# Patient Record
Sex: MALE | Race: ASIAN | NOT HISPANIC OR LATINO | URBAN - METROPOLITAN AREA
[De-identification: names, ages, dates, MRNs, and addresses within clinical notes are randomized per-mention and may not be internally consistent; named-entity substitution may affect disease eponyms.]

---

## 2018-01-21 ENCOUNTER — EMERGENCY (EMERGENCY)
Facility: HOSPITAL | Age: 83
LOS: 1 days | Discharge: HOME | End: 2018-01-21
Attending: EMERGENCY MEDICINE | Admitting: EMERGENCY MEDICINE
Payer: MEDICARE

## 2018-01-21 VITALS
RESPIRATION RATE: 16 BRPM | HEART RATE: 60 BPM | OXYGEN SATURATION: 100 % | SYSTOLIC BLOOD PRESSURE: 101 MMHG | TEMPERATURE: 98 F | DIASTOLIC BLOOD PRESSURE: 88 MMHG

## 2018-01-21 VITALS
HEART RATE: 52 BPM | OXYGEN SATURATION: 100 % | SYSTOLIC BLOOD PRESSURE: 107 MMHG | RESPIRATION RATE: 18 BRPM | DIASTOLIC BLOOD PRESSURE: 51 MMHG

## 2018-01-21 LAB
ALBUMIN SERPL ELPH-MCNC: 3.6 G/DL — SIGNIFICANT CHANGE UP (ref 3.3–5)
ALP SERPL-CCNC: 88 U/L — SIGNIFICANT CHANGE UP (ref 40–120)
ALT FLD-CCNC: 25 U/L — SIGNIFICANT CHANGE UP (ref 4–41)
APPEARANCE UR: SIGNIFICANT CHANGE UP
AST SERPL-CCNC: 39 U/L — SIGNIFICANT CHANGE UP (ref 4–40)
BASE EXCESS BLDV CALC-SCNC: -3.6 MMOL/L — SIGNIFICANT CHANGE UP
BASE EXCESS BLDV CALC-SCNC: 0.4 MMOL/L — SIGNIFICANT CHANGE UP
BASOPHILS # BLD AUTO: 0.05 K/UL — SIGNIFICANT CHANGE UP (ref 0–0.2)
BASOPHILS NFR BLD AUTO: 0.7 % — SIGNIFICANT CHANGE UP (ref 0–2)
BILIRUB SERPL-MCNC: 0.7 MG/DL — SIGNIFICANT CHANGE UP (ref 0.2–1.2)
BILIRUB UR-MCNC: NEGATIVE — SIGNIFICANT CHANGE UP
BLOOD GAS VENOUS - CREATININE: 1.57 MG/DL — HIGH (ref 0.5–1.3)
BLOOD GAS VENOUS - CREATININE: 1.88 MG/DL — HIGH (ref 0.5–1.3)
BLOOD UR QL VISUAL: NEGATIVE — SIGNIFICANT CHANGE UP
BUN SERPL-MCNC: 23 MG/DL — SIGNIFICANT CHANGE UP (ref 7–23)
CALCIUM SERPL-MCNC: 9.1 MG/DL — SIGNIFICANT CHANGE UP (ref 8.4–10.5)
CHLORIDE BLDV-SCNC: 108 MMOL/L — SIGNIFICANT CHANGE UP (ref 96–108)
CHLORIDE BLDV-SCNC: 110 MMOL/L — HIGH (ref 96–108)
CHLORIDE SERPL-SCNC: 105 MMOL/L — SIGNIFICANT CHANGE UP (ref 98–107)
CO2 SERPL-SCNC: 22 MMOL/L — SIGNIFICANT CHANGE UP (ref 22–31)
COLOR SPEC: YELLOW — SIGNIFICANT CHANGE UP
CREAT SERPL-MCNC: 1.92 MG/DL — HIGH (ref 0.5–1.3)
EOSINOPHIL # BLD AUTO: 0.18 K/UL — SIGNIFICANT CHANGE UP (ref 0–0.5)
EOSINOPHIL NFR BLD AUTO: 2.3 % — SIGNIFICANT CHANGE UP (ref 0–6)
GAS PNL BLDV: 134 MMOL/L — LOW (ref 136–146)
GAS PNL BLDV: 141 MMOL/L — SIGNIFICANT CHANGE UP (ref 136–146)
GLUCOSE BLDV-MCNC: 184 — HIGH (ref 70–99)
GLUCOSE BLDV-MCNC: 95 — SIGNIFICANT CHANGE UP (ref 70–99)
GLUCOSE SERPL-MCNC: 187 MG/DL — HIGH (ref 70–99)
GLUCOSE UR-MCNC: NEGATIVE — SIGNIFICANT CHANGE UP
HCO3 BLDV-SCNC: 20 MMOL/L — SIGNIFICANT CHANGE UP (ref 20–27)
HCO3 BLDV-SCNC: 23 MMOL/L — SIGNIFICANT CHANGE UP (ref 20–27)
HCT VFR BLD CALC: 43.8 % — SIGNIFICANT CHANGE UP (ref 39–50)
HCT VFR BLDV CALC: 42.3 % — SIGNIFICANT CHANGE UP (ref 39–51)
HCT VFR BLDV CALC: 45 % — SIGNIFICANT CHANGE UP (ref 39–51)
HGB BLD-MCNC: 14.4 G/DL — SIGNIFICANT CHANGE UP (ref 13–17)
HGB BLDV-MCNC: 13.8 G/DL — SIGNIFICANT CHANGE UP (ref 13–17)
HGB BLDV-MCNC: 14.7 G/DL — SIGNIFICANT CHANGE UP (ref 13–17)
HYALINE CASTS # UR AUTO: SIGNIFICANT CHANGE UP (ref 0–?)
IMM GRANULOCYTES # BLD AUTO: 0.02 # — SIGNIFICANT CHANGE UP
IMM GRANULOCYTES NFR BLD AUTO: 0.3 % — SIGNIFICANT CHANGE UP (ref 0–1.5)
KETONES UR-MCNC: NEGATIVE — SIGNIFICANT CHANGE UP
LACTATE BLDV-MCNC: 1.9 MMOL/L — SIGNIFICANT CHANGE UP (ref 0.5–2)
LACTATE BLDV-MCNC: 2.3 MMOL/L — HIGH (ref 0.5–2)
LEUKOCYTE ESTERASE UR-ACNC: NEGATIVE — SIGNIFICANT CHANGE UP
LYMPHOCYTES # BLD AUTO: 3.97 K/UL — HIGH (ref 1–3.3)
LYMPHOCYTES # BLD AUTO: 51.6 % — HIGH (ref 13–44)
MAGNESIUM SERPL-MCNC: 2.1 MG/DL — SIGNIFICANT CHANGE UP (ref 1.6–2.6)
MCHC RBC-ENTMCNC: 28.6 PG — SIGNIFICANT CHANGE UP (ref 27–34)
MCHC RBC-ENTMCNC: 32.9 % — SIGNIFICANT CHANGE UP (ref 32–36)
MCV RBC AUTO: 87.1 FL — SIGNIFICANT CHANGE UP (ref 80–100)
MONOCYTES # BLD AUTO: 0.73 K/UL — SIGNIFICANT CHANGE UP (ref 0–0.9)
MONOCYTES NFR BLD AUTO: 9.5 % — SIGNIFICANT CHANGE UP (ref 2–14)
MUCOUS THREADS # UR AUTO: SIGNIFICANT CHANGE UP
NEUTROPHILS # BLD AUTO: 2.74 K/UL — SIGNIFICANT CHANGE UP (ref 1.8–7.4)
NEUTROPHILS NFR BLD AUTO: 35.6 % — LOW (ref 43–77)
NITRITE UR-MCNC: NEGATIVE — SIGNIFICANT CHANGE UP
NRBC # FLD: 0 — SIGNIFICANT CHANGE UP
PCO2 BLDV: 43 MMHG — SIGNIFICANT CHANGE UP (ref 41–51)
PCO2 BLDV: 47 MMHG — SIGNIFICANT CHANGE UP (ref 41–51)
PH BLDV: 7.32 PH — SIGNIFICANT CHANGE UP (ref 7.32–7.43)
PH BLDV: 7.35 PH — SIGNIFICANT CHANGE UP (ref 7.32–7.43)
PH UR: 6 — SIGNIFICANT CHANGE UP (ref 4.6–8)
PHOSPHATE SERPL-MCNC: 3.6 MG/DL — SIGNIFICANT CHANGE UP (ref 2.5–4.5)
PLATELET # BLD AUTO: 176 K/UL — SIGNIFICANT CHANGE UP (ref 150–400)
PMV BLD: 8.7 FL — SIGNIFICANT CHANGE UP (ref 7–13)
PO2 BLDV: 25 MMHG — LOW (ref 35–40)
PO2 BLDV: 33 MMHG — LOW (ref 35–40)
POTASSIUM BLDV-SCNC: 4.1 MMOL/L — SIGNIFICANT CHANGE UP (ref 3.4–4.5)
POTASSIUM BLDV-SCNC: 4.4 MMOL/L — SIGNIFICANT CHANGE UP (ref 3.4–4.5)
POTASSIUM SERPL-MCNC: 4.6 MMOL/L — SIGNIFICANT CHANGE UP (ref 3.5–5.3)
POTASSIUM SERPL-SCNC: 4.6 MMOL/L — SIGNIFICANT CHANGE UP (ref 3.5–5.3)
PROT SERPL-MCNC: 8 G/DL — SIGNIFICANT CHANGE UP (ref 6–8.3)
PROT UR-MCNC: 20 MG/DL — SIGNIFICANT CHANGE UP
RBC # BLD: 5.03 M/UL — SIGNIFICANT CHANGE UP (ref 4.2–5.8)
RBC # FLD: 14.1 % — SIGNIFICANT CHANGE UP (ref 10.3–14.5)
RBC CASTS # UR COMP ASSIST: SIGNIFICANT CHANGE UP (ref 0–?)
SAO2 % BLDV: 41.4 % — LOW (ref 60–85)
SAO2 % BLDV: 56.4 % — LOW (ref 60–85)
SODIUM SERPL-SCNC: 139 MMOL/L — SIGNIFICANT CHANGE UP (ref 135–145)
SP GR SPEC: 1.02 — SIGNIFICANT CHANGE UP (ref 1–1.04)
SQUAMOUS # UR AUTO: SIGNIFICANT CHANGE UP
UROBILINOGEN FLD QL: 1 MG/DL — SIGNIFICANT CHANGE UP
WBC # BLD: 7.69 K/UL — SIGNIFICANT CHANGE UP (ref 3.8–10.5)
WBC # FLD AUTO: 7.69 K/UL — SIGNIFICANT CHANGE UP (ref 3.8–10.5)
WBC UR QL: SIGNIFICANT CHANGE UP (ref 0–?)

## 2018-01-21 PROCEDURE — 99284 EMERGENCY DEPT VISIT MOD MDM: CPT | Mod: GC

## 2018-01-21 PROCEDURE — 71046 X-RAY EXAM CHEST 2 VIEWS: CPT | Mod: 26

## 2018-01-21 PROCEDURE — 74176 CT ABD & PELVIS W/O CONTRAST: CPT | Mod: 26

## 2018-01-21 RX ORDER — POLYETHYLENE GLYCOL 3350 17 G/17G
17 POWDER, FOR SOLUTION ORAL
Qty: 300 | Refills: 0 | OUTPATIENT
Start: 2018-01-21 | End: 2018-01-27

## 2018-01-21 RX ORDER — SODIUM CHLORIDE 9 MG/ML
1000 INJECTION INTRAMUSCULAR; INTRAVENOUS; SUBCUTANEOUS ONCE
Qty: 0 | Refills: 0 | Status: COMPLETED | OUTPATIENT
Start: 2018-01-21 | End: 2018-01-21

## 2018-01-21 RX ADMIN — SODIUM CHLORIDE 1000 MILLILITER(S): 9 INJECTION INTRAMUSCULAR; INTRAVENOUS; SUBCUTANEOUS at 14:32

## 2018-01-21 NOTE — ED ADULT NURSE REASSESSMENT NOTE - NS ED NURSE REASSESS COMMENT FT1
received pt from BLANCA Potter, c/o urinary retention. Rpt VBG sent as ordered. VSS. EKG done at bedside. Will continue to monitor.

## 2018-01-21 NOTE — ED ADULT NURSE NOTE - OBJECTIVE STATEMENT
Received patient to room 12 with c/o no urinary output for the past couple of days. Pt evaluated by MD. IVL placed to right Ac 20 gauge and labs drawn and sent. Urine and UC&S obtained from straight cath.  Approximately 125cc output. Pt has family at bedside. Awaiting results, further evaluation, ordered and disposition.. BLANCA Day

## 2018-01-21 NOTE — ED PROVIDER NOTE - PROGRESS NOTE DETAILS
Nithya Ku MD PGY4  patient reassessed and reporting improvement of symptoms. copy of results given and instructed to follow up with pmd/urology

## 2018-01-21 NOTE — ED PROVIDER NOTE - OBJECTIVE STATEMENT
87 yo M with alzheimers dementia presenting with urinary retention x 2-3 days. Went to [facility] and sent to ED> Pt also has history of DM, htn, CABG, CKD not on dialysis. Denies fevers, chills, cough, rhinorrhea, otorrhea, otalgia, nausea, vomiting, constipation, diarrhea, chest pain, shortness of breath. Nephrologist Dr Rodas

## 2018-01-21 NOTE — ED PROVIDER NOTE - PMH
DM (diabetes mellitus)    Gastritis  + h pylori  GERD (gastroesophageal reflux disease)    Hep C w/o coma, chronic    History of subdural hematoma (post traumatic)    HTN (hypertension)    Tubular adenoma of colon

## 2018-01-21 NOTE — ED PROVIDER NOTE - MEDICAL DECISION MAKING DETAILS
87 yo M with urinary retention - bedside US shows <150cc urine - will straight cath to check urine, labs, assess renal function

## 2018-01-21 NOTE — ED PROVIDER NOTE - ATTENDING CONTRIBUTION TO CARE
I performed a face-to-face evaluation of the patient and performed a history and physical examination. I agree with the history and physical examination.    Older man, dementia, CKD (not on dialysis), p/w no urine x 2 days. Eating and drinking well. Feels need to urinate, but nothing comes. Appears well. NAD. Afebrile. Vital signs unremarkable. EDBUS no urine retention. Concern for renal failure. Plan: labs.

## 2018-01-22 LAB
BACTERIA UR CULT: SIGNIFICANT CHANGE UP
SPECIMEN SOURCE: SIGNIFICANT CHANGE UP

## 2018-04-09 PROCEDURE — 85060 BLOOD SMEAR INTERPRETATION: CPT

## 2018-05-30 ENCOUNTER — INPATIENT (INPATIENT)
Facility: HOSPITAL | Age: 83
LOS: 13 days | Discharge: HOME HEALTH | End: 2018-06-13
Attending: INTERNAL MEDICINE | Admitting: INTERNAL MEDICINE
Payer: MEDICARE

## 2018-05-30 VITALS
RESPIRATION RATE: 16 BRPM | HEART RATE: 62 BPM | DIASTOLIC BLOOD PRESSURE: 68 MMHG | TEMPERATURE: 98 F | SYSTOLIC BLOOD PRESSURE: 111 MMHG | OXYGEN SATURATION: 99 %

## 2018-05-30 DIAGNOSIS — G30.9 ALZHEIMER'S DISEASE, UNSPECIFIED: ICD-10-CM

## 2018-05-30 DIAGNOSIS — Z95.1 PRESENCE OF AORTOCORONARY BYPASS GRAFT: Chronic | ICD-10-CM

## 2018-05-30 DIAGNOSIS — N17.9 ACUTE KIDNEY FAILURE, UNSPECIFIED: ICD-10-CM

## 2018-05-30 DIAGNOSIS — I10 ESSENTIAL (PRIMARY) HYPERTENSION: ICD-10-CM

## 2018-05-30 DIAGNOSIS — E11.22 TYPE 2 DIABETES MELLITUS WITH DIABETIC CHRONIC KIDNEY DISEASE: ICD-10-CM

## 2018-05-30 DIAGNOSIS — R26.0 ATAXIC GAIT: ICD-10-CM

## 2018-05-30 DIAGNOSIS — R26.81 UNSTEADINESS ON FEET: ICD-10-CM

## 2018-05-30 DIAGNOSIS — I25.10 ATHEROSCLEROTIC HEART DISEASE OF NATIVE CORONARY ARTERY WITHOUT ANGINA PECTORIS: ICD-10-CM

## 2018-05-30 LAB
ALBUMIN SERPL ELPH-MCNC: 3.5 G/DL — SIGNIFICANT CHANGE UP (ref 3.3–5)
ALP SERPL-CCNC: 110 U/L — SIGNIFICANT CHANGE UP (ref 40–120)
ALT FLD-CCNC: 20 U/L — SIGNIFICANT CHANGE UP (ref 4–41)
AST SERPL-CCNC: 48 U/L — HIGH (ref 4–40)
BASOPHILS # BLD AUTO: 0.05 K/UL — SIGNIFICANT CHANGE UP (ref 0–0.2)
BASOPHILS NFR BLD AUTO: 0.5 % — SIGNIFICANT CHANGE UP (ref 0–2)
BILIRUB SERPL-MCNC: 0.8 MG/DL — SIGNIFICANT CHANGE UP (ref 0.2–1.2)
BUN SERPL-MCNC: 28 MG/DL — HIGH (ref 7–23)
CALCIUM SERPL-MCNC: 10.3 MG/DL — SIGNIFICANT CHANGE UP (ref 8.4–10.5)
CHLORIDE SERPL-SCNC: 99 MMOL/L — SIGNIFICANT CHANGE UP (ref 98–107)
CO2 SERPL-SCNC: 24 MMOL/L — SIGNIFICANT CHANGE UP (ref 22–31)
CREAT SERPL-MCNC: 2.29 MG/DL — HIGH (ref 0.5–1.3)
EOSINOPHIL # BLD AUTO: 0.3 K/UL — SIGNIFICANT CHANGE UP (ref 0–0.5)
EOSINOPHIL NFR BLD AUTO: 3.2 % — SIGNIFICANT CHANGE UP (ref 0–6)
GLUCOSE SERPL-MCNC: 127 MG/DL — HIGH (ref 70–99)
HCT VFR BLD CALC: 47.5 % — SIGNIFICANT CHANGE UP (ref 39–50)
HGB BLD-MCNC: 15.1 G/DL — SIGNIFICANT CHANGE UP (ref 13–17)
IMM GRANULOCYTES # BLD AUTO: 0.03 # — SIGNIFICANT CHANGE UP
IMM GRANULOCYTES NFR BLD AUTO: 0.3 % — SIGNIFICANT CHANGE UP (ref 0–1.5)
LYMPHOCYTES # BLD AUTO: 5.59 K/UL — HIGH (ref 1–3.3)
LYMPHOCYTES # BLD AUTO: 59.9 % — HIGH (ref 13–44)
MCHC RBC-ENTMCNC: 27.9 PG — SIGNIFICANT CHANGE UP (ref 27–34)
MCHC RBC-ENTMCNC: 31.8 % — LOW (ref 32–36)
MCV RBC AUTO: 87.6 FL — SIGNIFICANT CHANGE UP (ref 80–100)
MONOCYTES # BLD AUTO: 1 K/UL — HIGH (ref 0–0.9)
MONOCYTES NFR BLD AUTO: 10.7 % — SIGNIFICANT CHANGE UP (ref 2–14)
NEUTROPHILS # BLD AUTO: 2.36 K/UL — SIGNIFICANT CHANGE UP (ref 1.8–7.4)
NEUTROPHILS NFR BLD AUTO: 25.4 % — LOW (ref 43–77)
NRBC # FLD: 0.04 — SIGNIFICANT CHANGE UP
PLATELET # BLD AUTO: 127 K/UL — LOW (ref 150–400)
PMV BLD: 8.6 FL — SIGNIFICANT CHANGE UP (ref 7–13)
POTASSIUM SERPL-MCNC: 5.2 MMOL/L — SIGNIFICANT CHANGE UP (ref 3.5–5.3)
POTASSIUM SERPL-SCNC: 5.2 MMOL/L — SIGNIFICANT CHANGE UP (ref 3.5–5.3)
PROT SERPL-MCNC: 8.8 G/DL — HIGH (ref 6–8.3)
RBC # BLD: 5.42 M/UL — SIGNIFICANT CHANGE UP (ref 4.2–5.8)
RBC # FLD: 15.5 % — HIGH (ref 10.3–14.5)
SODIUM SERPL-SCNC: 136 MMOL/L — SIGNIFICANT CHANGE UP (ref 135–145)
WBC # BLD: 9.33 K/UL — SIGNIFICANT CHANGE UP (ref 3.8–10.5)
WBC # FLD AUTO: 9.33 K/UL — SIGNIFICANT CHANGE UP (ref 3.8–10.5)

## 2018-05-30 PROCEDURE — 70450 CT HEAD/BRAIN W/O DYE: CPT | Mod: 26

## 2018-05-30 PROCEDURE — 72110 X-RAY EXAM L-2 SPINE 4/>VWS: CPT | Mod: 26

## 2018-05-30 PROCEDURE — 72070 X-RAY EXAM THORAC SPINE 2VWS: CPT | Mod: 26

## 2018-05-30 RX ORDER — ASPIRIN/CALCIUM CARB/MAGNESIUM 324 MG
81 TABLET ORAL DAILY
Qty: 0 | Refills: 0 | Status: DISCONTINUED | OUTPATIENT
Start: 2018-05-30 | End: 2018-06-04

## 2018-05-30 RX ORDER — SODIUM CHLORIDE 9 MG/ML
500 INJECTION INTRAMUSCULAR; INTRAVENOUS; SUBCUTANEOUS ONCE
Qty: 0 | Refills: 0 | Status: COMPLETED | OUTPATIENT
Start: 2018-05-30 | End: 2018-05-30

## 2018-05-30 RX ORDER — METOPROLOL TARTRATE 50 MG
100 TABLET ORAL DAILY
Qty: 0 | Refills: 0 | Status: DISCONTINUED | OUTPATIENT
Start: 2018-05-30 | End: 2018-06-12

## 2018-05-30 RX ORDER — ACETAMINOPHEN 500 MG
650 TABLET ORAL EVERY 6 HOURS
Qty: 0 | Refills: 0 | Status: DISCONTINUED | OUTPATIENT
Start: 2018-05-30 | End: 2018-06-13

## 2018-05-30 RX ORDER — FOLIC ACID 0.8 MG
1 TABLET ORAL DAILY
Qty: 0 | Refills: 0 | Status: DISCONTINUED | OUTPATIENT
Start: 2018-05-30 | End: 2018-06-13

## 2018-05-30 RX ORDER — SODIUM CHLORIDE 9 MG/ML
1000 INJECTION INTRAMUSCULAR; INTRAVENOUS; SUBCUTANEOUS
Qty: 0 | Refills: 0 | Status: DISCONTINUED | OUTPATIENT
Start: 2018-05-30 | End: 2018-06-03

## 2018-05-30 RX ORDER — DONEPEZIL HYDROCHLORIDE 10 MG/1
5 TABLET, FILM COATED ORAL AT BEDTIME
Qty: 0 | Refills: 0 | Status: DISCONTINUED | OUTPATIENT
Start: 2018-05-30 | End: 2018-06-13

## 2018-05-30 RX ORDER — PREGABALIN 225 MG/1
1000 CAPSULE ORAL DAILY
Qty: 0 | Refills: 0 | Status: DISCONTINUED | OUTPATIENT
Start: 2018-05-30 | End: 2018-06-13

## 2018-05-30 RX ORDER — ATORVASTATIN CALCIUM 80 MG/1
80 TABLET, FILM COATED ORAL AT BEDTIME
Qty: 0 | Refills: 0 | Status: DISCONTINUED | OUTPATIENT
Start: 2018-05-30 | End: 2018-06-13

## 2018-05-30 RX ADMIN — SODIUM CHLORIDE 75 MILLILITER(S): 9 INJECTION INTRAMUSCULAR; INTRAVENOUS; SUBCUTANEOUS at 22:41

## 2018-05-30 RX ADMIN — ATORVASTATIN CALCIUM 80 MILLIGRAM(S): 80 TABLET, FILM COATED ORAL at 22:02

## 2018-05-30 RX ADMIN — DONEPEZIL HYDROCHLORIDE 5 MILLIGRAM(S): 10 TABLET, FILM COATED ORAL at 22:02

## 2018-05-30 RX ADMIN — SODIUM CHLORIDE 500 MILLILITER(S): 9 INJECTION INTRAMUSCULAR; INTRAVENOUS; SUBCUTANEOUS at 20:33

## 2018-05-30 NOTE — H&P ADULT - PROBLEM SELECTOR PLAN 5
- Continue donepezil  - Fall precautions - continue ASA and atorvastatin  - No ischemic changes on EKG

## 2018-05-30 NOTE — H&P ADULT - HISTORY OF PRESENT ILLNESS
87 yo M h/o HTN, DM, CKDIII, CAD s/p CABG, SDH, dementia brought by family for back pain and unsteady gait. Unable to obtain history from pt due to dementia. Per son at bedside, pt fell in the bathroom three weeks ago. Fall unwitnessed but heard by family who responded immediately and found pt on the floor. There was no LOC, unclear if any head trauma.  Since that fall pt has been complaining of back pain and has had rapid decline in ability to ambulate. Prior to fall pt ambulated independently using cane but now needs assistance from family. Also unable to tolerate sitting and prefers to be laying in bed. Family has also noted that he appears weak, appetite has been poor and he has to be forced to eat. He has had about 30lb weight loss in the past 3 months. Family has not noted significant cognitive decline. At baseline pt usually AAOx1 sometimes recognizes place and recognizes family members, able to hold a conversation and make his needs known. However, he needs assistance with all ADLs, including eating and bathing.     In ED pt given 500cc bolus  VS:  111/68  62  97.7  16  99% on RA

## 2018-05-30 NOTE — H&P ADULT - PROBLEM SELECTOR PLAN 2
- Monitor BP  - Continue metoprolol with hold parameters - Likely prerenal in setting of poor PO intake  - Start IVFs. Monitor Cr

## 2018-05-30 NOTE — H&P ADULT - PROBLEM SELECTOR PLAN 1
- s/p fall 3 weeks ago with persistent back pain. X ray showing likely T7 compression fracture. Per family, rapid decline in ambulation and independence   - Will obtain MRI T and L spine. No contrast given renal failure  - PT consult   - Fall precautions  - Tylenol for back pain prn

## 2018-05-30 NOTE — ED ADULT NURSE NOTE - ED STAT RN HANDOFF DETAILS
Patient is A&Ox2 (baseline mental status), Family at bedside, and in NAD, and has room available.  Report given to nurse on floor via phone.  Patient awaiting transportation.  Will continue to monitor patient closely. MARILYN Cordero R.N.

## 2018-05-30 NOTE — ED ADULT NURSE NOTE - OBJECTIVE STATEMENT
Patient as per family had unwitnessed fall approx 1 month ago while in the bath tub. Patient fell while in bathtub, did not fall from dizziness, but slipped. Patient hit head and did not lose LOC, not on blood thinners. As per family, patient is at baseline mental status. Patient has equal strength on both extremities, has difficulty walking with cane. Patient attempted to ambulate with assistance, assessed unsteady gait. Patient denies any dizziness or near syncope. Patient denies SOB on exertion or chest pain. Patient has no urinary or bowel issues.

## 2018-05-30 NOTE — H&P ADULT - PROBLEM SELECTOR PROBLEM 4
Coronary artery disease involving native heart without angina pectoris, unspecified vessel or lesion type Type 2 diabetes mellitus with stage 3 chronic kidney disease, without long-term current use of insulin

## 2018-05-30 NOTE — ED ADULT NURSE NOTE - CHPI ED SYMPTOMS NEG
no abrasion/no confusion/no deformity/no tingling/no weakness/no fever/no loss of consciousness/no numbness/no vomiting/no bleeding

## 2018-05-30 NOTE — H&P ADULT - PROBLEM SELECTOR PROBLEM 5
Alzheimer's dementia without behavioral disturbance, unspecified timing of dementia onset Coronary artery disease involving native heart without angina pectoris, unspecified vessel or lesion type

## 2018-05-30 NOTE — H&P ADULT - PROBLEM SELECTOR PLAN 3
- Hold oral meds   - Start sliding scale insulin   - Check HbA1C - Monitor BP  - Continue metoprolol with hold parameters

## 2018-05-30 NOTE — ED PROVIDER NOTE - ATTENDING CONTRIBUTION TO CARE
Case of an  87 y/o male patient with past medical history of CABG, dementia, HTN presenting to the ED due to a  fall last month, back pain and difficulty walking x 1 month. In the fall patient hit his head, he had x-rays that showed questionable fractures, but no head CT. Upon exam patient AAOX1, does denies pain, able to walk but with marked difficulty not due to pain, ataxic. R/O head bleed, R/O FXs, will check labs, head CT, x-rays and monitor closely. Agree with PA's physical exam, assessment and documentation

## 2018-05-30 NOTE — H&P ADULT - PROBLEM SELECTOR PROBLEM 3
Type 2 diabetes mellitus with stage 3 chronic kidney disease, without long-term current use of insulin Essential hypertension

## 2018-05-30 NOTE — H&P ADULT - ASSESSMENT
87 yo M h/o HTN, DM, CKDIII, CAD s/p CABG, SDH, dementia brought by family for back pain and unsteady gait after fall at home.

## 2018-05-30 NOTE — H&P ADULT - NSHPPHYSICALEXAM_GEN_ALL_CORE
T(C): 36.7 (05-30-18 @ 20:33), Max: 36.7 (05-30-18 @ 20:33)  HR: 57 (05-30-18 @ 20:33) (57 - 62)  BP: 122/51 (05-30-18 @ 20:33) (111/68 - 122/51)  RR: 18 (05-30-18 @ 20:33) (16 - 18)  SpO2: 100% (05-30-18 @ 20:33) (99% - 100%)    GENERAL: No acute distress, thin  HEAD:  Atraumatic, temporal wasting  ENT: EOMI, PERRLA, conjunctiva and sclera clear, Neck supple, No JVD, moist mucosa  CHEST/LUNG: Clear to auscultation bilaterally; No wheeze, equal breath sounds bilaterally   HEART: Regular rate and rhythm; No murmurs, rubs, or gallops  ABDOMEN: Soft, Nontender, Nondistended; Bowel sounds present, no organomegaly  EXTREMITIES:  2+ Peripheral Pulses, No clubbing, cyanosis, or edema  PSYCH: AAOx1 to person, flat affect  NEUROLOGY: non-focal, moving all extremities, sensation intact   SKIN: Normal color, No rashes or lesions  MUSCULOSKELETAL: No spinal tenderness

## 2018-05-30 NOTE — ED PROVIDER NOTE - PROGRESS NOTE DETAILS
ETHAN Pena called. He requests admission to Dr. Leyva's service. Gordon called. LIANNA left on answering machine.

## 2018-05-30 NOTE — H&P ADULT - PROBLEM SELECTOR PLAN 4
- continue ASA and atorvastatin  - No ischemic changes on EKG - Hold oral meds   - Start sliding scale insulin   - Check HbA1C

## 2018-05-30 NOTE — ED PROVIDER NOTE - MEDICAL DECISION MAKING DETAILS
Pt is an 87 y/o M nonsmoker PMHx CABG, dementia, HTN p/w fall last month, back pain and difficulty walking x 1 month -- r/o ICH, unsteady gait; no e/o spinal cord compression/cauda equina -- labs, CT head, Xray thoracolumbarsacral

## 2018-05-30 NOTE — ED ADULT TRIAGE NOTE - CHIEF COMPLAINT QUOTE
Pt had mechanical fall x3 weeks ago.  Daughter states pt is now c/o lower back pain, pointing and rubbing lower back.  PMHx Alzheimer's, chronic kidney disease.  Pt appears comfortable.

## 2018-05-30 NOTE — H&P ADULT - NSHPLABSRESULTS_GEN_ALL_CORE
.  LABS:                         15.1   9.33  )-----------( 127      ( 30 May 2018 19:04 )             47.5     05-30    136  |  99  |  28<H>  ----------------------------<  127<H>  5.2   |  24  |  2.29<H>    Ca    10.3      30 May 2018 19:04    TPro  8.8<H>  /  Alb  3.5  /  TBili  0.8  /  DBili  x   /  AST  48<H>  /  ALT  20  /  AlkPhos  110  05-30          RADIOLOGY, EKG & ADDITIONAL TESTS: Reviewed.

## 2018-05-30 NOTE — ED PROVIDER NOTE - OBJECTIVE STATEMENT
Pt is an 89 y/o M nonsmoker PMHx CABG, dementia, HTN p/w fall last month, back pain and difficulty walking x 1 month.  History largely obtained from family due to pt's known h/o dementia.  Pt had an unwitnessed fall 4/23/18 in shower.  Pt's family immediately rushed to bathroom and pt found awake on floor of bathtub.  At that time, pt was denying LOC.  Pt has since complained of lower back pain intermittently, which worsens with movement, standing, walking.  Pt saw PMD Dr. Alfaro a few days after fall who ordered Xrays, showing possible fracture of T10 and L4 for which MRI was ordered.  MRI not yet performed due to "insurance issues."  Pt denies any fevers, chills, numbness, weakness, headache, neck pain, fecal/urinary incontinence.  Pt's family states since fall he has not been able to walk due to pain and "weak" gait.  Pt was previously ambulatory with cane.  Pt now needs assistance with a person helping.  At this time, pt denies any pain.

## 2018-05-31 DIAGNOSIS — N18.3 CHRONIC KIDNEY DISEASE, STAGE 3 (MODERATE): ICD-10-CM

## 2018-05-31 DIAGNOSIS — N17.9 ACUTE KIDNEY FAILURE, UNSPECIFIED: ICD-10-CM

## 2018-05-31 LAB
ALBUMIN SERPL ELPH-MCNC: 2.8 G/DL — LOW (ref 3.3–5)
ALP SERPL-CCNC: 100 U/L — SIGNIFICANT CHANGE UP (ref 40–120)
ALT FLD-CCNC: 17 U/L — SIGNIFICANT CHANGE UP (ref 4–41)
ANISOCYTOSIS BLD QL: SLIGHT — SIGNIFICANT CHANGE UP
APPEARANCE UR: CLEAR — SIGNIFICANT CHANGE UP
AST SERPL-CCNC: 29 U/L — SIGNIFICANT CHANGE UP (ref 4–40)
BASOPHILS # BLD AUTO: 0.04 K/UL — SIGNIFICANT CHANGE UP (ref 0–0.2)
BASOPHILS NFR BLD AUTO: 0.5 % — SIGNIFICANT CHANGE UP (ref 0–2)
BASOPHILS NFR SPEC: 1 % — SIGNIFICANT CHANGE UP (ref 0–2)
BILIRUB SERPL-MCNC: 0.8 MG/DL — SIGNIFICANT CHANGE UP (ref 0.2–1.2)
BILIRUB UR-MCNC: NEGATIVE — SIGNIFICANT CHANGE UP
BLOOD UR QL VISUAL: NEGATIVE — SIGNIFICANT CHANGE UP
BUN SERPL-MCNC: 24 MG/DL — HIGH (ref 7–23)
CALCIUM SERPL-MCNC: 9.3 MG/DL — SIGNIFICANT CHANGE UP (ref 8.4–10.5)
CHLORIDE SERPL-SCNC: 104 MMOL/L — SIGNIFICANT CHANGE UP (ref 98–107)
CO2 SERPL-SCNC: 22 MMOL/L — SIGNIFICANT CHANGE UP (ref 22–31)
COLOR SPEC: SIGNIFICANT CHANGE UP
CREAT ?TM UR-MCNC: 58.09 MG/DL — SIGNIFICANT CHANGE UP
CREAT SERPL-MCNC: 1.8 MG/DL — HIGH (ref 0.5–1.3)
EOSINOPHIL # BLD AUTO: 0.29 K/UL — SIGNIFICANT CHANGE UP (ref 0–0.5)
EOSINOPHIL NFR BLD AUTO: 3.8 % — SIGNIFICANT CHANGE UP (ref 0–6)
EOSINOPHIL NFR FLD: 2.8 % — SIGNIFICANT CHANGE UP (ref 0–6)
GIANT PLATELETS BLD QL SMEAR: PRESENT — SIGNIFICANT CHANGE UP
GLUCOSE BLDC GLUCOMTR-MCNC: 119 MG/DL — HIGH (ref 70–99)
GLUCOSE BLDC GLUCOMTR-MCNC: 153 MG/DL — HIGH (ref 70–99)
GLUCOSE BLDC GLUCOMTR-MCNC: 199 MG/DL — HIGH (ref 70–99)
GLUCOSE SERPL-MCNC: 142 MG/DL — HIGH (ref 70–99)
GLUCOSE UR-MCNC: 50 — SIGNIFICANT CHANGE UP
HCT VFR BLD CALC: 42.5 % — SIGNIFICANT CHANGE UP (ref 39–50)
HGB BLD-MCNC: 13.8 G/DL — SIGNIFICANT CHANGE UP (ref 13–17)
IMM GRANULOCYTES # BLD AUTO: 0.02 # — SIGNIFICANT CHANGE UP
IMM GRANULOCYTES NFR BLD AUTO: 0.3 % — SIGNIFICANT CHANGE UP (ref 0–1.5)
KETONES UR-MCNC: NEGATIVE — SIGNIFICANT CHANGE UP
LEUKOCYTE ESTERASE UR-ACNC: NEGATIVE — SIGNIFICANT CHANGE UP
LYMPHOCYTES # BLD AUTO: 3.54 K/UL — HIGH (ref 1–3.3)
LYMPHOCYTES # BLD AUTO: 46.9 % — HIGH (ref 13–44)
LYMPHOCYTES NFR SPEC AUTO: 49.1 % — HIGH (ref 13–44)
MACROCYTES BLD QL: SLIGHT — SIGNIFICANT CHANGE UP
MAGNESIUM SERPL-MCNC: 1.9 MG/DL — SIGNIFICANT CHANGE UP (ref 1.6–2.6)
MCHC RBC-ENTMCNC: 28.3 PG — SIGNIFICANT CHANGE UP (ref 27–34)
MCHC RBC-ENTMCNC: 32.5 % — SIGNIFICANT CHANGE UP (ref 32–36)
MCV RBC AUTO: 87.1 FL — SIGNIFICANT CHANGE UP (ref 80–100)
MICROCYTES BLD QL: SLIGHT — SIGNIFICANT CHANGE UP
MONOCYTES # BLD AUTO: 0.75 K/UL — SIGNIFICANT CHANGE UP (ref 0–0.9)
MONOCYTES NFR BLD AUTO: 9.9 % — SIGNIFICANT CHANGE UP (ref 2–14)
MONOCYTES NFR BLD: 11.3 % — HIGH (ref 2–9)
MUCOUS THREADS # UR AUTO: SIGNIFICANT CHANGE UP
NEUTROPHIL AB SER-ACNC: 22.6 % — LOW (ref 43–77)
NEUTROPHILS # BLD AUTO: 2.91 K/UL — SIGNIFICANT CHANGE UP (ref 1.8–7.4)
NEUTROPHILS NFR BLD AUTO: 38.6 % — LOW (ref 43–77)
NITRITE UR-MCNC: NEGATIVE — SIGNIFICANT CHANGE UP
NON-SQ EPI CELLS # UR AUTO: <1 — SIGNIFICANT CHANGE UP
NRBC # FLD: 0.03 — SIGNIFICANT CHANGE UP
PH UR: 5.5 — SIGNIFICANT CHANGE UP (ref 4.6–8)
PHOSPHATE SERPL-MCNC: 3.6 MG/DL — SIGNIFICANT CHANGE UP (ref 2.5–4.5)
PLATELET # BLD AUTO: 104 K/UL — LOW (ref 150–400)
PLATELET COUNT - ESTIMATE: SIGNIFICANT CHANGE UP
PMV BLD: 9.3 FL — SIGNIFICANT CHANGE UP (ref 7–13)
POTASSIUM SERPL-MCNC: 3.7 MMOL/L — SIGNIFICANT CHANGE UP (ref 3.5–5.3)
POTASSIUM SERPL-SCNC: 3.7 MMOL/L — SIGNIFICANT CHANGE UP (ref 3.5–5.3)
PROT SERPL-MCNC: 7.5 G/DL — SIGNIFICANT CHANGE UP (ref 6–8.3)
PROT UR-MCNC: 8.6 MG/DL — SIGNIFICANT CHANGE UP
PROT UR-MCNC: NEGATIVE MG/DL — SIGNIFICANT CHANGE UP
RBC # BLD: 4.88 M/UL — SIGNIFICANT CHANGE UP (ref 4.2–5.8)
RBC # FLD: 15.1 % — HIGH (ref 10.3–14.5)
RBC CASTS # UR COMP ASSIST: SIGNIFICANT CHANGE UP (ref 0–?)
SMUDGE CELLS # BLD: PRESENT — SIGNIFICANT CHANGE UP
SODIUM SERPL-SCNC: 136 MMOL/L — SIGNIFICANT CHANGE UP (ref 135–145)
SP GR SPEC: 1.01 — SIGNIFICANT CHANGE UP (ref 1–1.04)
SQUAMOUS # UR AUTO: SIGNIFICANT CHANGE UP
UROBILINOGEN FLD QL: NORMAL MG/DL — SIGNIFICANT CHANGE UP
VARIANT LYMPHS # BLD: 13.2 % — SIGNIFICANT CHANGE UP
WBC # BLD: 7.55 K/UL — SIGNIFICANT CHANGE UP (ref 3.8–10.5)
WBC # FLD AUTO: 7.55 K/UL — SIGNIFICANT CHANGE UP (ref 3.8–10.5)
WBC UR QL: SIGNIFICANT CHANGE UP (ref 0–?)

## 2018-05-31 PROCEDURE — 99223 1ST HOSP IP/OBS HIGH 75: CPT

## 2018-05-31 PROCEDURE — 72148 MRI LUMBAR SPINE W/O DYE: CPT | Mod: 26

## 2018-05-31 PROCEDURE — 72146 MRI CHEST SPINE W/O DYE: CPT | Mod: 26

## 2018-05-31 RX ORDER — GLUCAGON INJECTION, SOLUTION 0.5 MG/.1ML
1 INJECTION, SOLUTION SUBCUTANEOUS ONCE
Qty: 0 | Refills: 0 | Status: DISCONTINUED | OUTPATIENT
Start: 2018-05-31 | End: 2018-06-13

## 2018-05-31 RX ORDER — DEXTROSE 50 % IN WATER 50 %
25 SYRINGE (ML) INTRAVENOUS ONCE
Qty: 0 | Refills: 0 | Status: DISCONTINUED | OUTPATIENT
Start: 2018-05-31 | End: 2018-06-13

## 2018-05-31 RX ORDER — INSULIN LISPRO 100/ML
VIAL (ML) SUBCUTANEOUS
Qty: 0 | Refills: 0 | Status: DISCONTINUED | OUTPATIENT
Start: 2018-05-31 | End: 2018-06-13

## 2018-05-31 RX ORDER — DEXTROSE 50 % IN WATER 50 %
15 SYRINGE (ML) INTRAVENOUS ONCE
Qty: 0 | Refills: 0 | Status: DISCONTINUED | OUTPATIENT
Start: 2018-05-31 | End: 2018-06-13

## 2018-05-31 RX ORDER — INSULIN LISPRO 100/ML
VIAL (ML) SUBCUTANEOUS AT BEDTIME
Qty: 0 | Refills: 0 | Status: DISCONTINUED | OUTPATIENT
Start: 2018-05-31 | End: 2018-06-13

## 2018-05-31 RX ORDER — DEXTROSE 50 % IN WATER 50 %
12.5 SYRINGE (ML) INTRAVENOUS ONCE
Qty: 0 | Refills: 0 | Status: DISCONTINUED | OUTPATIENT
Start: 2018-05-31 | End: 2018-06-13

## 2018-05-31 RX ORDER — HALOPERIDOL DECANOATE 100 MG/ML
2 INJECTION INTRAMUSCULAR ONCE
Qty: 0 | Refills: 0 | Status: COMPLETED | OUTPATIENT
Start: 2018-05-31 | End: 2018-05-31

## 2018-05-31 RX ORDER — SODIUM CHLORIDE 9 MG/ML
1000 INJECTION, SOLUTION INTRAVENOUS
Qty: 0 | Refills: 0 | Status: DISCONTINUED | OUTPATIENT
Start: 2018-05-31 | End: 2018-06-13

## 2018-05-31 RX ADMIN — Medication 1 MILLIGRAM(S): at 11:48

## 2018-05-31 RX ADMIN — Medication 1: at 18:00

## 2018-05-31 RX ADMIN — Medication 1: at 11:54

## 2018-05-31 RX ADMIN — PREGABALIN 1000 MICROGRAM(S): 225 CAPSULE ORAL at 11:48

## 2018-05-31 RX ADMIN — Medication 81 MILLIGRAM(S): at 11:48

## 2018-05-31 RX ADMIN — ATORVASTATIN CALCIUM 80 MILLIGRAM(S): 80 TABLET, FILM COATED ORAL at 22:22

## 2018-05-31 RX ADMIN — DONEPEZIL HYDROCHLORIDE 5 MILLIGRAM(S): 10 TABLET, FILM COATED ORAL at 22:22

## 2018-05-31 RX ADMIN — HALOPERIDOL DECANOATE 2 MILLIGRAM(S): 100 INJECTION INTRAMUSCULAR at 00:45

## 2018-05-31 NOTE — PHYSICAL THERAPY INITIAL EVALUATION ADULT - PERTINENT HX OF CURRENT PROBLEM, REHAB EVAL
This is an 87 yo M h/o SDH, dementia brought by family for back pain and unsteady gait after fall at home.

## 2018-05-31 NOTE — CONSULT NOTE ADULT - ASSESSMENT
89 yo M h/o HTN, DM, CKDIII, CAD s/p CABG, SDH, dementia brought by family for back pain and unsteady gait.

## 2018-05-31 NOTE — CONSULT NOTE ADULT - SUBJECTIVE AND OBJECTIVE BOX
Dr. Espinoza (Nephrology)  Office [phone]  Cell [phone]  Lyndsey CALL  Cell [phone]    HPI:  89 yo M h/o HTN, DM, CKDIII, CAD s/p CABG, SDH, dementia brought by family for back pain and unsteady gait. currently patient denied any pain or discomfort. does not know the reason he was in hospital. Per chart, pt fell in the bathroom three weeks ago. Fall unwitnessed but heard by family who responded immediately and found pt on the floor. There was no LOC, unclear if any head trauma.  Since that fall pt has been complaining of back pain and has had rapid decline in ability to ambulate. Prior to fall pt ambulated independently using cane but now needs assistance from family. Also unable to tolerate sitting and prefers to be laying in bed. Family has also noted that he appears weak, appetite has been poor and he has to be forced to eat. He has had about 30lb weight loss in the past 3 months. Family has not noted significant cognitive decline. At baseline pt usually AAOx1 sometimes recognizes place and recognizes family members, able to hold a conversation and make his needs known. However, he needs assistance with all ADLs, including eating and bathing.         Allergies:  No Known Allergies      PAST MEDICAL & SURGICAL HISTORY:  Hep C w/o coma, chronic  Tubular adenoma of colon  Gastritis: + h pylori  DM (diabetes mellitus)  GERD (gastroesophageal reflux disease)  HTN (hypertension)  History of subdural hematoma (post traumatic)  S/P CABG x 3  No Past Surgical History      Home Medications Reviewed    Hospital Medications:   MEDICATIONS  (STANDING):  aspirin enteric coated 81 milliGRAM(s) Oral daily  atorvastatin 80 milliGRAM(s) Oral at bedtime  cyanocobalamin 1000 MICROGram(s) Oral daily  dextrose 5%. 1000 milliLiter(s) (50 mL/Hr) IV Continuous <Continuous>  dextrose 50% Injectable 12.5 Gram(s) IV Push once  dextrose 50% Injectable 25 Gram(s) IV Push once  dextrose 50% Injectable 25 Gram(s) IV Push once  donepezil 5 milliGRAM(s) Oral at bedtime  folic acid 1 milliGRAM(s) Oral daily  insulin lispro (HumaLOG) corrective regimen sliding scale   SubCutaneous three times a day before meals  insulin lispro (HumaLOG) corrective regimen sliding scale   SubCutaneous at bedtime  metoprolol succinate  milliGRAM(s) Oral daily  sodium chloride 0.9%. 1000 milliLiter(s) (75 mL/Hr) IV Continuous <Continuous>      SOCIAL HISTORY:  Denies ETOh, Smoking,     FAMILY HISTORY:  No pertinent family history in first degree relatives      REVIEW OF SYSTEMS:  CONSTITUTIONAL: No weakness, fevers or chills  EYES/ENT: No visual changes;  No vertigo or throat pain   NECK: No pain or stiffness  RESPIRATORY: No cough, wheezing, hemoptysis; No shortness of breath  CARDIOVASCULAR: No chest pain or palpitations.  GASTROINTESTINAL: No abdominal or epigastric pain. No nausea, vomiting, or hematemesis; No diarrhea or constipation. No melena or hematochezia.  GENITOURINARY: No dysuria, frequency, foamy urine, urinary urgency, incontinence or hematuria  NEUROLOGICAL: No numbness or weakness  SKIN: No itching, burning, rashes, or lesions   VASCULAR: No bilateral lower extremity edema.   All other review of systems is negative unless indicated above.    VITALS:  T(F): 97.6 (05-31-18 @ 04:50), Max: 98.1 (05-30-18 @ 20:33)  HR: 53 (05-31-18 @ 05:45)  BP: 111/55 (05-31-18 @ 05:45)  RR: 18 (05-31-18 @ 04:50)  SpO2: 99% (05-31-18 @ 04:50)  Wt(kg): --    Height (cm): 160 (05-30 @ 22:33)  Weight (kg): 53 (05-30 @ 22:33)  BMI (kg/m2): 20.7 (05-30 @ 22:33)  BSA (m2): 1.54 (05-30 @ 22:33)    PHYSICAL EXAM:  Constitutional: NAD  HEENT: anicteric sclera, oropharynx clear, MMM  Neck: No JVD  Respiratory: CTAB, no wheezes, rales or rhonchi  Cardiovascular: S1, S2, RRR  Gastrointestinal: BS+, soft, NT/ND  Extremities: No cyanosis or clubbing. No peripheral edema  Neurological: A/O x 2  Psychiatric: Normal mood, normal affect  : No CVA tenderness. No kauffman.   Skin: No rashes      LABS:  05-31    136  |  104  |  24<H>  ----------------------------<  142<H>  3.7   |  22  |  1.80<H>    Ca    9.3      31 May 2018 05:30  Phos  3.6     05-31  Mg     1.9     05-31    TPro  7.5  /  Alb  2.8<L>  /  TBili  0.8  /  DBili      /  AST  29  /  ALT  17  /  AlkPhos  100  05-31    Creatinine Trend: 1.80 <--, 2.29 <--                        13.8   7.55  )-----------( 104      ( 31 May 2018 05:30 )             42.5     Urine Studies:        RADIOLOGY & ADDITIONAL STUDIES:

## 2018-05-31 NOTE — CONSULT NOTE ADULT - PROBLEM SELECTOR RECOMMENDATION 2
unclear baseline, follows dr. bobby outpatient but has not been following up since 2015. baseline ~1.5 2015, likely now with new baseline.   check pth, phos  has proteinuria vasculitis w/u was done with positive SIF. off ACE/ARB sec to hyperkalemia.   check urine p/c, spep, sif, k/l unclear baseline, follows Dr. Espinoza outpatient but has not been following up since 2015. baseline ~1.5 2015, likely now with new baseline.   check pth, phos  has proteinuria vasculitis w/u was done with positive SIF. off ACE/ARB sec to hyperkalemia.   check urine p/c, spep, sif, k/l

## 2018-05-31 NOTE — PHYSICAL THERAPY INITIAL EVALUATION ADULT - GENERAL OBSERVATIONS, REHAB EVAL
Patient received seated OOB in a chair ,(+) IV fluids , son and daughter -in law at bedside , patient in NAD.

## 2018-06-01 DIAGNOSIS — R62.7 ADULT FAILURE TO THRIVE: ICD-10-CM

## 2018-06-01 DIAGNOSIS — M54.9 DORSALGIA, UNSPECIFIED: ICD-10-CM

## 2018-06-01 LAB
24R-OH-CALCIDIOL SERPL-MCNC: 42.8 NG/ML — SIGNIFICANT CHANGE UP (ref 30–80)
BASOPHILS # BLD AUTO: 0.06 K/UL — SIGNIFICANT CHANGE UP (ref 0–0.2)
BASOPHILS NFR BLD AUTO: 0.6 % — SIGNIFICANT CHANGE UP (ref 0–2)
BUN SERPL-MCNC: 18 MG/DL — SIGNIFICANT CHANGE UP (ref 7–23)
CALCIUM SERPL-MCNC: 10.1 MG/DL — SIGNIFICANT CHANGE UP (ref 8.4–10.5)
CHLORIDE SERPL-SCNC: 105 MMOL/L — SIGNIFICANT CHANGE UP (ref 98–107)
CO2 SERPL-SCNC: 22 MMOL/L — SIGNIFICANT CHANGE UP (ref 22–31)
CREAT SERPL-MCNC: 1.46 MG/DL — HIGH (ref 0.5–1.3)
EOSINOPHIL # BLD AUTO: 0.23 K/UL — SIGNIFICANT CHANGE UP (ref 0–0.5)
EOSINOPHIL NFR BLD AUTO: 2.3 % — SIGNIFICANT CHANGE UP (ref 0–6)
GLUCOSE BLDC GLUCOMTR-MCNC: 104 MG/DL — HIGH (ref 70–99)
GLUCOSE BLDC GLUCOMTR-MCNC: 129 MG/DL — HIGH (ref 70–99)
GLUCOSE BLDC GLUCOMTR-MCNC: 133 MG/DL — HIGH (ref 70–99)
GLUCOSE BLDC GLUCOMTR-MCNC: 217 MG/DL — HIGH (ref 70–99)
GLUCOSE SERPL-MCNC: 131 MG/DL — HIGH (ref 70–99)
HBA1C BLD-MCNC: 7.8 % — HIGH (ref 4–5.6)
HCT VFR BLD CALC: 44.9 % — SIGNIFICANT CHANGE UP (ref 39–50)
HGB BLD-MCNC: 14.6 G/DL — SIGNIFICANT CHANGE UP (ref 13–17)
IMM GRANULOCYTES # BLD AUTO: 0.04 # — SIGNIFICANT CHANGE UP
IMM GRANULOCYTES NFR BLD AUTO: 0.4 % — SIGNIFICANT CHANGE UP (ref 0–1.5)
LYMPHOCYTES # BLD AUTO: 4.73 K/UL — HIGH (ref 1–3.3)
LYMPHOCYTES # BLD AUTO: 47.5 % — HIGH (ref 13–44)
MAGNESIUM SERPL-MCNC: 1.8 MG/DL — SIGNIFICANT CHANGE UP (ref 1.6–2.6)
MCHC RBC-ENTMCNC: 28.2 PG — SIGNIFICANT CHANGE UP (ref 27–34)
MCHC RBC-ENTMCNC: 32.5 % — SIGNIFICANT CHANGE UP (ref 32–36)
MCV RBC AUTO: 86.8 FL — SIGNIFICANT CHANGE UP (ref 80–100)
MONOCYTES # BLD AUTO: 0.78 K/UL — SIGNIFICANT CHANGE UP (ref 0–0.9)
MONOCYTES NFR BLD AUTO: 7.8 % — SIGNIFICANT CHANGE UP (ref 2–14)
NEUTROPHILS # BLD AUTO: 4.11 K/UL — SIGNIFICANT CHANGE UP (ref 1.8–7.4)
NEUTROPHILS NFR BLD AUTO: 41.4 % — LOW (ref 43–77)
NRBC # FLD: 0.03 — SIGNIFICANT CHANGE UP
PHOSPHATE SERPL-MCNC: 3.2 MG/DL — SIGNIFICANT CHANGE UP (ref 2.5–4.5)
PLATELET # BLD AUTO: 123 K/UL — LOW (ref 150–400)
PMV BLD: 9.2 FL — SIGNIFICANT CHANGE UP (ref 7–13)
POTASSIUM SERPL-MCNC: 4.2 MMOL/L — SIGNIFICANT CHANGE UP (ref 3.5–5.3)
POTASSIUM SERPL-SCNC: 4.2 MMOL/L — SIGNIFICANT CHANGE UP (ref 3.5–5.3)
PROT SERPL-MCNC: 8.5 G/DL — HIGH (ref 6–8.3)
PTH-INTACT SERPL-MCNC: 5.92 PG/ML — LOW (ref 15–65)
RBC # BLD: 5.17 M/UL — SIGNIFICANT CHANGE UP (ref 4.2–5.8)
RBC # FLD: 15.3 % — HIGH (ref 10.3–14.5)
SODIUM SERPL-SCNC: 139 MMOL/L — SIGNIFICANT CHANGE UP (ref 135–145)
WBC # BLD: 9.95 K/UL — SIGNIFICANT CHANGE UP (ref 3.8–10.5)
WBC # FLD AUTO: 9.95 K/UL — SIGNIFICANT CHANGE UP (ref 3.8–10.5)

## 2018-06-01 PROCEDURE — 86334 IMMUNOFIX E-PHORESIS SERUM: CPT | Mod: 26

## 2018-06-01 PROCEDURE — 74176 CT ABD & PELVIS W/O CONTRAST: CPT | Mod: 26

## 2018-06-01 PROCEDURE — 99233 SBSQ HOSP IP/OBS HIGH 50: CPT | Mod: GC

## 2018-06-01 PROCEDURE — 71250 CT THORAX DX C-: CPT | Mod: 26

## 2018-06-01 PROCEDURE — 84165 PROTEIN E-PHORESIS SERUM: CPT | Mod: 26

## 2018-06-01 RX ORDER — HALOPERIDOL DECANOATE 100 MG/ML
2 INJECTION INTRAMUSCULAR ONCE
Qty: 0 | Refills: 0 | Status: COMPLETED | OUTPATIENT
Start: 2018-06-01 | End: 2018-06-01

## 2018-06-01 RX ADMIN — ATORVASTATIN CALCIUM 80 MILLIGRAM(S): 80 TABLET, FILM COATED ORAL at 21:18

## 2018-06-01 RX ADMIN — DONEPEZIL HYDROCHLORIDE 5 MILLIGRAM(S): 10 TABLET, FILM COATED ORAL at 21:18

## 2018-06-01 RX ADMIN — PREGABALIN 1000 MICROGRAM(S): 225 CAPSULE ORAL at 12:16

## 2018-06-01 RX ADMIN — Medication 2: at 12:16

## 2018-06-01 RX ADMIN — Medication 81 MILLIGRAM(S): at 12:16

## 2018-06-01 RX ADMIN — Medication 1 MILLIGRAM(S): at 12:16

## 2018-06-01 RX ADMIN — HALOPERIDOL DECANOATE 2 MILLIGRAM(S): 100 INJECTION INTRAMUSCULAR at 00:59

## 2018-06-01 NOTE — CHART NOTE - NSCHARTNOTEFT_GEN_A_CORE
NUTRITION SERVICES     Upon Nutritional Assessment by the Registered Dietitian your patient was determined to meet criteria/ has evidence of the following diagnosis/diagnoses:  [ ] Mild Protein Calorie Malnutrition   [ x] Moderate Protein Calorie Malnutrition   [ ] Severe Protein Calorie Malnutrition   [ ] Unspecified Protein Calorie Malnutrition   [ ] Underweight / BMI <19  [ ] Morbid Obesity / BMI >40    Findings as based on:  •  Comprehensive nutrition assessment and consultation   •  Food acceptance and intake status from observations by staff .    •  Treatment:  The following diet has been recommended: Refer to initial assessment completed under document section.

## 2018-06-01 NOTE — CHART NOTE - NSCHARTNOTEFT_GEN_A_CORE
Updated plan:    TLSO brace for comfort ordered for the patient  WBAT  PT  Oncology consult  rad/onc consult recommended   no acute intervention needed

## 2018-06-01 NOTE — CONSULT NOTE ADULT - PROBLEM SELECTOR RECOMMENDATION 9
likely prerenal already improving  monitor  check UA
-with acute/subactue fracture of T7/T12 lesion and lytic lesion of L5 vertebra. MRI is read as diffuse marrow infiltration/enhancement and hence oncology has been consulted.  -Unclear etiology.  -Recc CT scan chest/abd/pelvis to look for any abnormality given MRI findings.  -Pleas get SPEP/UPEP/ JONAS/ Immunoglobulins/Serum FLC ratio and PSA.  -Pain control.  -Ortho evaluation for fracture.  -Will ultimately need biospy to determine the etiology.

## 2018-06-01 NOTE — CHART NOTE - NSCHARTNOTEFT_GEN_A_CORE
MRI findings reviewed with Pts daughter Eulalia   Ortho spine c/s called fro eval of Acute/ sub acute fracture   Oncology c/s called- Will proceed with CT Abd/ pelvis/ chest no contrast   Will Call IR to schedule IR guided Biopsy   Will await Ortho/ IR/ONc in put   D/w DR Leyva

## 2018-06-01 NOTE — DIETITIAN INITIAL EVALUATION ADULT. - OTHER INFO
Pt seen for nutrition consult for calorie count and RD visit/ 87 y/o male admitted with the DX of Ataxic gate, weight loss, ERIKA, reported to have fair po  by RN since admission, approximately 75% , however as per medical charts pt with poor po and weight loss of 30 pounds also reported in 3 weeks. Recommend to initiate calorie  x 3 days for better estimation  caloric intake. LABS reviewed, elevated AIC noted, Current diet remain appropriate, recommend to add Glucerna shake 1 can po 3 x daily to increase caloric and po intake.  Nutrition information not possible relate with mental illness. Discussed with RN.  Considering weight loss of 20% in 3 week , < 75% po in 3 weeks and based on nutrtion focused physical exam, pt meets the criteria for moderate Mal nutrition.

## 2018-06-01 NOTE — CONSULT NOTE ADULT - SUBJECTIVE AND OBJECTIVE BOX
HPI:    Mr. De La Fuente, is a 87 yo M h/o HTN, DM,  CAD s/p CABG, SDH, dementia brought by family for back pain and unsteady gait. Unable to obtain history from pt due to dementia. Per son at bedside, pt fell in the bathroom three weeks ago. Fall unwitnessed but heard by family who responded immediately and found pt on the floor. There was no LOC, unclear if any head trauma.  Since that fall pt has been complaining of back pain and has had rapid decline in ability to ambulate. Prior to fall pt ambulated independently using cane but now needs assistance from family. Also unable to tolerate sitting and prefers to be laying in bed. Family has also noted that he appears weak, appetite has been poor and he has to be forced to eat. He has had about 30lb weight loss in the past 3 months. Family has not noted significant cognitive decline. At baseline pt usually AAOx1 sometimes recognizes place and recognizes family members, able to hold a conversation and make his needs known. However, he needs assistance with all ADLs, including eating and bathing.     per son, patient also having problem urinating. No bowel/bladder incontinence     In ED pt given 500cc bolus  VS:  111/68  62  97.7  16  99% on RA (30 May 2018 21:24)    PAST MEDICAL & SURGICAL HISTORY:  Hep C w/o coma, chronic  Tubular adenoma of colon  Gastritis: + h pylori  DM (diabetes mellitus)  GERD (gastroesophageal reflux disease)  HTN (hypertension)  History of subdural hematoma (post traumatic)  S/P CABG x 3  No Past Surgical History    FAMILY HISTORY:  No pertinent family history in first degree relatives    Social History  MEDICATIONS  (STANDING):  aspirin enteric coated 81 milliGRAM(s) Oral daily  atorvastatin 80 milliGRAM(s) Oral at bedtime  cyanocobalamin 1000 MICROGram(s) Oral daily  dextrose 5%. 1000 milliLiter(s) (50 mL/Hr) IV Continuous <Continuous>  dextrose 50% Injectable 12.5 Gram(s) IV Push once  dextrose 50% Injectable 25 Gram(s) IV Push once  dextrose 50% Injectable 25 Gram(s) IV Push once  donepezil 5 milliGRAM(s) Oral at bedtime  folic acid 1 milliGRAM(s) Oral daily  insulin lispro (HumaLOG) corrective regimen sliding scale   SubCutaneous three times a day before meals  insulin lispro (HumaLOG) corrective regimen sliding scale   SubCutaneous at bedtime  metoprolol succinate  milliGRAM(s) Oral daily  sodium chloride 0.9%. 1000 milliLiter(s) (75 mL/Hr) IV Continuous <Continuous>    MEDICATIONS  (PRN):  acetaminophen   Tablet. 650 milliGRAM(s) Oral every 6 hours PRN back pain  dextrose 40% Gel 15 Gram(s) Oral once PRN Blood Glucose LESS THAN 70 milliGRAM(s)/deciliter  glucagon  Injectable 1 milliGRAM(s) IntraMuscular once PRN Glucose LESS THAN 70 milligrams/deciliter    No Known Allergies    REVIEW OF SYSTEMS    10 points ROS is negative except for the ones in HPI.    Vital Signs Last 24 Hrs  T(C): 36.2 (01 Jun 2018 12:26), Max: 36.6 (01 Jun 2018 06:11)  T(F): 97.1 (01 Jun 2018 12:26), Max: 97.8 (01 Jun 2018 06:11)  HR: 94 (01 Jun 2018 12:26) (74 - 94)  BP: 106/71 (01 Jun 2018 12:26) (105/61 - 116/64)  BP(mean): --  RR: 18 (01 Jun 2018 12:26) (18 - 20)  SpO2: 100% (01 Jun 2018 12:26) (98% - 100%)    Physical Examination  Constitutional: Alert, oriented X 1  Eyes: Normal  ENMT: normal  Neck: No lymphadenopathy  Respiratory: b/l clear to auscultation  Cardiovascular: S1,S2,M0  Abdomen: Soft, non tender, BS present  Extremities: soft, no edema  Neurological: intact  Skin: no petechiae  Musculoskeletal: normal  Psychiatric: normal  Back tenderness+                            14.6   9.95  )-----------( 123      ( 01 Jun 2018 06:10 )             44.9     06-01    139  |  105  |  18  ----------------------------<  131<H>  4.2   |  22  |  1.46<H>    Ca    10.1      01 Jun 2018 06:10  Phos  3.2     06-01  Mg     1.8     06-01    TPro  8.5<H>  /  Alb  x   /  TBili  x   /  DBili  x   /  AST  x   /  ALT  x   /  AlkPhos  x   06-01      Radiology  < from: MR Lumbar Spine No Cont (05.31.18 @ 22:27) >    EXAM:  MR SPINE LUMBAR      EXAM:  MR SPINE THORACIC        PROCEDURE DATE:  May 31 2018         INTERPRETATION:  CLINICAL INFORMATION: Ataxic gait. Dementia. Fall.   Questionable thoracic vertebral fractures. Can't walk with back pain and   weakness..    TECHNIQUE: Noncontrast MR of the thoracic and lumbar spine was performed.   Sagittal STIR and sagittal and axial T1 and T2-weighted images through   the lumbar spine were obtained.    COMPARISON: Thoracic and lumbar radiographs 5/30/2018. CT abdomen and   pelvis 1/21/2018..    FINDINGS:  There are compression deformities of the T7 and T12 vertebral bodies is   increased marrow edema compatible with acute/subacute compression   fractures. There is bony retropulsion at the T7 level causing moderate   spinal canal stenosis without evidence of cord edema or signal   abnormality.    There is no cord compression. The spinal cord demonstrates normal course   and caliber without intrinsic cord signal abnormality.    Of note is abnormal marrow signal related to compression fractures which   traverses into the posterior elements suggesting pathologic as opposed to   insufficiency fractures.     In addition, focal radiolucencies within the bony vertebrae on recent CT   imaging of 1/21/2018 corresponds to hypointense T1 and heterogeneous T2   marrow signal suspicious for a diffusely infiltrative marrow process   which may be related to metastatic malignancy versus multiple myeloma.   Please correlate clinically.    Of note is an expansile lytic lesion demonstrating abnormal signal within   the left L5 transverse process.    Scattered vertebral hemangiomas are noted.    The conus terminates at the T12-L1 vertebral level.    Multilevel disc height loss and disc desiccation is noted.    DISC LEVEL EVALUATION:    No significant thoracic disc bulge. No evidence of neural foraminal   narrowing. Moderate spinal stenosis at T7 vertebral body level. No other   evidence of spinal canal stenosis of the thoracic level.    T12/L1: Mild bilateral foraminal disc protrusions with asymmetric left   facet arthrosis resulting in mild left foraminal narrowing. No spinal   canal stenosis.  L1/L2: Minimal bilateral foraminal disc protrusions. No foraminal   narrowing or spinal canal stenosis.  L2/L3: Minimal bilateral foraminal disc protrusions. No foraminal   narrowing or spinal canal stenosis.  L3/L4: No disc bulge. No foraminal narrowing or spinal canal stenosis.  L4/L5: Small central and right foraminal disc protrusions with asymmetric   right facet arthrosis and ligamentum flavum hypertrophy resulting in   moderate right foraminal narrowing. No left foraminal narrowing. No   spinal canal stenosis.  L5/S1: Sacralized L5 vertebral body. No disc bulge, foraminal narrowing,   or spinal canal stenosis.    SI JOINTS: The visualized portions are unremarkable.  PARASPINAL MUSCLE AND SOFT TISSUES: Paraspinal muscular atrophy.  INTRAABDOMINAL/INTRAPELVIC SOFT TISSUES: Bilateral renal atrophy.    IMPRESSION:   Acute/subacute pathologic compression deformities of the T7 and T12   vertebral bodies. There is bony retropulsion at the T7 level causing   moderate spinal canal stenosis without cord edema or signal abnormality.    No cord compression.    Heterogeneity of the marrow in addition, raises the possibility for an   infiltrative marrow process including metastatic disease from an unknown   primary versus changes related to multiple myeloma. Please correlate   clinically.    In particular, there is an expansile lytic lesion within the left L5   transverse process.    Moderate right L4-L5 foraminal narrowing secondary to small foraminal   disc protrusion and facet arthrosis/ligamentum flavum hypertrophy.    Multilevel degenerative changes as described above.              ANGIE CARDOSO M.D., RADIOLOGY RESIDENT  This document has been electronically signed.  EDDI DOWELL M.D., ATTENDING RADIOLOGIST  This document has been electronically signed. Jun 1 2018 10:55AM                  < end of copied text >

## 2018-06-01 NOTE — CONSULT NOTE ADULT - ASSESSMENT
88yoM w/CAD s/p CABG, dementia, CKD admitted for back pain with recent MRI showing T7 and T12 acute/subacute compression fractures, as well as L5 left transverse process expansile lytic lesion. IR consulted for bone lesion biopsy.    Discussed with primary team. At this time, IR does not recommend bone lesion biopsy for the following reason: Patient is an 89yo patient with dementia, who has a L5 left transverse process lesion, which is also seen on prior CT A/P 1/2018. Regardless of biopsy results, IR team believes the management for the patient would not change. If need be, the IR team would be happy to be a part of the discussion with the patient, oncology and primary team.

## 2018-06-01 NOTE — CONSULT NOTE ADULT - SUBJECTIVE AND OBJECTIVE BOX
88yoM w/CAD s/p CABG, dementia, CKD admitted for back pain with recent MRI showing T7 and T12 acute/subacute compression fractures, as well as L5 left transverse process expansile lytic lesion.  Onc consulted , susp of mets unknown primary. Pt walked 50 ft w PT       Gen: Pt awake, does not follow commands, nonverbal   Spine : no deformity or point tenderness   Ext: pt moves toes freely sensory grossly intact, DP pulse 2+       < from: MR Thoracic Spine No Cont (05.31.18 @ 22:27) >  EXAM:  MR SPINE THORACIC        PROCEDURE DATE:  May 31 2018         INTERPRETATION:  CLINICAL INFORMATION: Ataxic gait. Dementia. Fall.   Questionable thoracic vertebral fractures. Can't walk with back pain and   weakness..    TECHNIQUE: Noncontrast MR of the thoracic and lumbar spine was performed.   Sagittal STIR and sagittal and axial T1 and T2-weighted images through   the lumbar spine were obtained.    COMPARISON: Thoracic and lumbar radiographs 5/30/2018. CT abdomen and   pelvis 1/21/2018..    FINDINGS:  There are compression deformities of the T7 and T12 vertebral bodies is   increased marrow edema compatible with acute/subacute compression   fractures. There is bony retropulsion at the T7 level causing moderate   spinal canal stenosis without evidence of cord edema or signal   abnormality.    There is no cord compression. The spinal cord demonstrates normal course   and caliber without intrinsic cord signal abnormality.    Of note is abnormal marrow signal related to compression fractures which   traverses into the posterior elements suggesting pathologic as opposed to   insufficiency fractures.     In addition, focal radiolucencies within the bony vertebrae on recent CT   imaging of 1/21/2018 corresponds to hypointense T1 and heterogeneous T2   marrow signal suspicious for a diffusely infiltrative marrow process   which may be related to metastatic malignancy versus multiple myeloma.   Please correlate clinically.      < end of copied text >      < from: MR Lumbar Spine No Cont (05.31.18 @ 22:27) >    TECHNIQUE: Noncontrast MR of the thoracic and lumbar spine was performed.   Sagittal STIR and sagittal and axial T1 and T2-weighted images through   the lumbar spine were obtained.    COMPARISON: Thoracic and lumbar radiographs 5/30/2018. CT abdomen and   pelvis 1/21/2018..    FINDINGS:  There are compression deformities of the T7 and T12 vertebral bodies is   increased marrow edema compatible with acute/subacute compression   fractures. There is bony retropulsion at the T7 level causing moderate   spinal canal stenosis without evidence of cord edema or signal   abnormality.    There is no cord compression. The spinal cord demonstrates normal course   and caliber without intrinsic cord signal abnormality.    Of note is abnormal marrow signal related to compression fractures which   traverses into the posterior elements suggesting pathologic as opposed to   insufficiency fractures.     In addition, focal radiolucencies within the bony vertebrae on recent CT   imaging of 1/21/2018 corresponds to hypointense T1 and heterogeneous T2   marrow signal suspicious for a diffusely infiltrative marrow process   which may be related to metastatic malignancy versus multiple myeloma.   Please correlate clinically.    Of note is an expansile lytic lesion demonstrating abnormal signal within   the left L5 transverse process.    Scattered vertebral hemangiomas are noted.    The conus terminates at the T12-L1 vertebral level.    Multilevel disc height loss and disc desiccation is noted.    DISC LEVEL EVALUATION:    < end of copied text >  < from: Xray Thoracic/Lumbar Spine 1 View (05.30.18 @ 19:40) >    EXAM:  SPINE SNGLE VIEW THORACOLUMBAR      EXAM:  RAD LUMBAR W OBLIQUES      EXAM:  RAD THORACIC SPINE        PROCEDURE DATE:  May 30 2018         INTERPRETATION:  CLINICAL INFORMATION: Back pain status post fall.   Questionable L4 and T10 fractures.    TECHNIQUE: 2 views of the thoracic spine and 4 views of the lumbar spine   were obtained on 5/30/2018.    COMPARISON: CT abdomen pelvis 1/21/2018.    FINDINGS:     Generalized osteopenia. Several compression deformities of vertebrae   likely secondary to osteopenia. No obvious acute vertebral fracture.   If   pain persists, MRI would be more sensitive.    IMPRESSION:   Age indeterminate compression deformities of thoracic and vertebral   bodies. Further evaluation of acuity can be obtained with a dedicated MRI   if clinically indicated, assuming there are no MRI contraindications.        < end of copied text >  < from: Xray Lumbosacral Spine + Obliques (05.30.18 @ 19:41) >    EXAM:  SPINE SNGLE VIEW THORACOLUMBAR      EXAM:  RAD LUMBAR W OBLIQUES      EXAM:  RAD THORACIC SPINE        PROCEDURE DATE:  May 30 2018         INTERPRETATION:  CLINICAL INFORMATION: Back pain status post fall.   Questionable L4 and T10 fractures.    TECHNIQUE: 2 views of the thoracic spine and 4 views of the lumbar spine   were obtained on 5/30/2018.    COMPARISON: CT abdomen pelvis 1/21/2018.    FINDINGS:     Generalized osteopenia. Several compression deformities of vertebrae   likely secondary to osteopenia. No obvious acute vertebral fracture.   If   pain persists, MRI would be more sensitive.    IMPRESSION:   Age indeterminate compression deformities of thoracic and vertebral   bodies. Further evaluation of acuity can be obtained with a dedicated MRI   if clinically indicated, assuming there are no MRI contraindications.        < end of copied text >

## 2018-06-01 NOTE — CONSULT NOTE ADULT - ASSESSMENT
A/P Vertebral comp fracture as above        Pain control        PT WBAT        F/U ONC rec        Above D/W Dr Antonio

## 2018-06-01 NOTE — CONSULT NOTE ADULT - SUBJECTIVE AND OBJECTIVE BOX
Patient is a 88y old  Male who presents with a chief complaint of Back pain, unsteady gait (30 May 2018 21:24)    88yoM w/CAD s/p CABG, dementia, CKD admitted for back pain with recent MRI showing T7 and T12 acute/subacute compression fractures, as well as L5 left transverse process expansile lytic lesion. IR consulted for bone lesion biopsy.    Discussed with primary team. At this time, IR does not recommend bone lesion biopsy for the following reason: Patient is an 87yo patient with dementia, who has a L5 left transverse process lesion, which is also seen on prior CT A/P 1/2018. Regardless of biopsy results, IR team believes the management for the patient would not change. If need be, the IR team would be happy to be a part of the discussion with the patient, oncology and primary team.    PAST MEDICAL & SURGICAL HISTORY:  Hep C w/o coma, chronic  Tubular adenoma of colon  Gastritis: + h pylori  DM (diabetes mellitus)  GERD (gastroesophageal reflux disease)  HTN (hypertension)  History of subdural hematoma (post traumatic)  S/P CABG x 3    Vital Signs Last 24 Hrs  T(C): 36.2 (01 Jun 2018 12:26), Max: 36.6 (01 Jun 2018 06:11)  T(F): 97.1 (01 Jun 2018 12:26), Max: 97.8 (01 Jun 2018 06:11)  HR: 94 (01 Jun 2018 12:26) (74 - 94)  BP: 106/71 (01 Jun 2018 12:26) (105/61 - 116/64)  BP(mean): --  RR: 18 (01 Jun 2018 12:26) (18 - 20)  SpO2: 100% (01 Jun 2018 12:26) (98% - 100%)      CBC                        14.6   9.95  )-----------( 123      ( 01 Jun 2018 06:10 )             44.9       Chemistry  06-01    139  |  105  |  18  ----------------------------<  131<H>  4.2   |  22  |  1.46<H>    Ca    10.1      01 Jun 2018 06:10  Phos  3.2     06-01  Mg     1.8     06-01    TPro  8.5<H>  /  Alb  x   /  TBili  x   /  DBili  x   /  AST  x   /  ALT  x   /  AlkPhos  x   06-01

## 2018-06-01 NOTE — CONSULT NOTE ADULT - ATTENDING COMMENTS
This 88 year old male with significant dementia presents to the hospital with a fracture of T 12 and a lytic lesion at L5. He is able to raise his legs from the bed when he is roused from sleep. Otherwise he is not capable of giving a history . He is not oriented to his current location or situation. I agree with steroid therapy. Biopsy of the bone lesion may be performed to see if palliative therapy may be offered. Karnofsky performance status is 30% ECOG PS 4.

## 2018-06-01 NOTE — CONSULT NOTE ADULT - ASSESSMENT
Mr. De La Fuente is a 89 y/o M, with h/o dementia, CAD, HTN Mr. De La Fuente is a 87 y/o M, with h/o dementia, CAD, HTN, admitted after a fall and back pain. Off note, patient has been having fatigue and more than 30 lbs weight loss for 3 months. He also has urinary rentention. Mr. De La Fuente is a 89 y/o M, with h/o dementia, CAD, HTN, admitted after a fall and back pain. Off note, patient has been having fatigue and more than 30 lbs weight loss for 3 months. He also has urinary retention.

## 2018-06-01 NOTE — CONSULT NOTE ADULT - PROBLEM SELECTOR RECOMMENDATION 2
-Nephrology on board.  -[facility] bladder scan/ US kidney as patient has not been urinating for 2-3 days to r/o obstruction/retention.

## 2018-06-01 NOTE — CONSULT NOTE ADULT - PROBLEM SELECTOR RECOMMENDATION 3
controlled  monitor
-Secondary to dementia.  -Recc TSH, Vitamin D.  -Nutritional consult.     D/W team    Please page at [phone] with questions or concerns.

## 2018-06-02 LAB
BUN SERPL-MCNC: 14 MG/DL — SIGNIFICANT CHANGE UP (ref 7–23)
CALCIUM SERPL-MCNC: 10.3 MG/DL — SIGNIFICANT CHANGE UP (ref 8.4–10.5)
CHLORIDE SERPL-SCNC: 103 MMOL/L — SIGNIFICANT CHANGE UP (ref 98–107)
CO2 SERPL-SCNC: 23 MMOL/L — SIGNIFICANT CHANGE UP (ref 22–31)
CREAT SERPL-MCNC: 1.37 MG/DL — HIGH (ref 0.5–1.3)
GLUCOSE BLDC GLUCOMTR-MCNC: 136 MG/DL — HIGH (ref 70–99)
GLUCOSE BLDC GLUCOMTR-MCNC: 144 MG/DL — HIGH (ref 70–99)
GLUCOSE BLDC GLUCOMTR-MCNC: 151 MG/DL — HIGH (ref 70–99)
GLUCOSE BLDC GLUCOMTR-MCNC: 199 MG/DL — HIGH (ref 70–99)
GLUCOSE SERPL-MCNC: 135 MG/DL — HIGH (ref 70–99)
IGA FLD-MCNC: 173 MG/DL — SIGNIFICANT CHANGE UP (ref 70–400)
IGG FLD-MCNC: 3143 MG/DL — HIGH (ref 700–1600)
IGM SERPL-MCNC: 33 MG/DL — LOW (ref 40–230)
KAPPA FREE LIGHT CHAINS, SERUM: 10.43 MG/DL — HIGH (ref 0.33–1.94)
KAPPA FREE LIGHT CHAINS, SERUM: 9.28 MG/DL — HIGH (ref 0.33–1.94)
LAMBDA FREE LIGHT CHAINS, SERUM: 1.67 MG/DL — SIGNIFICANT CHANGE UP (ref 0.57–2.63)
LAMBDA FREE LIGHT CHAINS, SERUM: 1.82 MG/DL — SIGNIFICANT CHANGE UP (ref 0.57–2.63)
POTASSIUM SERPL-MCNC: 4.3 MMOL/L — SIGNIFICANT CHANGE UP (ref 3.5–5.3)
POTASSIUM SERPL-SCNC: 4.3 MMOL/L — SIGNIFICANT CHANGE UP (ref 3.5–5.3)
PROT SERPL-MCNC: 8.6 G/DL — HIGH (ref 6–8.3)
PROT UR-MCNC: 16.5 MG/DL — SIGNIFICANT CHANGE UP
PSA FLD-MCNC: 6.24 NG/ML — HIGH (ref 0–4)
SODIUM SERPL-SCNC: 137 MMOL/L — SIGNIFICANT CHANGE UP (ref 135–145)

## 2018-06-02 PROCEDURE — 84166 PROTEIN E-PHORESIS/URINE/CSF: CPT | Mod: 26

## 2018-06-02 RX ADMIN — ATORVASTATIN CALCIUM 80 MILLIGRAM(S): 80 TABLET, FILM COATED ORAL at 22:35

## 2018-06-02 RX ADMIN — PREGABALIN 1000 MICROGRAM(S): 225 CAPSULE ORAL at 11:47

## 2018-06-02 RX ADMIN — Medication 81 MILLIGRAM(S): at 11:47

## 2018-06-02 RX ADMIN — Medication 1: at 11:47

## 2018-06-02 RX ADMIN — Medication 100 MILLIGRAM(S): at 05:55

## 2018-06-02 RX ADMIN — Medication 650 MILLIGRAM(S): at 18:38

## 2018-06-02 RX ADMIN — Medication 1 MILLIGRAM(S): at 11:47

## 2018-06-02 RX ADMIN — DONEPEZIL HYDROCHLORIDE 5 MILLIGRAM(S): 10 TABLET, FILM COATED ORAL at 22:35

## 2018-06-02 RX ADMIN — Medication 650 MILLIGRAM(S): at 19:08

## 2018-06-03 LAB
BUN SERPL-MCNC: 19 MG/DL — SIGNIFICANT CHANGE UP (ref 7–23)
CALCIUM SERPL-MCNC: 9.5 MG/DL — SIGNIFICANT CHANGE UP (ref 8.4–10.5)
CHLORIDE SERPL-SCNC: 105 MMOL/L — SIGNIFICANT CHANGE UP (ref 98–107)
CO2 SERPL-SCNC: 22 MMOL/L — SIGNIFICANT CHANGE UP (ref 22–31)
CREAT SERPL-MCNC: 1.37 MG/DL — HIGH (ref 0.5–1.3)
GLUCOSE BLDC GLUCOMTR-MCNC: 110 MG/DL — HIGH (ref 70–99)
GLUCOSE BLDC GLUCOMTR-MCNC: 115 MG/DL — HIGH (ref 70–99)
GLUCOSE BLDC GLUCOMTR-MCNC: 124 MG/DL — HIGH (ref 70–99)
GLUCOSE BLDC GLUCOMTR-MCNC: 282 MG/DL — HIGH (ref 70–99)
GLUCOSE SERPL-MCNC: 132 MG/DL — HIGH (ref 70–99)
POTASSIUM SERPL-MCNC: 4.2 MMOL/L — SIGNIFICANT CHANGE UP (ref 3.5–5.3)
POTASSIUM SERPL-SCNC: 4.2 MMOL/L — SIGNIFICANT CHANGE UP (ref 3.5–5.3)
SODIUM SERPL-SCNC: 135 MMOL/L — SIGNIFICANT CHANGE UP (ref 135–145)

## 2018-06-03 RX ORDER — SODIUM CHLORIDE 9 MG/ML
1000 INJECTION INTRAMUSCULAR; INTRAVENOUS; SUBCUTANEOUS
Qty: 0 | Refills: 0 | Status: COMPLETED | OUTPATIENT
Start: 2018-06-03 | End: 2018-06-04

## 2018-06-03 RX ADMIN — Medication 81 MILLIGRAM(S): at 11:51

## 2018-06-03 RX ADMIN — PREGABALIN 1000 MICROGRAM(S): 225 CAPSULE ORAL at 11:51

## 2018-06-03 RX ADMIN — Medication 3: at 11:56

## 2018-06-03 RX ADMIN — ATORVASTATIN CALCIUM 80 MILLIGRAM(S): 80 TABLET, FILM COATED ORAL at 21:48

## 2018-06-03 RX ADMIN — Medication 1 MILLIGRAM(S): at 11:51

## 2018-06-03 RX ADMIN — Medication 100 MILLIGRAM(S): at 06:02

## 2018-06-03 RX ADMIN — DONEPEZIL HYDROCHLORIDE 5 MILLIGRAM(S): 10 TABLET, FILM COATED ORAL at 21:48

## 2018-06-03 RX ADMIN — SODIUM CHLORIDE 75 MILLILITER(S): 9 INJECTION INTRAMUSCULAR; INTRAVENOUS; SUBCUTANEOUS at 11:50

## 2018-06-03 RX ADMIN — SODIUM CHLORIDE 75 MILLILITER(S): 9 INJECTION INTRAMUSCULAR; INTRAVENOUS; SUBCUTANEOUS at 21:50

## 2018-06-04 LAB
BUN SERPL-MCNC: 17 MG/DL — SIGNIFICANT CHANGE UP (ref 7–23)
CALCIUM SERPL-MCNC: 9.1 MG/DL — SIGNIFICANT CHANGE UP (ref 8.4–10.5)
CHLORIDE SERPL-SCNC: 104 MMOL/L — SIGNIFICANT CHANGE UP (ref 98–107)
CO2 SERPL-SCNC: 21 MMOL/L — LOW (ref 22–31)
CREAT SERPL-MCNC: 1.34 MG/DL — HIGH (ref 0.5–1.3)
GLUCOSE BLDC GLUCOMTR-MCNC: 126 MG/DL — HIGH (ref 70–99)
GLUCOSE BLDC GLUCOMTR-MCNC: 128 MG/DL — HIGH (ref 70–99)
GLUCOSE BLDC GLUCOMTR-MCNC: 131 MG/DL — HIGH (ref 70–99)
GLUCOSE BLDC GLUCOMTR-MCNC: 158 MG/DL — HIGH (ref 70–99)
GLUCOSE BLDC GLUCOMTR-MCNC: 161 MG/DL — HIGH (ref 70–99)
GLUCOSE BLDC GLUCOMTR-MCNC: 173 MG/DL — HIGH (ref 70–99)
GLUCOSE SERPL-MCNC: 137 MG/DL — HIGH (ref 70–99)
HCT VFR BLD CALC: 45.7 % — SIGNIFICANT CHANGE UP (ref 39–50)
HGB BLD-MCNC: 15.1 G/DL — SIGNIFICANT CHANGE UP (ref 13–17)
INR BLD: 1.2 — HIGH (ref 0.88–1.17)
MCHC RBC-ENTMCNC: 27.8 PG — SIGNIFICANT CHANGE UP (ref 27–34)
MCHC RBC-ENTMCNC: 33 % — SIGNIFICANT CHANGE UP (ref 32–36)
MCV RBC AUTO: 84 FL — SIGNIFICANT CHANGE UP (ref 80–100)
NRBC # FLD: 0.03 — SIGNIFICANT CHANGE UP
PLATELET # BLD AUTO: 132 K/UL — LOW (ref 150–400)
PMV BLD: 9.3 FL — SIGNIFICANT CHANGE UP (ref 7–13)
POTASSIUM SERPL-MCNC: 4.2 MMOL/L — SIGNIFICANT CHANGE UP (ref 3.5–5.3)
POTASSIUM SERPL-SCNC: 4.2 MMOL/L — SIGNIFICANT CHANGE UP (ref 3.5–5.3)
PROT SERPL-MCNC: 8.2 G/DL — SIGNIFICANT CHANGE UP (ref 6–8.3)
PROTHROM AB SERPL-ACNC: 13.4 SEC — HIGH (ref 9.8–13.1)
RBC # BLD: 5.44 M/UL — SIGNIFICANT CHANGE UP (ref 4.2–5.8)
RBC # FLD: 15.9 % — HIGH (ref 10.3–14.5)
SODIUM SERPL-SCNC: 136 MMOL/L — SIGNIFICANT CHANGE UP (ref 135–145)
TSH SERPL-MCNC: 1.18 UIU/ML — SIGNIFICANT CHANGE UP (ref 0.27–4.2)
WBC # BLD: 8.87 K/UL — SIGNIFICANT CHANGE UP (ref 3.8–10.5)
WBC # FLD AUTO: 8.87 K/UL — SIGNIFICANT CHANGE UP (ref 3.8–10.5)

## 2018-06-04 PROCEDURE — 76775 US EXAM ABDO BACK WALL LIM: CPT | Mod: 26

## 2018-06-04 RX ORDER — HEPARIN SODIUM 5000 [USP'U]/ML
5000 INJECTION INTRAVENOUS; SUBCUTANEOUS EVERY 8 HOURS
Qty: 0 | Refills: 0 | Status: DISCONTINUED | OUTPATIENT
Start: 2018-06-04 | End: 2018-06-08

## 2018-06-04 RX ADMIN — Medication 1: at 17:44

## 2018-06-04 RX ADMIN — Medication 1: at 12:53

## 2018-06-04 RX ADMIN — DONEPEZIL HYDROCHLORIDE 5 MILLIGRAM(S): 10 TABLET, FILM COATED ORAL at 23:03

## 2018-06-04 RX ADMIN — ATORVASTATIN CALCIUM 80 MILLIGRAM(S): 80 TABLET, FILM COATED ORAL at 23:03

## 2018-06-04 RX ADMIN — HEPARIN SODIUM 5000 UNIT(S): 5000 INJECTION INTRAVENOUS; SUBCUTANEOUS at 23:03

## 2018-06-04 RX ADMIN — Medication 100 MILLIGRAM(S): at 05:01

## 2018-06-04 RX ADMIN — PREGABALIN 1000 MICROGRAM(S): 225 CAPSULE ORAL at 12:53

## 2018-06-04 RX ADMIN — Medication 1 MILLIGRAM(S): at 12:53

## 2018-06-04 RX ADMIN — SODIUM CHLORIDE 75 MILLILITER(S): 9 INJECTION INTRAMUSCULAR; INTRAVENOUS; SUBCUTANEOUS at 11:00

## 2018-06-04 RX ADMIN — HEPARIN SODIUM 5000 UNIT(S): 5000 INJECTION INTRAVENOUS; SUBCUTANEOUS at 15:00

## 2018-06-04 NOTE — CHART NOTE - NSCHARTNOTEFT_GEN_A_CORE
Primary team called to discuss the possibility of a bone marrow biopsy. Discussed with the team that although patient with elevated IgG and slightly elevated kappa light chain, we do not have a result from the immunofixation yet and the SPEP/UPEP was never sent, which would be the necessary studies to confirm a monoclonal gammopathy. Furthermore, discussed with medical attending that with elevated PSA value would favor first obtaining lytic lesion biopsy as may represent a metastatic prostate cancer versus MM, and if biopsy does show prostate cancer would possibly be able to avoid bone marrow biopsy. Medical attending Dr. Leyva in agreement.

## 2018-06-05 LAB
GAS PNL BLDMV: SIGNIFICANT CHANGE UP
GLUCOSE BLDC GLUCOMTR-MCNC: 131 MG/DL — HIGH (ref 70–99)
GLUCOSE BLDC GLUCOMTR-MCNC: 138 MG/DL — HIGH (ref 70–99)
GLUCOSE BLDC GLUCOMTR-MCNC: 164 MG/DL — HIGH (ref 70–99)
GLUCOSE BLDC GLUCOMTR-MCNC: 191 MG/DL — HIGH (ref 70–99)
KAPPA/LAMBDA FREE LIGHT CHAIN RATIO, SERUM: 5.1 — SIGNIFICANT CHANGE UP
KAPPA/LAMBDA FREE LIGHT CHAIN RATIO, SERUM: SIGNIFICANT CHANGE UP

## 2018-06-05 RX ORDER — SODIUM CHLORIDE 9 MG/ML
1000 INJECTION INTRAMUSCULAR; INTRAVENOUS; SUBCUTANEOUS
Qty: 0 | Refills: 0 | Status: DISCONTINUED | OUTPATIENT
Start: 2018-06-05 | End: 2018-06-06

## 2018-06-05 RX ORDER — SODIUM CHLORIDE 9 MG/ML
500 INJECTION INTRAMUSCULAR; INTRAVENOUS; SUBCUTANEOUS ONCE
Qty: 0 | Refills: 0 | Status: COMPLETED | OUTPATIENT
Start: 2018-06-05 | End: 2018-06-05

## 2018-06-05 RX ADMIN — HEPARIN SODIUM 5000 UNIT(S): 5000 INJECTION INTRAVENOUS; SUBCUTANEOUS at 13:08

## 2018-06-05 RX ADMIN — HEPARIN SODIUM 5000 UNIT(S): 5000 INJECTION INTRAVENOUS; SUBCUTANEOUS at 05:58

## 2018-06-05 RX ADMIN — DONEPEZIL HYDROCHLORIDE 5 MILLIGRAM(S): 10 TABLET, FILM COATED ORAL at 21:04

## 2018-06-05 RX ADMIN — SODIUM CHLORIDE 75 MILLILITER(S): 9 INJECTION INTRAMUSCULAR; INTRAVENOUS; SUBCUTANEOUS at 18:48

## 2018-06-05 RX ADMIN — Medication 1 MILLIGRAM(S): at 11:42

## 2018-06-05 RX ADMIN — PREGABALIN 1000 MICROGRAM(S): 225 CAPSULE ORAL at 11:42

## 2018-06-05 RX ADMIN — SODIUM CHLORIDE 500 MILLILITER(S): 9 INJECTION INTRAMUSCULAR; INTRAVENOUS; SUBCUTANEOUS at 13:49

## 2018-06-05 RX ADMIN — HEPARIN SODIUM 5000 UNIT(S): 5000 INJECTION INTRAVENOUS; SUBCUTANEOUS at 21:04

## 2018-06-05 RX ADMIN — Medication 1: at 16:55

## 2018-06-05 RX ADMIN — Medication 1: at 11:42

## 2018-06-05 RX ADMIN — ATORVASTATIN CALCIUM 80 MILLIGRAM(S): 80 TABLET, FILM COATED ORAL at 21:03

## 2018-06-05 RX ADMIN — Medication 100 MILLIGRAM(S): at 05:58

## 2018-06-05 NOTE — CHART NOTE - NSCHARTNOTEFT_GEN_A_CORE
Late Note Entry   Pt was OOB to chair and on routine Vitals- BP was 95/57- HR- 67  Ordered 500 ml fluid bolus- s/p bolus /67  Pt seen at bedside- No c/o dizziness/ cp/ sob-    Pt seen sitting on chair with TLSO brace and not eating dinner states"I am full"  Rechecked BP_ 92/57- Started pt on IVF NS at 75 cc/hr - Will recheck Late Note Entry   Pt was OOB to chair and on routine Vitals- BP was 95/57- HR- 67  Ordered 500 ml fluid bolus- s/p bolus /67  Pt seen at bedside- No c/o dizziness/ cp/ sob-    Pt seen sitting on chair with TLSO brace and not eating dinner states"I am full"  Rechecked BP_ 92/57- Started pt on IVF NS at 75 cc/hr - Will recheck  Denies light headenss/ cp Late Note Entry   Pt was OOB to chair and on routine Vitals- BP was 95/57- HR- 67  Ordered 500 ml fluid bolus- s/p bolus /67  Pt seen at bedside- No c/o dizziness/ cp/ sob-    Pt seen sitting on chair with TLSO brace and not eating dinner states"I am full"  Rechecked BP_ 92/57- Started pt on IVF NS at 75 cc/hr - Will recheck  Denies light headenss/ cp  D/w DR Leyva

## 2018-06-06 DIAGNOSIS — M89.9 DISORDER OF BONE, UNSPECIFIED: ICD-10-CM

## 2018-06-06 LAB
BUN SERPL-MCNC: 24 MG/DL — HIGH (ref 7–23)
CALCIUM SERPL-MCNC: 9.1 MG/DL — SIGNIFICANT CHANGE UP (ref 8.4–10.5)
CHLORIDE SERPL-SCNC: 103 MMOL/L — SIGNIFICANT CHANGE UP (ref 98–107)
CO2 SERPL-SCNC: 20 MMOL/L — LOW (ref 22–31)
CREAT SERPL-MCNC: 1.31 MG/DL — HIGH (ref 0.5–1.3)
GLUCOSE BLDC GLUCOMTR-MCNC: 121 MG/DL — HIGH (ref 70–99)
GLUCOSE BLDC GLUCOMTR-MCNC: 135 MG/DL — HIGH (ref 70–99)
GLUCOSE BLDC GLUCOMTR-MCNC: 155 MG/DL — HIGH (ref 70–99)
GLUCOSE BLDC GLUCOMTR-MCNC: 189 MG/DL — HIGH (ref 70–99)
GLUCOSE SERPL-MCNC: 126 MG/DL — HIGH (ref 70–99)
HCT VFR BLD CALC: 40.7 % — SIGNIFICANT CHANGE UP (ref 39–50)
HGB BLD-MCNC: 13.2 G/DL — SIGNIFICANT CHANGE UP (ref 13–17)
MCHC RBC-ENTMCNC: 28 PG — SIGNIFICANT CHANGE UP (ref 27–34)
MCHC RBC-ENTMCNC: 32.4 % — SIGNIFICANT CHANGE UP (ref 32–36)
MCV RBC AUTO: 86.2 FL — SIGNIFICANT CHANGE UP (ref 80–100)
NRBC # FLD: 0.07 — SIGNIFICANT CHANGE UP
PLATELET # BLD AUTO: 128 K/UL — LOW (ref 150–400)
PMV BLD: 9.4 FL — SIGNIFICANT CHANGE UP (ref 7–13)
POTASSIUM SERPL-MCNC: 4 MMOL/L — SIGNIFICANT CHANGE UP (ref 3.5–5.3)
POTASSIUM SERPL-SCNC: 4 MMOL/L — SIGNIFICANT CHANGE UP (ref 3.5–5.3)
RBC # BLD: 4.72 M/UL — SIGNIFICANT CHANGE UP (ref 4.2–5.8)
RBC # FLD: 15.5 % — HIGH (ref 10.3–14.5)
SODIUM SERPL-SCNC: 136 MMOL/L — SIGNIFICANT CHANGE UP (ref 135–145)
WBC # BLD: 10.41 K/UL — SIGNIFICANT CHANGE UP (ref 3.8–10.5)
WBC # FLD AUTO: 10.41 K/UL — SIGNIFICANT CHANGE UP (ref 3.8–10.5)

## 2018-06-06 RX ADMIN — Medication 1 MILLIGRAM(S): at 12:01

## 2018-06-06 RX ADMIN — HEPARIN SODIUM 5000 UNIT(S): 5000 INJECTION INTRAVENOUS; SUBCUTANEOUS at 05:08

## 2018-06-06 RX ADMIN — HEPARIN SODIUM 5000 UNIT(S): 5000 INJECTION INTRAVENOUS; SUBCUTANEOUS at 13:14

## 2018-06-06 RX ADMIN — Medication 1: at 17:02

## 2018-06-06 RX ADMIN — HEPARIN SODIUM 5000 UNIT(S): 5000 INJECTION INTRAVENOUS; SUBCUTANEOUS at 23:09

## 2018-06-06 RX ADMIN — PREGABALIN 1000 MICROGRAM(S): 225 CAPSULE ORAL at 12:01

## 2018-06-06 RX ADMIN — DONEPEZIL HYDROCHLORIDE 5 MILLIGRAM(S): 10 TABLET, FILM COATED ORAL at 23:09

## 2018-06-06 RX ADMIN — ATORVASTATIN CALCIUM 80 MILLIGRAM(S): 80 TABLET, FILM COATED ORAL at 23:09

## 2018-06-06 RX ADMIN — Medication 1: at 12:01

## 2018-06-06 NOTE — PROGRESS NOTE ADULT - ATTENDING COMMENTS
This patient has a thoracic spine lesion. We are awaiting biopsy to be performed on June 8. There is the finding of a monoclonal protein elevation. If the results show a plasmacytoma or if findings are compatible with myeloma, we may consider MPV therapy; provided that his performance status improves

## 2018-06-07 LAB
BASOPHILS # BLD AUTO: 0.04 K/UL — SIGNIFICANT CHANGE UP (ref 0–0.2)
BASOPHILS NFR BLD AUTO: 0.4 % — SIGNIFICANT CHANGE UP (ref 0–2)
BASOPHILS NFR SPEC: 0 % — SIGNIFICANT CHANGE UP (ref 0–2)
BUN SERPL-MCNC: 20 MG/DL — SIGNIFICANT CHANGE UP (ref 7–23)
CALCIUM SERPL-MCNC: 9.2 MG/DL — SIGNIFICANT CHANGE UP (ref 8.4–10.5)
CHLORIDE SERPL-SCNC: 104 MMOL/L — SIGNIFICANT CHANGE UP (ref 98–107)
CO2 SERPL-SCNC: 21 MMOL/L — LOW (ref 22–31)
CREAT SERPL-MCNC: 1.22 MG/DL — SIGNIFICANT CHANGE UP (ref 0.5–1.3)
EOSINOPHIL # BLD AUTO: 0.35 K/UL — SIGNIFICANT CHANGE UP (ref 0–0.5)
EOSINOPHIL NFR BLD AUTO: 3.5 % — SIGNIFICANT CHANGE UP (ref 0–6)
EOSINOPHIL NFR FLD: 0 % — SIGNIFICANT CHANGE UP (ref 0–6)
GIANT PLATELETS BLD QL SMEAR: PRESENT — SIGNIFICANT CHANGE UP
GLUCOSE BLDC GLUCOMTR-MCNC: 139 MG/DL — HIGH (ref 70–99)
GLUCOSE BLDC GLUCOMTR-MCNC: 150 MG/DL — HIGH (ref 70–99)
GLUCOSE BLDC GLUCOMTR-MCNC: 153 MG/DL — HIGH (ref 70–99)
GLUCOSE BLDC GLUCOMTR-MCNC: 159 MG/DL — HIGH (ref 70–99)
GLUCOSE SERPL-MCNC: 132 MG/DL — HIGH (ref 70–99)
HCT VFR BLD CALC: 40.8 % — SIGNIFICANT CHANGE UP (ref 39–50)
HGB BLD-MCNC: 13.4 G/DL — SIGNIFICANT CHANGE UP (ref 13–17)
IMM GRANULOCYTES # BLD AUTO: 0.03 # — SIGNIFICANT CHANGE UP
IMM GRANULOCYTES NFR BLD AUTO: 0.3 % — SIGNIFICANT CHANGE UP (ref 0–1.5)
LYMPHOCYTES # BLD AUTO: 6.19 K/UL — HIGH (ref 1–3.3)
LYMPHOCYTES # BLD AUTO: 62.6 % — HIGH (ref 13–44)
LYMPHOCYTES NFR SPEC AUTO: 19.1 % — SIGNIFICANT CHANGE UP (ref 13–44)
MAGNESIUM SERPL-MCNC: 1.6 MG/DL — SIGNIFICANT CHANGE UP (ref 1.6–2.6)
MCHC RBC-ENTMCNC: 28.1 PG — SIGNIFICANT CHANGE UP (ref 27–34)
MCHC RBC-ENTMCNC: 32.8 % — SIGNIFICANT CHANGE UP (ref 32–36)
MCV RBC AUTO: 85.5 FL — SIGNIFICANT CHANGE UP (ref 80–100)
MICROCYTES BLD QL: SIGNIFICANT CHANGE UP
MONOCYTES # BLD AUTO: 0.81 K/UL — SIGNIFICANT CHANGE UP (ref 0–0.9)
MONOCYTES NFR BLD AUTO: 8.2 % — SIGNIFICANT CHANGE UP (ref 2–14)
MONOCYTES NFR BLD: 6.8 % — SIGNIFICANT CHANGE UP (ref 2–9)
MYELOCYTES NFR BLD: 1.1 % — HIGH (ref 0–0)
NEUTROPHIL AB SER-ACNC: 61.8 % — SIGNIFICANT CHANGE UP (ref 43–77)
NEUTROPHILS # BLD AUTO: 2.47 K/UL — SIGNIFICANT CHANGE UP (ref 1.8–7.4)
NEUTROPHILS NFR BLD AUTO: 25 % — LOW (ref 43–77)
NEUTS BAND # BLD: 1.1 % — SIGNIFICANT CHANGE UP (ref 0–6)
NRBC # FLD: 0.03 — SIGNIFICANT CHANGE UP
PHOSPHATE SERPL-MCNC: 3.3 MG/DL — SIGNIFICANT CHANGE UP (ref 2.5–4.5)
PLATELET # BLD AUTO: 148 K/UL — LOW (ref 150–400)
PLATELET COUNT - ESTIMATE: SIGNIFICANT CHANGE UP
PMV BLD: 9.8 FL — SIGNIFICANT CHANGE UP (ref 7–13)
POTASSIUM SERPL-MCNC: 4.1 MMOL/L — SIGNIFICANT CHANGE UP (ref 3.5–5.3)
POTASSIUM SERPL-SCNC: 4.1 MMOL/L — SIGNIFICANT CHANGE UP (ref 3.5–5.3)
RBC # BLD: 4.77 M/UL — SIGNIFICANT CHANGE UP (ref 4.2–5.8)
RBC # FLD: 15.3 % — HIGH (ref 10.3–14.5)
REVIEW TO FOLLOW: YES — SIGNIFICANT CHANGE UP
SMUDGE CELLS # BLD: PRESENT — SIGNIFICANT CHANGE UP
SODIUM SERPL-SCNC: 135 MMOL/L — SIGNIFICANT CHANGE UP (ref 135–145)
VARIANT LYMPHS # BLD: 10.1 % — SIGNIFICANT CHANGE UP
WBC # BLD: 9.89 K/UL — SIGNIFICANT CHANGE UP (ref 3.8–10.5)
WBC # FLD AUTO: 9.89 K/UL — SIGNIFICANT CHANGE UP (ref 3.8–10.5)

## 2018-06-07 RX ORDER — MEMANTINE HYDROCHLORIDE 10 MG/1
10 TABLET ORAL
Qty: 0 | Refills: 0 | Status: DISCONTINUED | OUTPATIENT
Start: 2018-06-07 | End: 2018-06-13

## 2018-06-07 RX ADMIN — DONEPEZIL HYDROCHLORIDE 5 MILLIGRAM(S): 10 TABLET, FILM COATED ORAL at 21:58

## 2018-06-07 RX ADMIN — Medication 1 MILLIGRAM(S): at 12:11

## 2018-06-07 RX ADMIN — HEPARIN SODIUM 5000 UNIT(S): 5000 INJECTION INTRAVENOUS; SUBCUTANEOUS at 05:16

## 2018-06-07 RX ADMIN — HEPARIN SODIUM 5000 UNIT(S): 5000 INJECTION INTRAVENOUS; SUBCUTANEOUS at 21:59

## 2018-06-07 RX ADMIN — ATORVASTATIN CALCIUM 80 MILLIGRAM(S): 80 TABLET, FILM COATED ORAL at 21:58

## 2018-06-07 RX ADMIN — PREGABALIN 1000 MICROGRAM(S): 225 CAPSULE ORAL at 12:11

## 2018-06-07 RX ADMIN — HEPARIN SODIUM 5000 UNIT(S): 5000 INJECTION INTRAVENOUS; SUBCUTANEOUS at 14:52

## 2018-06-07 RX ADMIN — Medication 1: at 12:11

## 2018-06-07 RX ADMIN — Medication 1: at 16:46

## 2018-06-07 RX ADMIN — MEMANTINE HYDROCHLORIDE 10 MILLIGRAM(S): 10 TABLET ORAL at 17:02

## 2018-06-07 NOTE — CHART NOTE - NSCHARTNOTEFT_GEN_A_CORE
PRE-INTERVENTIONAL RADIOLOGY PROCEDURE NOTE    Patient Age: 89 y/o  Patient Gender: Male  Procedure (including site / side if known):  Biopsy of lytic lesion on L5  Diagnosis / Indication: Diagnosis/Treatment  Interventional Radiology Attending Physician: Blsa  Ordering Attending Physician: Gordon  Pertinent medical history: HTN, DM, CKDIII, CAD s/p CABG, SDH, dementia  Pertinent labs:                         13.4   9.89  )-----------( 148      ( 07 Jun 2018 06:15 )             40.8   06-07    135  |  104  |  20  ----------------------------<  132<H>  4.1   |  21<L>  |  1.22    Ca    9.2      07 Jun 2018 06:15  Phos  3.3     06-07  Mg     1.6     06-07    PT/INR; PTT - pending    Patient and Family aware? Yes      Attending / Resident / NP / PA   Jannet Coughlin  Contact #: [phone]

## 2018-06-08 LAB
APTT BLD: 155 SEC — CRITICAL HIGH (ref 27.5–37.4)
APTT BLD: 166.5 SEC — CRITICAL HIGH (ref 27.5–37.4)
APTT BLD: > 200 SEC — CRITICAL HIGH (ref 27.5–37.4)
BASOPHILS # BLD AUTO: 0.07 K/UL — SIGNIFICANT CHANGE UP (ref 0–0.2)
BASOPHILS NFR BLD AUTO: 0.8 % — SIGNIFICANT CHANGE UP (ref 0–2)
BUN SERPL-MCNC: 18 MG/DL — SIGNIFICANT CHANGE UP (ref 7–23)
CALCIUM SERPL-MCNC: 9.3 MG/DL — SIGNIFICANT CHANGE UP (ref 8.4–10.5)
CHLORIDE SERPL-SCNC: 102 MMOL/L — SIGNIFICANT CHANGE UP (ref 98–107)
CO2 SERPL-SCNC: 24 MMOL/L — SIGNIFICANT CHANGE UP (ref 22–31)
CREAT SERPL-MCNC: 1.16 MG/DL — SIGNIFICANT CHANGE UP (ref 0.5–1.3)
EOSINOPHIL # BLD AUTO: 0.39 K/UL — SIGNIFICANT CHANGE UP (ref 0–0.5)
EOSINOPHIL NFR BLD AUTO: 4.3 % — SIGNIFICANT CHANGE UP (ref 0–6)
GLUCOSE BLDC GLUCOMTR-MCNC: 149 MG/DL — HIGH (ref 70–99)
GLUCOSE BLDC GLUCOMTR-MCNC: 164 MG/DL — HIGH (ref 70–99)
GLUCOSE BLDC GLUCOMTR-MCNC: 169 MG/DL — HIGH (ref 70–99)
GLUCOSE BLDC GLUCOMTR-MCNC: 180 MG/DL — HIGH (ref 70–99)
GLUCOSE SERPL-MCNC: 154 MG/DL — HIGH (ref 70–99)
HCT VFR BLD CALC: 41 % — SIGNIFICANT CHANGE UP (ref 39–50)
HGB BLD-MCNC: 13.6 G/DL — SIGNIFICANT CHANGE UP (ref 13–17)
IMM GRANULOCYTES # BLD AUTO: 0.03 # — SIGNIFICANT CHANGE UP
IMM GRANULOCYTES NFR BLD AUTO: 0.3 % — SIGNIFICANT CHANGE UP (ref 0–1.5)
INR BLD: 1.14 — SIGNIFICANT CHANGE UP (ref 0.88–1.17)
INR BLD: 1.16 — SIGNIFICANT CHANGE UP (ref 0.88–1.17)
INR BLD: 1.2 — HIGH (ref 0.88–1.17)
LYMPHOCYTES # BLD AUTO: 5.38 K/UL — HIGH (ref 1–3.3)
LYMPHOCYTES # BLD AUTO: 59.8 % — HIGH (ref 13–44)
MAGNESIUM SERPL-MCNC: 1.8 MG/DL — SIGNIFICANT CHANGE UP (ref 1.6–2.6)
MCHC RBC-ENTMCNC: 27.4 PG — SIGNIFICANT CHANGE UP (ref 27–34)
MCHC RBC-ENTMCNC: 33.2 % — SIGNIFICANT CHANGE UP (ref 32–36)
MCV RBC AUTO: 82.5 FL — SIGNIFICANT CHANGE UP (ref 80–100)
MONOCYTES # BLD AUTO: 0.74 K/UL — SIGNIFICANT CHANGE UP (ref 0–0.9)
MONOCYTES NFR BLD AUTO: 8.2 % — SIGNIFICANT CHANGE UP (ref 2–14)
NEUTROPHILS # BLD AUTO: 2.39 K/UL — SIGNIFICANT CHANGE UP (ref 1.8–7.4)
NEUTROPHILS NFR BLD AUTO: 26.6 % — LOW (ref 43–77)
NRBC # FLD: 0.02 — SIGNIFICANT CHANGE UP
PHOSPHATE SERPL-MCNC: 3.6 MG/DL — SIGNIFICANT CHANGE UP (ref 2.5–4.5)
PLATELET # BLD AUTO: 155 K/UL — SIGNIFICANT CHANGE UP (ref 150–400)
PMV BLD: 9.5 FL — SIGNIFICANT CHANGE UP (ref 7–13)
POTASSIUM SERPL-MCNC: 4 MMOL/L — SIGNIFICANT CHANGE UP (ref 3.5–5.3)
POTASSIUM SERPL-SCNC: 4 MMOL/L — SIGNIFICANT CHANGE UP (ref 3.5–5.3)
PROTHROM AB SERPL-ACNC: 13.2 SEC — HIGH (ref 9.8–13.1)
PROTHROM AB SERPL-ACNC: 13.4 SEC — HIGH (ref 9.8–13.1)
PROTHROM AB SERPL-ACNC: 13.4 SEC — HIGH (ref 9.8–13.1)
RBC # BLD: 4.97 M/UL — SIGNIFICANT CHANGE UP (ref 4.2–5.8)
RBC # FLD: 15.4 % — HIGH (ref 10.3–14.5)
SODIUM SERPL-SCNC: 136 MMOL/L — SIGNIFICANT CHANGE UP (ref 135–145)
WBC # BLD: 9 K/UL — SIGNIFICANT CHANGE UP (ref 3.8–10.5)
WBC # FLD AUTO: 9 K/UL — SIGNIFICANT CHANGE UP (ref 3.8–10.5)

## 2018-06-08 RX ORDER — SODIUM CHLORIDE 9 MG/ML
1000 INJECTION, SOLUTION INTRAVENOUS
Qty: 0 | Refills: 0 | Status: DISCONTINUED | OUTPATIENT
Start: 2018-06-08 | End: 2018-06-12

## 2018-06-08 RX ORDER — SODIUM CHLORIDE 9 MG/ML
1000 INJECTION INTRAMUSCULAR; INTRAVENOUS; SUBCUTANEOUS
Qty: 0 | Refills: 0 | Status: DISCONTINUED | OUTPATIENT
Start: 2018-06-08 | End: 2018-06-08

## 2018-06-08 RX ADMIN — ATORVASTATIN CALCIUM 80 MILLIGRAM(S): 80 TABLET, FILM COATED ORAL at 21:39

## 2018-06-08 RX ADMIN — SODIUM CHLORIDE 50 MILLILITER(S): 9 INJECTION, SOLUTION INTRAVENOUS at 13:12

## 2018-06-08 RX ADMIN — HEPARIN SODIUM 5000 UNIT(S): 5000 INJECTION INTRAVENOUS; SUBCUTANEOUS at 05:21

## 2018-06-08 RX ADMIN — Medication 1: at 17:08

## 2018-06-08 RX ADMIN — MEMANTINE HYDROCHLORIDE 10 MILLIGRAM(S): 10 TABLET ORAL at 17:08

## 2018-06-08 RX ADMIN — Medication 100 MILLIGRAM(S): at 05:21

## 2018-06-08 RX ADMIN — MEMANTINE HYDROCHLORIDE 10 MILLIGRAM(S): 10 TABLET ORAL at 05:21

## 2018-06-08 RX ADMIN — DONEPEZIL HYDROCHLORIDE 5 MILLIGRAM(S): 10 TABLET, FILM COATED ORAL at 21:39

## 2018-06-08 NOTE — PROVIDER CONTACT NOTE (CRITICAL VALUE NOTIFICATION) - SITUATION
89 yo male dx ataxic gait, unsteady gait had a fall from home. MRI showed T7 and T12 fracture. Pt supposed to be going for L5 biopsy in IR. aPTT elevated, pt was on heparin subcutaneous.

## 2018-06-08 NOTE — PROVIDER CONTACT NOTE (CRITICAL VALUE NOTIFICATION) - SITUATION
Pt has T7 and T12 fracture. Pt having L5 biopsy done by IR today @10:00, aPTT elevated, pt not on heparin gtt, pt getting heparin injectable subcutaneous q 8hrs.

## 2018-06-08 NOTE — PROVIDER CONTACT NOTE (CRITICAL VALUE NOTIFICATION) - SITUATION
89 yo male dx ataxic gait, unsteady gait had a fall from home. MRI showed T7 and T12 fracture. Pt going for L5 biopsy in IR. aPTT elevated, pt on subcutaneous heparin 5000units q 8hrs.

## 2018-06-08 NOTE — PROVIDER CONTACT NOTE (CRITICAL VALUE NOTIFICATION) - BACKGROUND
87yo male w/ Alzheimer's dementia, diabetes, unsteady gait, s/p fall @home complaining of back pain, MRI showed spine fracture

## 2018-06-09 LAB
APTT BLD: 50.2 SEC — HIGH (ref 27.5–37.4)
BASOPHILS # BLD AUTO: 0.05 K/UL — SIGNIFICANT CHANGE UP (ref 0–0.2)
BASOPHILS NFR BLD AUTO: 0.5 % — SIGNIFICANT CHANGE UP (ref 0–2)
BUN SERPL-MCNC: 16 MG/DL — SIGNIFICANT CHANGE UP (ref 7–23)
CALCIUM SERPL-MCNC: 9.2 MG/DL — SIGNIFICANT CHANGE UP (ref 8.4–10.5)
CHLORIDE SERPL-SCNC: 103 MMOL/L — SIGNIFICANT CHANGE UP (ref 98–107)
CO2 SERPL-SCNC: 25 MMOL/L — SIGNIFICANT CHANGE UP (ref 22–31)
CREAT SERPL-MCNC: 1.16 MG/DL — SIGNIFICANT CHANGE UP (ref 0.5–1.3)
EOSINOPHIL # BLD AUTO: 0.35 K/UL — SIGNIFICANT CHANGE UP (ref 0–0.5)
EOSINOPHIL NFR BLD AUTO: 3.4 % — SIGNIFICANT CHANGE UP (ref 0–6)
FACT II CIRC INHIB PPP QL: 11.6 SEC — SIGNIFICANT CHANGE UP (ref 9.8–13.1)
FACT II CIRC INHIB PPP QL: 37 SEC — SIGNIFICANT CHANGE UP (ref 27.5–37.4)
GLUCOSE BLDC GLUCOMTR-MCNC: 158 MG/DL — HIGH (ref 70–99)
GLUCOSE BLDC GLUCOMTR-MCNC: 159 MG/DL — HIGH (ref 70–99)
GLUCOSE BLDC GLUCOMTR-MCNC: 169 MG/DL — HIGH (ref 70–99)
GLUCOSE BLDC GLUCOMTR-MCNC: 184 MG/DL — HIGH (ref 70–99)
GLUCOSE BLDC GLUCOMTR-MCNC: 210 MG/DL — HIGH (ref 70–99)
GLUCOSE SERPL-MCNC: 163 MG/DL — HIGH (ref 70–99)
HCT VFR BLD CALC: 40.7 % — SIGNIFICANT CHANGE UP (ref 39–50)
HGB BLD-MCNC: 13 G/DL — SIGNIFICANT CHANGE UP (ref 13–17)
IMM GRANULOCYTES # BLD AUTO: 0.04 # — SIGNIFICANT CHANGE UP
IMM GRANULOCYTES NFR BLD AUTO: 0.4 % — SIGNIFICANT CHANGE UP (ref 0–1.5)
INR BLD: 1.22 — HIGH (ref 0.88–1.17)
INR BLD: 1.22 — HIGH (ref 0.88–1.17)
LYMPHOCYTES # BLD AUTO: 58.3 % — HIGH (ref 13–44)
LYMPHOCYTES # BLD AUTO: 6.02 K/UL — HIGH (ref 1–3.3)
MCHC RBC-ENTMCNC: 28.1 PG — SIGNIFICANT CHANGE UP (ref 27–34)
MCHC RBC-ENTMCNC: 31.9 % — LOW (ref 32–36)
MCV RBC AUTO: 87.9 FL — SIGNIFICANT CHANGE UP (ref 80–100)
MONOCYTES # BLD AUTO: 0.71 K/UL — SIGNIFICANT CHANGE UP (ref 0–0.9)
MONOCYTES NFR BLD AUTO: 6.9 % — SIGNIFICANT CHANGE UP (ref 2–14)
NEUTROPHILS # BLD AUTO: 3.15 K/UL — SIGNIFICANT CHANGE UP (ref 1.8–7.4)
NEUTROPHILS NFR BLD AUTO: 30.5 % — LOW (ref 43–77)
NRBC # FLD: 0 — SIGNIFICANT CHANGE UP
PLATELET # BLD AUTO: 166 K/UL — SIGNIFICANT CHANGE UP (ref 150–400)
PMV BLD: 9.8 FL — SIGNIFICANT CHANGE UP (ref 7–13)
POTASSIUM SERPL-MCNC: 3.6 MMOL/L — SIGNIFICANT CHANGE UP (ref 3.5–5.3)
POTASSIUM SERPL-SCNC: 3.6 MMOL/L — SIGNIFICANT CHANGE UP (ref 3.5–5.3)
PROTHROM AB SERPL-ACNC: 13.6 SEC — HIGH (ref 9.8–13.1)
PROTHROM AB SERPL-ACNC: 13.6 SEC — HIGH (ref 9.8–13.1)
PROTHROMBIN TIME/NOMAL: 11 SEC — SIGNIFICANT CHANGE UP (ref 9.8–13.1)
PROTHROMBIN TIME/NOMAL: 34 SEC — SIGNIFICANT CHANGE UP (ref 27.5–37.4)
PT INHIB SC 2 HR: 12.7 SEC — SIGNIFICANT CHANGE UP (ref 9.8–13.1)
PTT INHIB SC 2 HR: 38.6 SEC — HIGH (ref 27.5–37.4)
RBC # BLD: 4.63 M/UL — SIGNIFICANT CHANGE UP (ref 4.2–5.8)
RBC # FLD: 15.4 % — HIGH (ref 10.3–14.5)
RETICS #: 55 K/UL — SIGNIFICANT CHANGE UP (ref 25–125)
RETICS/RBC NFR: 1.2 % — SIGNIFICANT CHANGE UP (ref 0.5–2.5)
SODIUM SERPL-SCNC: 137 MMOL/L — SIGNIFICANT CHANGE UP (ref 135–145)
WBC # BLD: 10.32 K/UL — SIGNIFICANT CHANGE UP (ref 3.8–10.5)
WBC # FLD AUTO: 10.32 K/UL — SIGNIFICANT CHANGE UP (ref 3.8–10.5)

## 2018-06-09 RX ADMIN — PREGABALIN 1000 MICROGRAM(S): 225 CAPSULE ORAL at 11:46

## 2018-06-09 RX ADMIN — ATORVASTATIN CALCIUM 80 MILLIGRAM(S): 80 TABLET, FILM COATED ORAL at 21:20

## 2018-06-09 RX ADMIN — Medication 2: at 11:53

## 2018-06-09 RX ADMIN — MEMANTINE HYDROCHLORIDE 10 MILLIGRAM(S): 10 TABLET ORAL at 17:01

## 2018-06-09 RX ADMIN — Medication 1: at 08:07

## 2018-06-09 RX ADMIN — Medication 1: at 16:48

## 2018-06-09 RX ADMIN — Medication 1 MILLIGRAM(S): at 11:46

## 2018-06-09 RX ADMIN — MEMANTINE HYDROCHLORIDE 10 MILLIGRAM(S): 10 TABLET ORAL at 05:59

## 2018-06-09 RX ADMIN — DONEPEZIL HYDROCHLORIDE 5 MILLIGRAM(S): 10 TABLET, FILM COATED ORAL at 21:20

## 2018-06-10 LAB
APTT BLD: 29.5 SEC — SIGNIFICANT CHANGE UP (ref 27.5–37.4)
BASOPHILS # BLD AUTO: 0.04 K/UL — SIGNIFICANT CHANGE UP (ref 0–0.2)
BASOPHILS # BLD AUTO: 0.07 K/UL — SIGNIFICANT CHANGE UP (ref 0–0.2)
BASOPHILS NFR BLD AUTO: 0.5 % — SIGNIFICANT CHANGE UP (ref 0–2)
BASOPHILS NFR BLD AUTO: 0.6 % — SIGNIFICANT CHANGE UP (ref 0–2)
BUN SERPL-MCNC: 14 MG/DL — SIGNIFICANT CHANGE UP (ref 7–23)
CALCIUM SERPL-MCNC: 9.4 MG/DL — SIGNIFICANT CHANGE UP (ref 8.4–10.5)
CHLORIDE SERPL-SCNC: 103 MMOL/L — SIGNIFICANT CHANGE UP (ref 98–107)
CO2 SERPL-SCNC: 17 MMOL/L — LOW (ref 22–31)
CREAT SERPL-MCNC: 1.07 MG/DL — SIGNIFICANT CHANGE UP (ref 0.5–1.3)
EOSINOPHIL # BLD AUTO: 0.28 K/UL — SIGNIFICANT CHANGE UP (ref 0–0.5)
EOSINOPHIL # BLD AUTO: 0.42 K/UL — SIGNIFICANT CHANGE UP (ref 0–0.5)
EOSINOPHIL NFR BLD AUTO: 3.3 % — SIGNIFICANT CHANGE UP (ref 0–6)
EOSINOPHIL NFR BLD AUTO: 4.1 % — SIGNIFICANT CHANGE UP (ref 0–6)
GLUCOSE BLDC GLUCOMTR-MCNC: 157 MG/DL — HIGH (ref 70–99)
GLUCOSE BLDC GLUCOMTR-MCNC: 170 MG/DL — HIGH (ref 70–99)
GLUCOSE BLDC GLUCOMTR-MCNC: 178 MG/DL — HIGH (ref 70–99)
GLUCOSE BLDC GLUCOMTR-MCNC: 232 MG/DL — HIGH (ref 70–99)
GLUCOSE SERPL-MCNC: 172 MG/DL — HIGH (ref 70–99)
HCT VFR BLD CALC: 29.7 % — LOW (ref 39–50)
HCT VFR BLD CALC: 43.4 % — SIGNIFICANT CHANGE UP (ref 39–50)
HGB BLD-MCNC: 13.9 G/DL — SIGNIFICANT CHANGE UP (ref 13–17)
HGB BLD-MCNC: 9.2 G/DL — LOW (ref 13–17)
IMM GRANULOCYTES # BLD AUTO: 0.03 # — SIGNIFICANT CHANGE UP
IMM GRANULOCYTES # BLD AUTO: 0.05 # — SIGNIFICANT CHANGE UP
IMM GRANULOCYTES NFR BLD AUTO: 0.4 % — SIGNIFICANT CHANGE UP (ref 0–1.5)
IMM GRANULOCYTES NFR BLD AUTO: 0.4 % — SIGNIFICANT CHANGE UP (ref 0–1.5)
INR BLD: 1.12 — SIGNIFICANT CHANGE UP (ref 0.88–1.17)
LYMPHOCYTES # BLD AUTO: 3.36 K/UL — HIGH (ref 1–3.3)
LYMPHOCYTES # BLD AUTO: 49.8 % — HIGH (ref 13–44)
LYMPHOCYTES # BLD AUTO: 61.6 % — HIGH (ref 13–44)
LYMPHOCYTES # BLD AUTO: 7.89 K/UL — HIGH (ref 1–3.3)
MCHC RBC-ENTMCNC: 27.9 PG — SIGNIFICANT CHANGE UP (ref 27–34)
MCHC RBC-ENTMCNC: 28.1 PG — SIGNIFICANT CHANGE UP (ref 27–34)
MCHC RBC-ENTMCNC: 31 % — LOW (ref 32–36)
MCHC RBC-ENTMCNC: 32 % — SIGNIFICANT CHANGE UP (ref 32–36)
MCV RBC AUTO: 87.1 FL — SIGNIFICANT CHANGE UP (ref 80–100)
MCV RBC AUTO: 90.8 FL — SIGNIFICANT CHANGE UP (ref 80–100)
MONOCYTES # BLD AUTO: 0.51 K/UL — SIGNIFICANT CHANGE UP (ref 0–0.9)
MONOCYTES # BLD AUTO: 1.01 K/UL — HIGH (ref 0–0.9)
MONOCYTES NFR BLD AUTO: 7.6 % — SIGNIFICANT CHANGE UP (ref 2–14)
MONOCYTES NFR BLD AUTO: 7.9 % — SIGNIFICANT CHANGE UP (ref 2–14)
NEUTROPHILS # BLD AUTO: 2.53 K/UL — SIGNIFICANT CHANGE UP (ref 1.8–7.4)
NEUTROPHILS # BLD AUTO: 3.37 K/UL — SIGNIFICANT CHANGE UP (ref 1.8–7.4)
NEUTROPHILS NFR BLD AUTO: 26.3 % — LOW (ref 43–77)
NEUTROPHILS NFR BLD AUTO: 37.5 % — LOW (ref 43–77)
NRBC # FLD: 0 — SIGNIFICANT CHANGE UP
NRBC # FLD: 0.03 — SIGNIFICANT CHANGE UP
PLATELET # BLD AUTO: 189 K/UL — SIGNIFICANT CHANGE UP (ref 150–400)
PLATELET # BLD AUTO: 233 K/UL — SIGNIFICANT CHANGE UP (ref 150–400)
PMV BLD: 8.9 FL — SIGNIFICANT CHANGE UP (ref 7–13)
PMV BLD: 9.1 FL — SIGNIFICANT CHANGE UP (ref 7–13)
POTASSIUM SERPL-MCNC: 4 MMOL/L — SIGNIFICANT CHANGE UP (ref 3.5–5.3)
POTASSIUM SERPL-SCNC: 4 MMOL/L — SIGNIFICANT CHANGE UP (ref 3.5–5.3)
PROTHROM AB SERPL-ACNC: 12.9 SEC — SIGNIFICANT CHANGE UP (ref 9.8–13.1)
RBC # BLD: 3.27 M/UL — LOW (ref 4.2–5.8)
RBC # BLD: 4.98 M/UL — SIGNIFICANT CHANGE UP (ref 4.2–5.8)
RBC # FLD: 15.4 % — HIGH (ref 10.3–14.5)
RBC # FLD: 15.7 % — HIGH (ref 10.3–14.5)
SODIUM SERPL-SCNC: 133 MMOL/L — LOW (ref 135–145)
WBC # BLD: 12.81 K/UL — HIGH (ref 3.8–10.5)
WBC # BLD: 6.75 K/UL — SIGNIFICANT CHANGE UP (ref 3.8–10.5)
WBC # FLD AUTO: 12.81 K/UL — HIGH (ref 3.8–10.5)
WBC # FLD AUTO: 6.75 K/UL — SIGNIFICANT CHANGE UP (ref 3.8–10.5)

## 2018-06-10 RX ORDER — LANOLIN ALCOHOL/MO/W.PET/CERES
3 CREAM (GRAM) TOPICAL AT BEDTIME
Qty: 0 | Refills: 0 | Status: DISCONTINUED | OUTPATIENT
Start: 2018-06-10 | End: 2018-06-13

## 2018-06-10 RX ADMIN — Medication 2: at 11:43

## 2018-06-10 RX ADMIN — MEMANTINE HYDROCHLORIDE 10 MILLIGRAM(S): 10 TABLET ORAL at 06:03

## 2018-06-10 RX ADMIN — MEMANTINE HYDROCHLORIDE 10 MILLIGRAM(S): 10 TABLET ORAL at 16:45

## 2018-06-10 RX ADMIN — PREGABALIN 1000 MICROGRAM(S): 225 CAPSULE ORAL at 11:34

## 2018-06-10 RX ADMIN — DONEPEZIL HYDROCHLORIDE 5 MILLIGRAM(S): 10 TABLET, FILM COATED ORAL at 22:13

## 2018-06-10 RX ADMIN — ATORVASTATIN CALCIUM 80 MILLIGRAM(S): 80 TABLET, FILM COATED ORAL at 22:13

## 2018-06-10 RX ADMIN — Medication 100 MILLIGRAM(S): at 06:03

## 2018-06-10 RX ADMIN — Medication 1: at 16:45

## 2018-06-10 RX ADMIN — Medication 1 MILLIGRAM(S): at 11:35

## 2018-06-10 RX ADMIN — Medication 1: at 07:52

## 2018-06-11 ENCOUNTER — RESULT REVIEW (OUTPATIENT)
Age: 83
End: 2018-06-11

## 2018-06-11 LAB
ANISOCYTOSIS BLD QL: SLIGHT — SIGNIFICANT CHANGE UP
APTT BLD: 31.2 SEC — SIGNIFICANT CHANGE UP (ref 27.5–37.4)
BASOPHILS # BLD AUTO: 0.05 K/UL — SIGNIFICANT CHANGE UP (ref 0–0.2)
BASOPHILS NFR BLD AUTO: 0.5 % — SIGNIFICANT CHANGE UP (ref 0–2)
BASOPHILS NFR SPEC: 0.9 % — SIGNIFICANT CHANGE UP (ref 0–2)
BUN SERPL-MCNC: 15 MG/DL — SIGNIFICANT CHANGE UP (ref 7–23)
CALCIUM SERPL-MCNC: 9.8 MG/DL — SIGNIFICANT CHANGE UP (ref 8.4–10.5)
CHLORIDE SERPL-SCNC: 105 MMOL/L — SIGNIFICANT CHANGE UP (ref 98–107)
CO2 SERPL-SCNC: 22 MMOL/L — SIGNIFICANT CHANGE UP (ref 22–31)
CREAT SERPL-MCNC: 1.11 MG/DL — SIGNIFICANT CHANGE UP (ref 0.5–1.3)
EOSINOPHIL # BLD AUTO: 0.25 K/UL — SIGNIFICANT CHANGE UP (ref 0–0.5)
EOSINOPHIL NFR BLD AUTO: 2.7 % — SIGNIFICANT CHANGE UP (ref 0–6)
EOSINOPHIL NFR FLD: 5.5 % — SIGNIFICANT CHANGE UP (ref 0–6)
GIANT PLATELETS BLD QL SMEAR: PRESENT — SIGNIFICANT CHANGE UP
GLUCOSE BLDC GLUCOMTR-MCNC: 177 MG/DL — HIGH (ref 70–99)
GLUCOSE BLDC GLUCOMTR-MCNC: 181 MG/DL — HIGH (ref 70–99)
GLUCOSE BLDC GLUCOMTR-MCNC: 248 MG/DL — HIGH (ref 70–99)
GLUCOSE SERPL-MCNC: 171 MG/DL — HIGH (ref 70–99)
HCT VFR BLD CALC: 44 % — SIGNIFICANT CHANGE UP (ref 39–50)
HGB BLD-MCNC: 14.5 G/DL — SIGNIFICANT CHANGE UP (ref 13–17)
IMM GRANULOCYTES # BLD AUTO: 0.04 # — SIGNIFICANT CHANGE UP
IMM GRANULOCYTES NFR BLD AUTO: 0.4 % — SIGNIFICANT CHANGE UP (ref 0–1.5)
INR BLD: 1.17 — SIGNIFICANT CHANGE UP (ref 0.88–1.17)
LYMPHOCYTES # BLD AUTO: 5.45 K/UL — HIGH (ref 1–3.3)
LYMPHOCYTES # BLD AUTO: 58.8 % — HIGH (ref 13–44)
LYMPHOCYTES NFR SPEC AUTO: 38.5 % — SIGNIFICANT CHANGE UP (ref 13–44)
MACROCYTES BLD QL: SLIGHT — SIGNIFICANT CHANGE UP
MCHC RBC-ENTMCNC: 27.8 PG — SIGNIFICANT CHANGE UP (ref 27–34)
MCHC RBC-ENTMCNC: 33 % — SIGNIFICANT CHANGE UP (ref 32–36)
MCV RBC AUTO: 84.5 FL — SIGNIFICANT CHANGE UP (ref 80–100)
MONOCYTES # BLD AUTO: 0.68 K/UL — SIGNIFICANT CHANGE UP (ref 0–0.9)
MONOCYTES NFR BLD AUTO: 7.3 % — SIGNIFICANT CHANGE UP (ref 2–14)
MONOCYTES NFR BLD: 3.7 % — SIGNIFICANT CHANGE UP (ref 2–9)
NEUTROPHIL AB SER-ACNC: 43.1 % — SIGNIFICANT CHANGE UP (ref 43–77)
NEUTROPHILS # BLD AUTO: 2.8 K/UL — SIGNIFICANT CHANGE UP (ref 1.8–7.4)
NEUTROPHILS NFR BLD AUTO: 30.3 % — LOW (ref 43–77)
NRBC # FLD: 0 — SIGNIFICANT CHANGE UP
PLATELET # BLD AUTO: 185 K/UL — SIGNIFICANT CHANGE UP (ref 150–400)
PLATELET COUNT - ESTIMATE: NORMAL — SIGNIFICANT CHANGE UP
PMV BLD: 9.3 FL — SIGNIFICANT CHANGE UP (ref 7–13)
POTASSIUM SERPL-MCNC: 4.5 MMOL/L — SIGNIFICANT CHANGE UP (ref 3.5–5.3)
POTASSIUM SERPL-SCNC: 4.5 MMOL/L — SIGNIFICANT CHANGE UP (ref 3.5–5.3)
PROTHROM AB SERPL-ACNC: 13 SEC — SIGNIFICANT CHANGE UP (ref 9.8–13.1)
RBC # BLD: 5.21 M/UL — SIGNIFICANT CHANGE UP (ref 4.2–5.8)
RBC # FLD: 15.7 % — HIGH (ref 10.3–14.5)
SMUDGE CELLS # BLD: PRESENT — SIGNIFICANT CHANGE UP
SODIUM SERPL-SCNC: 138 MMOL/L — SIGNIFICANT CHANGE UP (ref 135–145)
VARIANT LYMPHS # BLD: 8.3 % — SIGNIFICANT CHANGE UP
WBC # BLD: 9.27 K/UL — SIGNIFICANT CHANGE UP (ref 3.8–10.5)
WBC # FLD AUTO: 9.27 K/UL — SIGNIFICANT CHANGE UP (ref 3.8–10.5)

## 2018-06-11 PROCEDURE — 88360 TUMOR IMMUNOHISTOCHEM/MANUAL: CPT | Mod: 26

## 2018-06-11 PROCEDURE — 88189 FLOWCYTOMETRY/READ 16 & >: CPT

## 2018-06-11 PROCEDURE — 88364 INSITU HYBRIDIZATION (FISH): CPT | Mod: 26

## 2018-06-11 PROCEDURE — 88342 IMHCHEM/IMCYTCHM 1ST ANTB: CPT | Mod: 26,59

## 2018-06-11 PROCEDURE — 88305 TISSUE EXAM BY PATHOLOGIST: CPT | Mod: 26

## 2018-06-11 PROCEDURE — 20225 BONE BIOPSY TROCAR/NDL DEEP: CPT | Mod: GC

## 2018-06-11 PROCEDURE — 88173 CYTOPATH EVAL FNA REPORT: CPT | Mod: 26

## 2018-06-11 PROCEDURE — 88365 INSITU HYBRIDIZATION (FISH): CPT | Mod: 26,59

## 2018-06-11 PROCEDURE — 77012 CT SCAN FOR NEEDLE BIOPSY: CPT | Mod: 26

## 2018-06-11 PROCEDURE — 88367 INSITU HYBRIDIZATION AUTO: CPT | Mod: 26

## 2018-06-11 PROCEDURE — 88313 SPECIAL STAINS GROUP 2: CPT | Mod: 26

## 2018-06-11 PROCEDURE — 88341 IMHCHEM/IMCYTCHM EA ADD ANTB: CPT | Mod: 26,59

## 2018-06-11 RX ADMIN — MEMANTINE HYDROCHLORIDE 10 MILLIGRAM(S): 10 TABLET ORAL at 05:37

## 2018-06-11 RX ADMIN — SODIUM CHLORIDE 50 MILLILITER(S): 9 INJECTION, SOLUTION INTRAVENOUS at 21:46

## 2018-06-11 RX ADMIN — SODIUM CHLORIDE 50 MILLILITER(S): 9 INJECTION, SOLUTION INTRAVENOUS at 11:55

## 2018-06-11 RX ADMIN — Medication 100 MILLIGRAM(S): at 05:37

## 2018-06-11 RX ADMIN — Medication 3 MILLIGRAM(S): at 21:46

## 2018-06-11 RX ADMIN — Medication 1: at 08:23

## 2018-06-11 RX ADMIN — Medication 1 MILLIGRAM(S): at 17:10

## 2018-06-11 RX ADMIN — DONEPEZIL HYDROCHLORIDE 5 MILLIGRAM(S): 10 TABLET, FILM COATED ORAL at 21:46

## 2018-06-11 RX ADMIN — Medication 2: at 17:10

## 2018-06-11 RX ADMIN — MEMANTINE HYDROCHLORIDE 10 MILLIGRAM(S): 10 TABLET ORAL at 17:10

## 2018-06-11 RX ADMIN — PREGABALIN 1000 MICROGRAM(S): 225 CAPSULE ORAL at 17:10

## 2018-06-11 RX ADMIN — ATORVASTATIN CALCIUM 80 MILLIGRAM(S): 80 TABLET, FILM COATED ORAL at 21:46

## 2018-06-11 NOTE — CONSULT NOTE ADULT - SUBJECTIVE AND OBJECTIVE BOX
Emir Cox MD  Interventional Cardiology / Advance Heart Failure and Cardiac Transplant Specialist  [place] Office : [address] [place] [address] [place] [address]  Tel:   [place] Office : [address] [facility] [place] [address]  Tel: [phone]  Cell : [phone]    HISTORY OF PRESENT ILLNESS:  89 yo M h/o HTN, DM, CKDIII, CAD s/p CABG, SDH, dementia brought by family for back pain and unsteady gait. Unable to obtain history from pt due to dementia. Per son at bedside, pt fell in the bathroom three weeks ago. Fall unwitnessed but heard by family who responded immediately and found pt on the floor. There was no LOC, unclear if any head trauma.  Since that fall pt has been complaining of back pain and has had rapid decline in ability to ambulate. Prior to fall pt ambulated independently using cane but now needs assistance from family. Also unable to tolerate sitting and prefers to be laying in bed. Family has also noted that he appears weak, appetite has been poor and he has to be forced to eat. He has had about 30lb weight loss in the past 3 months. Family has not noted significant cognitive decline. At baseline pt usually AAOx1 sometimes recognizes place and recognizes family members, able to hold a conversation and make his needs known. However, he needs assistance with all ADLs, including eating and bathing. found to have T7 and T12 compression fracture causing moderate spinal canal stenosis, abnormal bone marrow signal s/p biopsy to r/o metastasis or infiltrative disease, no cp no SOB    PAST MEDICAL & SURGICAL HISTORY:  Hep C w/o coma, chronic  Tubular adenoma of colon  Gastritis: + h pylori  DM (diabetes mellitus)  GERD (gastroesophageal reflux disease)  HTN (hypertension)  History of subdural hematoma (post traumatic)  S/P CABG x 3  No Past Surgical History    	    MEDICATIONS:  metoprolol succinate  milliGRAM(s) Oral daily        acetaminophen   Tablet. 650 milliGRAM(s) Oral every 6 hours PRN  donepezil 5 milliGRAM(s) Oral at bedtime  melatonin 3 milliGRAM(s) Oral at bedtime PRN  memantine 10 milliGRAM(s) Oral two times a day      atorvastatin 80 milliGRAM(s) Oral at bedtime  dextrose 40% Gel 15 Gram(s) Oral once PRN  dextrose 50% Injectable 12.5 Gram(s) IV Push once  dextrose 50% Injectable 25 Gram(s) IV Push once  dextrose 50% Injectable 25 Gram(s) IV Push once  glucagon  Injectable 1 milliGRAM(s) IntraMuscular once PRN  insulin lispro (HumaLOG) corrective regimen sliding scale   SubCutaneous three times a day before meals  insulin lispro (HumaLOG) corrective regimen sliding scale   SubCutaneous at bedtime    cyanocobalamin 1000 MICROGram(s) Oral daily  dextrose 5% + sodium chloride 0.9%. 1000 milliLiter(s) IV Continuous <Continuous>  dextrose 5%. 1000 milliLiter(s) IV Continuous <Continuous>  folic acid 1 milliGRAM(s) Oral daily      FAMILY HISTORY:  No pertinent family history in first degree relatives        Allergies    No Known Allergies    Intolerances    	      PHYSICAL EXAM:  T(C): 36.3 (06-11-18 @ 12:20), Max: 36.3 (06-11-18 @ 05:36)  HR: 60 (06-11-18 @ 12:20) (60 - 64)  BP: 124/52 (06-11-18 @ 12:20) (124/52 - 128/55)  RR: 18 (06-11-18 @ 12:20) (18 - 18)  SpO2: 100% (06-11-18 @ 12:20) (96% - 100%)  Wt(kg): --  I&O's Summary      Appearance: Normal	  HEENT:   Normal oral mucosa, PERRL, EOMI	  Cardiovascular: Normal S1 S2, No JVD, No murmurs, No edema  Respiratory: Lungs clear to auscultation	  Gastrointestinal:  Soft, Non-tender, + BS	  Extremities: Normal range of motion, No clubbing, cyanosis or edema    LABS:	 	    CARDIAC MARKERS:                                  14.5   9.27  )-----------( 185      ( 11 Jun 2018 06:12 )             44.0     06-11    138  |  105  |  15  ----------------------------<  171<H>  4.5   |  22  |  1.11    Ca    9.8      11 Jun 2018 06:12      proBNP:   Lipid Profile:   HgA1c:   TSH:     ASSESSMENT/PLAN:

## 2018-06-11 NOTE — CONSULT NOTE ADULT - ASSESSMENT
EKG - sinus rachana @ 81 bpm T wave inversion v4 - v6 1 2  Echo 2012 - Normal LV function     a/p     1) s/p fall - possible syncope, would get 2d echo to access lV function.    2) T7 compression fracture - s /p biopsy     3) CAD s/p CABG * 3 in 2012 - ASA when ok with IR s/p biopsy today, cont toprol consider statin

## 2018-06-12 ENCOUNTER — TRANSCRIPTION ENCOUNTER (OUTPATIENT)
Age: 83
End: 2018-06-12

## 2018-06-12 LAB
BUN SERPL-MCNC: 12 MG/DL — SIGNIFICANT CHANGE UP (ref 7–23)
CALCIUM SERPL-MCNC: 9.3 MG/DL — SIGNIFICANT CHANGE UP (ref 8.4–10.5)
CHLORIDE SERPL-SCNC: 106 MMOL/L — SIGNIFICANT CHANGE UP (ref 98–107)
CO2 SERPL-SCNC: 21 MMOL/L — LOW (ref 22–31)
CREAT SERPL-MCNC: 1.09 MG/DL — SIGNIFICANT CHANGE UP (ref 0.5–1.3)
GLUCOSE BLDC GLUCOMTR-MCNC: 169 MG/DL — HIGH (ref 70–99)
GLUCOSE BLDC GLUCOMTR-MCNC: 171 MG/DL — HIGH (ref 70–99)
GLUCOSE BLDC GLUCOMTR-MCNC: 189 MG/DL — HIGH (ref 70–99)
GLUCOSE BLDC GLUCOMTR-MCNC: 222 MG/DL — HIGH (ref 70–99)
GLUCOSE SERPL-MCNC: 178 MG/DL — HIGH (ref 70–99)
HCT VFR BLD CALC: 39.5 % — SIGNIFICANT CHANGE UP (ref 39–50)
HGB BLD-MCNC: 12.9 G/DL — LOW (ref 13–17)
MCHC RBC-ENTMCNC: 27.2 PG — SIGNIFICANT CHANGE UP (ref 27–34)
MCHC RBC-ENTMCNC: 32.7 % — SIGNIFICANT CHANGE UP (ref 32–36)
MCV RBC AUTO: 83.3 FL — SIGNIFICANT CHANGE UP (ref 80–100)
NRBC # FLD: 0.02 — SIGNIFICANT CHANGE UP
PLATELET # BLD AUTO: 192 K/UL — SIGNIFICANT CHANGE UP (ref 150–400)
PMV BLD: 9.4 FL — SIGNIFICANT CHANGE UP (ref 7–13)
POTASSIUM SERPL-MCNC: 4.2 MMOL/L — SIGNIFICANT CHANGE UP (ref 3.5–5.3)
POTASSIUM SERPL-SCNC: 4.2 MMOL/L — SIGNIFICANT CHANGE UP (ref 3.5–5.3)
RBC # BLD: 4.74 M/UL — SIGNIFICANT CHANGE UP (ref 4.2–5.8)
RBC # FLD: 15.4 % — HIGH (ref 10.3–14.5)
SODIUM SERPL-SCNC: 137 MMOL/L — SIGNIFICANT CHANGE UP (ref 135–145)
WBC # BLD: 8.52 K/UL — SIGNIFICANT CHANGE UP (ref 3.8–10.5)
WBC # FLD AUTO: 8.52 K/UL — SIGNIFICANT CHANGE UP (ref 3.8–10.5)

## 2018-06-12 RX ORDER — ACETAMINOPHEN 500 MG
2 TABLET ORAL
Qty: 0 | Refills: 0 | COMMUNITY
Start: 2018-06-12

## 2018-06-12 RX ORDER — ASPIRIN/CALCIUM CARB/MAGNESIUM 324 MG
81 TABLET ORAL DAILY
Qty: 0 | Refills: 0 | Status: DISCONTINUED | OUTPATIENT
Start: 2018-06-12 | End: 2018-06-13

## 2018-06-12 RX ORDER — SODIUM CHLORIDE 9 MG/ML
500 INJECTION INTRAMUSCULAR; INTRAVENOUS; SUBCUTANEOUS ONCE
Qty: 0 | Refills: 0 | Status: COMPLETED | OUTPATIENT
Start: 2018-06-12 | End: 2018-06-12

## 2018-06-12 RX ADMIN — MEMANTINE HYDROCHLORIDE 10 MILLIGRAM(S): 10 TABLET ORAL at 17:25

## 2018-06-12 RX ADMIN — Medication 1 MILLIGRAM(S): at 12:40

## 2018-06-12 RX ADMIN — ATORVASTATIN CALCIUM 80 MILLIGRAM(S): 80 TABLET, FILM COATED ORAL at 21:25

## 2018-06-12 RX ADMIN — Medication 100 MILLIGRAM(S): at 05:06

## 2018-06-12 RX ADMIN — Medication 1: at 08:38

## 2018-06-12 RX ADMIN — SODIUM CHLORIDE 1000 MILLILITER(S): 9 INJECTION INTRAMUSCULAR; INTRAVENOUS; SUBCUTANEOUS at 14:35

## 2018-06-12 RX ADMIN — Medication 3 MILLIGRAM(S): at 21:25

## 2018-06-12 RX ADMIN — PREGABALIN 1000 MICROGRAM(S): 225 CAPSULE ORAL at 12:41

## 2018-06-12 RX ADMIN — DONEPEZIL HYDROCHLORIDE 5 MILLIGRAM(S): 10 TABLET, FILM COATED ORAL at 21:25

## 2018-06-12 RX ADMIN — Medication 1: at 12:40

## 2018-06-12 RX ADMIN — Medication 1: at 16:51

## 2018-06-12 RX ADMIN — MEMANTINE HYDROCHLORIDE 10 MILLIGRAM(S): 10 TABLET ORAL at 05:10

## 2018-06-12 RX ADMIN — Medication 81 MILLIGRAM(S): at 17:25

## 2018-06-12 RX ADMIN — SODIUM CHLORIDE 50 MILLILITER(S): 9 INJECTION, SOLUTION INTRAVENOUS at 05:06

## 2018-06-12 NOTE — PROGRESS NOTE ADULT - PROBLEM SELECTOR PLAN 4
- Hold oral meds   - Start sliding scale insulin   - Check HbA1C
- iss

## 2018-06-12 NOTE — DISCHARGE NOTE ADULT - PATIENT PORTAL LINK FT
You can access the [facility] Patient Portal, offered by [facility], by registering with the following website: [email or web address]

## 2018-06-12 NOTE — DISCHARGE NOTE ADULT - CARE PROVIDERS DIRECT ADDRESSES
[email or web address],DirectAddress_Unknown,DirectAddress_Unknown

## 2018-06-12 NOTE — PROGRESS NOTE ADULT - PROBLEM SELECTOR PLAN 5
- continue ASA and atorvastatin  - No ischemic changes on EKG

## 2018-06-12 NOTE — PROGRESS NOTE ADULT - PROBLEM SELECTOR PROBLEM 5
Coronary artery disease involving native heart without angina pectoris, unspecified vessel or lesion type

## 2018-06-12 NOTE — DISCHARGE NOTE ADULT - COMMUNITY RESOURCES
ANTOINE 7 hours/7 days from [facility] [phone], GARY Will. Maria Teresa from [facility] [phone].

## 2018-06-12 NOTE — PROGRESS NOTE ADULT - PROBLEM SELECTOR PROBLEM 6
Alzheimer's dementia without behavioral disturbance, unspecified timing of dementia onset

## 2018-06-12 NOTE — PROGRESS NOTE ADULT - PROBLEM SELECTOR PROBLEM 4
Type 2 diabetes mellitus with stage 3 chronic kidney disease, without long-term current use of insulin

## 2018-06-12 NOTE — CHART NOTE - NSCHARTNOTEFT_GEN_A_CORE
D/w onc fellow regarding Bone Biopsy results- Emailed Oncology for a follow up appt - Pt was scheduled for discharge today with home care- D/c IVF in AM- Pt was hypotensive asymptomatic - BP- 96/50- Pt denies dizziness/ CP -   Pt with very poor PO intake - WIll recheck post IVF   Will hold discharge today

## 2018-06-12 NOTE — PROGRESS NOTE ADULT - PROBLEM SELECTOR PLAN 6
- Continue donepezil  - Fall precautions

## 2018-06-12 NOTE — DISCHARGE NOTE ADULT - CARE PLAN
Principal Discharge DX:	Ataxic gait  Secondary Diagnosis:	Lytic bone lesions on xray  Secondary Diagnosis:	Back pain  Secondary Diagnosis:	Alzheimer's dementia without behavioral disturbance, unspecified timing of dementia onset  Secondary Diagnosis:	Failure to thrive syndrome, adult Principal Discharge DX:	Ataxic gait  Goal:	Pt to wear TSLO brace when ambulating  Assessment and plan of treatment:	Follow up with Home PT - Wear TSLO brace  Secondary Diagnosis:	Lytic bone lesions on xray  Assessment and plan of treatment:	You will need to follow up with Oncology at [facility] in 1-2 weeks- Call  to make an appointment  Secondary Diagnosis:	Back pain  Secondary Diagnosis:	Alzheimer's dementia without behavioral disturbance, unspecified timing of dementia onset  Secondary Diagnosis:	Failure to thrive syndrome, adult Principal Discharge DX:	Ataxic gait  Goal:	Pt to wear TSLO brace when ambulating  Assessment and plan of treatment:	Follow up with Home PT - Wear TSLO brace.  Secondary Diagnosis:	Lytic bone lesions on xray  Assessment and plan of treatment:	You will need to follow up with Oncology at [facility] in 1-2 weeks- Call  to make an appointment.  Follow up with hematology for biopsy results.  Secondary Diagnosis:	Alzheimer's dementia without behavioral disturbance, unspecified timing of dementia onset  Assessment and plan of treatment:	Supportive care.  Continue all medications.  Secondary Diagnosis:	Failure to thrive syndrome, adult  Assessment and plan of treatment:	Diet as tolerated. Principal Discharge DX:	Ataxic gait  Goal:	Pt to wear TSLO brace when ambulating  Assessment and plan of treatment:	Follow up with Home PT - Wear TSLO brace.  Secondary Diagnosis:	Lytic bone lesions on xray  Assessment and plan of treatment:	You will need to follow up with Oncology at [facility] in 1-2 weeks- Call  to make an appointment.  Follow up with hematology for biopsy results.  Secondary Diagnosis:	Alzheimer's dementia without behavioral disturbance, unspecified timing of dementia onset  Assessment and plan of treatment:	Supportive care.  Continue all medications.  Secondary Diagnosis:	Failure to thrive syndrome, adult  Assessment and plan of treatment:	Diet as tolerated.  Secondary Diagnosis:	Essential hypertension  Assessment and plan of treatment:	Blood pressure was low - metoprolol discontinued.  Heart rate is stable and controlled.  Please follow up with your PCP within 2 weeks of discharge for blood pressure recheck and to see if medications can/should be restarted.

## 2018-06-12 NOTE — DISCHARGE NOTE ADULT - HOSPITAL COURSE
89 yo M h/o HTN, DM, CKDIII, CAD s/p CABG, SDH, dementia brought by family for back pain and unsteady gait after fall at home.        S/P  fall  T7 compression fx   X ray showing likely T7 compression fracture.  MRI L spine- with T7 & T 12 acute pathological fracture and lytic leision   Ortho spine c/s- WBAT  CT Scan -  Diffuse lytic osseous lesions which may represent metastatic disease versus multiple myeloma. Pathologic compression fractures of T7 and T12. Large right inguinal hernia containing fat, vessels, most of the ascending colon and hepatic flexure. Few tree-in-bud opacities in the right upper and lower lobes   -R/o MM vs. MGUS-- s/p bone marrow bx w/ IR on 6/11, f/u results       L 5 lytic leision   Oncology c/s- Biopsy of L5 leision / CT A/P/C  IR c/s for biopsy   House Onc on board- rec SPEP/ UPEP  Pt will need to follow up with Onc as out pt after the results of Bx obtd     Acute kidney injury superimposed on chronic kidney disease.   Likely prerenal in setting of poor PO intake  - Continue metoprolol with hold parameters   Renal us- neg for hydronephrosis    Essential hypertension.  - Monitor BP  - Continue metoprolol with hold parameters. - Hold oral meds     Alzheimer's dementia without behavioral disturbance, unspecified timing of dementia onset  - Continue donepezil  - Fall precautions.    Dispo Home with Home care

## 2018-06-12 NOTE — DISCHARGE NOTE ADULT - MEDICATION SUMMARY - MEDICATIONS TO STOP TAKING
I will STOP taking the medications listed below when I get home from the hospital:  None I will STOP taking the medications listed below when I get home from the hospital:    metoprolol succinate 100 mg oral tablet, extended release  -- 1 tab(s) by mouth once a day

## 2018-06-12 NOTE — DISCHARGE NOTE ADULT - CARE PROVIDER_API CALL
Foreign Jeronimo), Hematology; HospicePalliative Medicine; Internal Medicine; Medical Oncology  [address] [place] [address]  Phone: [phone]  Fax: [phone]    Russ Espinoza (GAUDENCIO), Internal Medicine; Nephrology  [address]  [address]nd [place] [address]  Phone: [phone]  Fax: [phone]    Yazan Pena), Internal Medicine  [address] [place] [place] [address]  Phone: [phone]  Fax: [phone]

## 2018-06-12 NOTE — DISCHARGE NOTE ADULT - PLAN OF CARE
Pt to wear TSLO brace when ambulating Follow up with Home PT - Wear TSLO brace You will need to follow up with Oncology at [facility] in 1-2 weeks- Call  to make an appointment Follow up with Home PT - Wear TSLO brace. You will need to follow up with Oncology at [facility] in 1-2 weeks- Call  to make an appointment.  Follow up with hematology for biopsy results. Supportive care.  Continue all medications. Diet as tolerated. Blood pressure was low - metoprolol discontinued.  Heart rate is stable and controlled.  Please follow up with your PCP within 2 weeks of discharge for blood pressure recheck and to see if medications can/should be restarted.

## 2018-06-12 NOTE — DISCHARGE NOTE ADULT - SECONDARY DIAGNOSIS.
Lytic bone lesions on xray Back pain Alzheimer's dementia without behavioral disturbance, unspecified timing of dementia onset Failure to thrive syndrome, adult Essential hypertension

## 2018-06-12 NOTE — DISCHARGE NOTE ADULT - MEDICATION SUMMARY - MEDICATIONS TO TAKE
I will START or STAY ON the medications listed below when I get home from the hospital:    aspirin 81 mg oral tablet  -- 1 tab(s) by mouth once a day  -- Indication: For CAD    acetaminophen 325 mg oral tablet  -- 2 tab(s) by mouth every 6 hours, As needed, back pain  -- Indication: For Pain    repaglinide 0.5 mg oral tablet  -- 1 tab(s) by mouth 3 times a day (before meals)  -- Indication: For Type 2 diabetes mellitus with stage 3 chronic kidney disease, without long-term current use of insulin    Januvia 50 mg oral tablet  -- 1 tab(s) by mouth once a day  -- Indication: For Type 2 diabetes mellitus with stage 3 chronic kidney disease, without long-term current use of insulin    atorvastatin 80 mg oral tablet  -- 1 tab(s) by mouth once a day  -- Indication: For HLD    metoprolol succinate 100 mg oral tablet, extended release  -- 1 tab(s) by mouth once a day  -- Indication: For HTN    donepezil 5 mg oral tablet  -- 1 tab(s) by mouth once a day (at bedtime)  -- Indication: For Alzheimer's dementia without behavioral disturbance, unspecified timing of dementia onset    Vitamin B12 1000 mcg oral tablet  -- 1 tab(s) by mouth once a day  -- Indication: For Alzheimer's dementia without behavioral disturbance, unspecified timing of dementia onset    folic acid 1 mg oral tablet  -- 1 tab(s) by mouth once a day  -- Indication: For Alzheimer's dementia without behavioral disturbance, unspecified timing of dementia onset I will START or STAY ON the medications listed below when I get home from the hospital:    aspirin 81 mg oral tablet  -- 1 tab(s) by mouth once a day  -- Indication: For CAD    acetaminophen 325 mg oral tablet  -- 2 tab(s) by mouth every 6 hours, As needed, back pain  -- Indication: For Pain    repaglinide 0.5 mg oral tablet  -- 1 tab(s) by mouth 3 times a day (before meals)  -- Indication: For Type 2 diabetes mellitus with stage 3 chronic kidney disease, without long-term current use of insulin    Januvia 50 mg oral tablet  -- 1 tab(s) by mouth once a day  -- Indication: For Type 2 diabetes mellitus with stage 3 chronic kidney disease, without long-term current use of insulin    atorvastatin 80 mg oral tablet  -- 1 tab(s) by mouth once a day  -- Indication: For HLD    donepezil 5 mg oral tablet  -- 1 tab(s) by mouth once a day (at bedtime)  -- Indication: For Alzheimer's dementia without behavioral disturbance, unspecified timing of dementia onset    Vitamin B12 1000 mcg oral tablet  -- 1 tab(s) by mouth once a day  -- Indication: For Alzheimer's dementia without behavioral disturbance, unspecified timing of dementia onset    folic acid 1 mg oral tablet  -- 1 tab(s) by mouth once a day  -- Indication: For Alzheimer's dementia without behavioral disturbance, unspecified timing of dementia onset

## 2018-06-13 VITALS
RESPIRATION RATE: 18 BRPM | SYSTOLIC BLOOD PRESSURE: 113 MMHG | HEART RATE: 63 BPM | TEMPERATURE: 98 F | OXYGEN SATURATION: 100 % | DIASTOLIC BLOOD PRESSURE: 53 MMHG

## 2018-06-13 LAB — GLUCOSE BLDC GLUCOMTR-MCNC: 143 MG/DL — HIGH (ref 70–99)

## 2018-06-13 RX ORDER — METOPROLOL TARTRATE 50 MG
1 TABLET ORAL
Qty: 0 | Refills: 0 | COMMUNITY

## 2018-06-13 RX ADMIN — MEMANTINE HYDROCHLORIDE 10 MILLIGRAM(S): 10 TABLET ORAL at 06:41

## 2018-06-13 NOTE — PROGRESS NOTE ADULT - PROBLEM SELECTOR PLAN 2
- Likely prerenal in setting of poor PO intake  - improving with ivf
- Likely prerenal in setting of poor PO intake  - improving with ivf
baseline Scr was 1.5, but its better than baseline now, likely sec to lost muscle mass  PTH very low suggested hypoparathyroidism, ca wnl. monitor  has proteinuria vasculitis w/u was done with positive SIF, K/L:6.25 which is elevated off ACE/ARB sec to hyperkalemia.   urine p/c 148mg/day  K/L high 6.25, SPEP with faint band, pending  sif.   heme on board, pending biopsy
- Likely prerenal in setting of poor PO intake  - improving with ivf
No obstructive uropathy based on renal US.   ERIKA improving, likely pre-renal due to decreased PO intake.   Underlying CKD may be related to MM.
baseline ~1.5   PTH very low suggested hypoparathyroidism, ca wnl. monitor  has proteinuria vasculitis w/u was done with positive SIF. off ACE/ARB sec to hyperkalemia.   urine p/c 148mg/day  K/L high 6.2, SPEP with faint band, pending  sif.   heme on board, pending biopsy
- Likely prerenal in setting of poor PO intake  - improving with ivf
baseline Scr was 1.5, but its better than baseline now, likely sec to lost muscle mass  PTH very low suggested hypoparathyroidism, ca wnl. monitor  has proteinuria vasculitis w/u was done with positive SIF, K/L:6.25 which is elevated off ACE/ARB sec to hyperkalemia.   urine p/c 148mg/day  K/L high 6.25, SPEP with faint band, +sif.   heme on board.   S/P biopsy. Noted patient comfortable
baseline Scr was 1.5, but its better than baseline now, likely sec to lost muscle mass  PTH very low suggested hypoparathyroidism, ca wnl. monitor  has proteinuria vasculitis w/u was done with positive SIF, K/L:6.25 which is elevated off ACE/ARB sec to hyperkalemia.   urine p/c 148mg/day  K/L high 6.25, SPEP with faint band, pending  sif.   heme on board, pending biopsy
baseline Scr was 1.5, but its better than baseline now, likely sec to lost muscle mass  PTH very low suggested hypoparathyroidism, ca wnl. monitor  has proteinuria vasculitis w/u was done with positive SIF, K/L:6.25 which is elevated off ACE/ARB sec to hyperkalemia.   urine p/c 148mg/day  K/L high 6.25, SPEP with faint band, pending  sif.   heme on board.   S/P biopsy. Noted patient comfortable
baseline Scr was 1.5, but its better than baseline now, likely sec to lost muscle mass  PTH very low suggested hypoparathyroidism, ca wnl. monitor  has proteinuria vasculitis w/u was done with positive SIF. off ACE/ARB sec to hyperkalemia.   urine p/c 148mg/day  K/L high 6.2, SPEP with faint band, pending  sif.   heme on board, pending biopsy
baseline Scr was 1.5, but its better than baseline now, likely sec to lost muscle mass  PTH very low suggested hypoparathyroidism, ca wnl. monitor  has proteinuria vasculitis w/u was done with positive SIF. off ACE/ARB sec to hyperkalemia.   urine p/c 148mg/day  K/L high 6.2, SPEP with faint band, pending  sif.   heme on board, pending biopsy
baseline ~1.5   PTH very low suggested hypoparathyroidism, ca wnl. monitor  has proteinuria vasculitis w/u was done with positive SIF. off ACE/ARB sec to hyperkalemia.   urine p/c 148mg/day  K/L high 6.2, pending spep, sif.   heme on board
baseline ~1.5   PTH very low suggested hypoparathyroidism, ca wnl. monitor  has proteinuria vasculitis w/u was done with positive SIF. off ACE/ARB sec to hyperkalemia.   urine p/c 148mg/day  K/L high 6.2, pending spep, sif.   heme on board
baseline ~1.5   PTH very low suggested hypoparathyroidism, ca wnl. monitor  has proteinuria vasculitis w/u was done with positive SIF. off ACE/ARB sec to hyperkalemia.   urine p/c 148mg/day  K/L high 6.2, pending spep, sif. likely would need hematology follow up
baseline ~1.5   PTH very low suggested hypoparathyroidism, ca wnl. monitor  has proteinuria vasculitis w/u was done with positive SIF. off ACE/ARB sec to hyperkalemia.   urine p/c 148mg/day pending spep, sif, k/l.
- Likely prerenal in setting of poor PO intake  - improving with ivf

## 2018-06-13 NOTE — PROGRESS NOTE ADULT - PROBLEM SELECTOR PLAN 3
- Monitor BP  - Continue metoprolol with hold parameters
- Monitor BP  - Continue metoprolol with hold parameters
controlled monitor
- Monitor BP  - Continue metoprolol with hold parameters
controlled monitor
- Monitor BP  - Continue metoprolol with hold parameters
controlled monitor
pts bp borderline, will hold htn meds
controlled monitor
- Monitor BP  - Continue metoprolol with hold parameters

## 2018-06-13 NOTE — PROGRESS NOTE ADULT - PROBLEM SELECTOR PROBLEM 3
Essential hypertension

## 2018-06-13 NOTE — PROGRESS NOTE ADULT - PROBLEM SELECTOR PROBLEM 1
ERIKA (acute kidney injury)
Unsteady gait
ERIKA (acute kidney injury)
Lytic bone lesions on xray
ERIKA (acute kidney injury)
Unsteady gait

## 2018-06-13 NOTE — PROGRESS NOTE ADULT - PROVIDER SPECIALTY LIST ADULT
Cardiology
Cardiology
Internal Medicine
Nephrology
Heme/Onc
Internal Medicine
Internal Medicine
Nephrology
Nephrology
Internal Medicine

## 2018-06-13 NOTE — PROGRESS NOTE ADULT - ASSESSMENT
87 yo M h/o HTN, DM, CKDIII, CAD s/p CABG, SDH, dementia brought by family for back pain and unsteady gait after fall at home.
89 yo M h/o HTN, DM, CKDIII, CAD s/p CABG, SDH, dementia brought by family for back pain and unsteady gait.
Mr. De La Fuente is a 87 y/o M, with h/o dementia, CAD, HTN, with progressive cognitive decline for past year and a half and rapid functional decline over past 3 months including 30 pound weight loss, admitted after a fall and back pain and found on imaging with diffuse lytic marimar lesions.
87 yo M h/o HTN, DM, CKDIII, CAD s/p CABG, SDH, dementia brought by family for back pain and unsteady gait after fall at home.
87 yo M h/o HTN, DM, CKDIII, CAD s/p CABG, SDH, dementia brought by family for back pain and unsteady gait.
89 yo M h/o HTN, DM, CKDIII, CAD s/p CABG, SDH, dementia brought by family for back pain and unsteady gait after fall at home.
89 yo M h/o HTN, DM, CKDIII, CAD s/p CABG, SDH, dementia brought by family for back pain and unsteady gait.
EKG - sinus rachana @ 81 bpm T wave inversion v4 - v6 1 2  Echo 2012 - Normal LV function     a/p     1) s/p fall - possible syncope, would get 2d echo to access lV function, if not in pt then out pt    2) T7 compression fracture - s /p biopsy     3) CAD s/p CABG * 3 in 2012 - ASA when ok with IR s/p biopsy today, cont toprol consider statin
EKG - sinus rachana @ 81 bpm T wave inversion v4 - v6 1 2  Echo 2012 - Normal LV function     a/p     1) s/p fall - possible syncope, would get 2d echo to access lV function, if not in pt then out pt    2) T7 compression fracture - s /p biopsy     3) CAD s/p CABG * 3 in 2012 - ASA when ok with IR s/p biopsy today, cont toprol consider statin
89 yo M h/o HTN, DM, CKDIII, CAD s/p CABG, SDH, dementia brought by family for back pain and unsteady gait after fall at home.
87 yo M h/o HTN, DM, CKDIII, CAD s/p CABG, SDH, dementia brought by family for back pain and unsteady gait.
89 yo M h/o HTN, DM, CKDIII, CAD s/p CABG, SDH, dementia brought by family for back pain and unsteady gait after fall at home.
87 yo M h/o HTN, DM, CKDIII, CAD s/p CABG, SDH, dementia brought by family for back pain and unsteady gait after fall at home.

## 2018-06-13 NOTE — PROGRESS NOTE ADULT - PROBLEM SELECTOR PLAN 1
mRI - Acute/subacute pathologic compression deformities of the T7 and T12   vertebral bodies. There is bony retropulsion at the T7 level causing   moderate spinal canal stenosis without cord edema or signal abnormality.    No cord compression.    Heterogeneity of the marrow in addition, raises the possibility for an   infiltrative marrow process including metastatic disease from an unknown   primary versus changes related to multiple myeloma. Please correlate   clinically.  In particular, there is an expansile lytic lesion within the left L5   transverse process.    Moderate right L4-L5 foraminal narrowing secondary to small foraminal   disc protrusion and facet arthrosis/ligamentum flavum hypertrophy.  as per ortho , TLSO brace for comfort ordered for the patient  [facility] PT  Oncology consult input appreciated , Spep + , poss MM vs MGUS
likely prerenal. Renal function has improved  monitor
< from: MR Lumbar Spine No Cont (05.31.18 @ 22:27) >    Acute/subacute pathologic compression deformities of the T7 and T12   vertebral bodies. There is bony retropulsion at the T7 level causing   moderate spinal canal stenosis without cord edema or signal abnormality.    No cord compression.    Heterogeneity of the marrow in addition, raises the possibility for an   infiltrative marrow process including metastatic disease from an unknown   primary versus changes related to multiple myeloma. Please correlate   clinically.  In particular, there is an expansile lytic lesion within the left L5   transverse process.    Moderate right L4-L5 foraminal narrowing secondary to small foraminal   disc protrusion and facet arthrosis/ligamentum flavum hypertrophy.  as per ortho , TLSO brace for comfort ordered for the patient  [facility] PT  Oncology consult, rad/onc consult recommended
Patient's pain currently is within control.   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   Patient is planned for IR guided lytic lesion biopsy on Friday.   Patient's 3 month history of weight loss / failure to thrive is likely 2/2 to the malignant underlying process superimposed on his chronic dementia.   Discussed with son at the bedside and daughter on the phone. Currently patient is not a good candidate for full disease modifying treatments, however some palliative therapies may be considered. Introduced the idea of hospice. Daughter indicated they would like a definitive answer as to what this is prior to making that final decision. She did seem open to the idea of hospice ultimately.   Will follow up after biopsy.
likely prerenal already improving  monitor
- < from: CT Head No Cont (05.30.18 @ 19:52) >      Impression: No evidence of acute hemorrhage, mass or mass effect.    Old infarct  < from: Xray Lumbosacral Spine + Obliques (05.30.18 @ 19:41) >    Age indeterminate compression deformities of thoracic and vertebral   bodies. Further evaluation of acuity can be obtained with a dedicated MRI   if clinically indicated, assuming there are no MRI contraindications.      < end of copied text >  mRI, ORTHO eval,PT , pain control
< from: MR Lumbar Spine No Cont (05.31.18 @ 22:27) >    Acute/subacute pathologic compression deformities of the T7 and T12   vertebral bodies. There is bony retropulsion at the T7 level causing   moderate spinal canal stenosis without cord edema or signal abnormality.    No cord compression.    Heterogeneity of the marrow in addition, raises the possibility for an   infiltrative marrow process including metastatic disease from an unknown   primary versus changes related to multiple myeloma. Please correlate   clinically.  In particular, there is an expansile lytic lesion within the left L5   transverse process.    Moderate right L4-L5 foraminal narrowing secondary to small foraminal   disc protrusion and facet arthrosis/ligamentum flavum hypertrophy.  as per ortho , TLSO brace for comfort ordered for the patient  [facility] PT  Oncology consult, rad/onc consult recommended
likely prerenal already improving  monitor
likely prerenal. Renal function has improved  monitor
mRI - Acute/subacute pathologic compression deformities of the T7 and T12   vertebral bodies. There is bony retropulsion at the T7 level causing   moderate spinal canal stenosis without cord edema or signal abnormality.    No cord compression.    Heterogeneity of the marrow in addition, raises the possibility for an   infiltrative marrow process including metastatic disease from an unknown   primary versus changes related to multiple myeloma. Please correlate   clinically.  In particular, there is an expansile lytic lesion within the left L5   transverse process.    Moderate right L4-L5 foraminal narrowing secondary to small foraminal   disc protrusion and facet arthrosis/ligamentum flavum hypertrophy.  as per ortho , TLSO brace for comfort ordered for the patient  [facility] PT  Oncology consult input appreciated , Spep + , poss MM vs MGUS  poss vertebral biopsy tomorrow by IR
mRI - Acute/subacute pathologic compression deformities of the T7 and T12   vertebral bodies. There is bony retropulsion at the T7 level causing   moderate spinal canal stenosis without cord edema or signal abnormality.    No cord compression.    Heterogeneity of the marrow in addition, raises the possibility for an   infiltrative marrow process including metastatic disease from an unknown   primary versus changes related to multiple myeloma. Please correlate   clinically.  In particular, there is an expansile lytic lesion within the left L5   transverse process.    Moderate right L4-L5 foraminal narrowing secondary to small foraminal   disc protrusion and facet arthrosis/ligamentum flavum hypertrophy.  as per ortho , TLSO brace for comfort ordered for the patient  [facility] PT  Oncology consult, rad/onc consult recommended
mRI - Acute/subacute pathologic compression deformities of the T7 and T12   vertebral bodies. There is bony retropulsion at the T7 level causing   moderate spinal canal stenosis without cord edema or signal abnormality.    No cord compression.Heterogeneity of the marrow in addition, raises the possibility for an   infiltrative marrow process including metastatic disease from an unknown   primary versus changes related to multiple myeloma. Please correlate   clinically.In particular, there is an expansile lytic lesion within the left L5   transverse process.  Moderate right L4-L5 foraminal narrowing secondary to small foraminal   disc protrusion and facet arthrosis/ligamentum flavum hypertrophy.  as per ortho , TLSO brace for comfort ordered for the patient  [facility] PT  Oncology consult input appreciated , Spep + , poss MM vs MGUS  s/p vertebral body biopsy   poss dc with out pt heme f/u for path results and further treatement
mRI - Acute/subacute pathologic compression deformities of the T7 and T12   vertebral bodies. There is bony retropulsion at the T7 level causing   moderate spinal canal stenosis without cord edema or signal abnormality.    No cord compression.    Heterogeneity of the marrow in addition, raises the possibility for an   infiltrative marrow process including metastatic disease from an unknown   primary versus changes related to multiple myeloma. Please correlate   clinically.  In particular, there is an expansile lytic lesion within the left L5   transverse process.    Moderate right L4-L5 foraminal narrowing secondary to small foraminal   disc protrusion and facet arthrosis/ligamentum flavum hypertrophy.  as per ortho , TLSO brace for comfort ordered for the patient  [facility] PT  Oncology consult input appreciated , Spep + , poss MM vs MGUS  poss vertebral biopsy tomorrow by IR
mRI - Acute/subacute pathologic compression deformities of the T7 and T12   vertebral bodies. There is bony retropulsion at the T7 level causing   moderate spinal canal stenosis without cord edema or signal abnormality.    No cord compression.    Heterogeneity of the marrow in addition, raises the possibility for an   infiltrative marrow process including metastatic disease from an unknown   primary versus changes related to multiple myeloma. Please correlate   clinically.  In particular, there is an expansile lytic lesion within the left L5   transverse process.    Moderate right L4-L5 foraminal narrowing secondary to small foraminal   disc protrusion and facet arthrosis/ligamentum flavum hypertrophy.  as per ortho , TLSO brace for comfort ordered for the patient  [facility] PT  Oncology consult input appreciated , Spep + , poss MM vs MGUS  poss vertebral body biopsy today
- < from: CT Head No Cont (05.30.18 @ 19:52) >      Impression: No evidence of acute hemorrhage, mass or mass effect.    Old infarct  < from: Xray Lumbosacral Spine + Obliques (05.30.18 @ 19:41) >    Age indeterminate compression deformities of thoracic and vertebral   bodies. Further evaluation of acuity can be obtained with a dedicated MRI   if clinically indicated, assuming there are no MRI contraindications.      < end of copied text >  mRI, ORTHO eval,PT , pain control
< from: MR Lumbar Spine No Cont (05.31.18 @ 22:27) >    Acute/subacute pathologic compression deformities of the T7 and T12   vertebral bodies. There is bony retropulsion at the T7 level causing   moderate spinal canal stenosis without cord edema or signal abnormality.    No cord compression.    Heterogeneity of the marrow in addition, raises the possibility for an   infiltrative marrow process including metastatic disease from an unknown   primary versus changes related to multiple myeloma. Please correlate   clinically.  In particular, there is an expansile lytic lesion within the left L5   transverse process.    Moderate right L4-L5 foraminal narrowing secondary to small foraminal   disc protrusion and facet arthrosis/ligamentum flavum hypertrophy.  as per ortho , TLSO brace for comfort ordered for the patient  [facility] PT  Oncology consult, rad/onc consult recommended

## 2018-06-13 NOTE — CHART NOTE - NSCHARTNOTEFT_GEN_A_CORE
pt seen and examined  labs, vitals, consults reviewed   discussed management plan with pts family  35 min spent to coordinate discharge

## 2018-06-13 NOTE — PROGRESS NOTE ADULT - PROBLEM SELECTOR PROBLEM 2
Acute kidney injury superimposed on chronic kidney disease
Acute kidney injury superimposed on chronic kidney disease
Stage 3 chronic kidney disease
Acute kidney injury superimposed on chronic kidney disease
ERIKA (acute kidney injury)
Stage 3 chronic kidney disease
Acute kidney injury superimposed on chronic kidney disease
Stage 3 chronic kidney disease
Acute kidney injury superimposed on chronic kidney disease

## 2018-06-13 NOTE — PROGRESS NOTE ADULT - SUBJECTIVE AND OBJECTIVE BOX
Dr. Espinoza (Nephrology)  Office [phone]  Cell [phone]  [place] CALL  Cell [phone]      Patient is a 88y old  Male who presents with a chief complaint of Back pain, unsteady gait (30 May 2018 21:24)      Patient seen and examined at bedside. No chest pain/sob    VITALS:  T(F): 97.6 (06-07-18 @ 15:10), Max: 98.2 (06-06-18 @ 19:53)  HR: 79 (06-07-18 @ 15:10)  BP: 112/64 (06-07-18 @ 15:10)  RR: 18 (06-07-18 @ 05:14)  SpO2: 100% (06-07-18 @ 05:14)  Wt(kg): --        PHYSICAL EXAM:  Constitutional: NAD  Neck: No JVD  Respiratory: CTAB, no wheezes, rales or rhonchi  Cardiovascular: S1, S2, RRR  Gastrointestinal: BS+, soft, NT/ND  Extremities: No peripheral edema    Hospital Medications:   MEDICATIONS  (STANDING):  atorvastatin 80 milliGRAM(s) Oral at bedtime  cyanocobalamin 1000 MICROGram(s) Oral daily  dextrose 5%. 1000 milliLiter(s) (50 mL/Hr) IV Continuous <Continuous>  dextrose 50% Injectable 12.5 Gram(s) IV Push once  dextrose 50% Injectable 25 Gram(s) IV Push once  dextrose 50% Injectable 25 Gram(s) IV Push once  donepezil 5 milliGRAM(s) Oral at bedtime  folic acid 1 milliGRAM(s) Oral daily  heparin  Injectable 5000 Unit(s) SubCutaneous every 8 hours  insulin lispro (HumaLOG) corrective regimen sliding scale   SubCutaneous three times a day before meals  insulin lispro (HumaLOG) corrective regimen sliding scale   SubCutaneous at bedtime  memantine 10 milliGRAM(s) Oral two times a day  metoprolol succinate  milliGRAM(s) Oral daily      LABS:  06-07    135  |  104  |  20  ----------------------------<  132<H>  4.1   |  21<L>  |  1.22    Ca    9.2      07 Jun 2018 06:15  Phos  3.3     06-07  Mg     1.6     06-07      Creatinine Trend: 1.22 <--, 1.31 <--, 1.34 <--, 1.37 <--, 1.37 <--, 1.46 <--    Phosphorus Level, Serum: 3.3 mg/dL (06-07 @ 06:15)                              13.4   9.89  )-----------( 148      ( 07 Jun 2018 06:15 )             40.8     Urine Studies:  Urinalysis - [05-31-18 @ 22:45]      Color PLYEL / Appearance CLEAR / SG 1.010 / pH 5.5      Gluc 50 / Ketone NEGATIVE  / Bili NEGATIVE / Urobili NORMAL       Blood NEGATIVE / Protein NEGATIVE / Leuk Est NEGATIVE / Nitrite NEGATIVE      RBC 0-2 / WBC 2-5 / Hyaline  / Gran  / Sq Epi OCC / Non Sq Epi  / Bacteria     Urine Creatinine 58.09      [05-31-18 @ 22:45]  Urine Protein 16.5      [06-02-18 @ 05:50]    PTH 5.92 (Ca --)      [06-01-18 @ 06:10]   --  Vitamin D (25OH) 42.8      [06-01-18 @ 06:10]  HbA1c 7.8      [06-01-18 @ 06:10]  TSH 1.18      [06-04-18 @ 05:20]      Free Light Chains: kappa 10.43, lambda 1.67, ratio = 6.25 H      [06-02 @ 06:00]    RADIOLOGY & ADDITIONAL STUDIES:
INTERVAL HPI/OVERNIGHT EVENTS:  Patient S&E at bedside. No o/n events, patient in chair and appears comfortable.     VITAL SIGNS:  T(F): 97.4 (18 @ 13:09)  HR: 71 (18 @ 13:09)  BP: 107/64 (18 @ 13:09)  RR: 16 (18 @ 13:09)  SpO2: 99% (18 @ 13:09)  Wt(kg): --    PHYSICAL EXAM:    Constitutional: NAD, cachectic  Eyes: EOMI, sclera non-icteric  Neck: supple, no masses, no JVD  Respiratory: CTA b/l, good air entry b/l  Cardiovascular: RRR, no M/R/G  Gastrointestinal: soft, NTND, no masses palpable, + BS, no hepatosplenomegaly  Extremities: no c/c/e  Neurological: AAOx1-2, follows commands well.       MEDICATIONS  (STANDING):  atorvastatin 80 milliGRAM(s) Oral at bedtime  cyanocobalamin 1000 MICROGram(s) Oral daily  dextrose 5%. 1000 milliLiter(s) (50 mL/Hr) IV Continuous <Continuous>  dextrose 50% Injectable 12.5 Gram(s) IV Push once  dextrose 50% Injectable 25 Gram(s) IV Push once  dextrose 50% Injectable 25 Gram(s) IV Push once  donepezil 5 milliGRAM(s) Oral at bedtime  folic acid 1 milliGRAM(s) Oral daily  heparin  Injectable 5000 Unit(s) SubCutaneous every 8 hours  insulin lispro (HumaLOG) corrective regimen sliding scale   SubCutaneous three times a day before meals  insulin lispro (HumaLOG) corrective regimen sliding scale   SubCutaneous at bedtime  metoprolol succinate  milliGRAM(s) Oral daily  sodium chloride 0.9%. 1000 milliLiter(s) (75 mL/Hr) IV Continuous <Continuous>    MEDICATIONS  (PRN):  acetaminophen   Tablet. 650 milliGRAM(s) Oral every 6 hours PRN back pain  dextrose 40% Gel 15 Gram(s) Oral once PRN Blood Glucose LESS THAN 70 milliGRAM(s)/deciliter  glucagon  Injectable 1 milliGRAM(s) IntraMuscular once PRN Glucose LESS THAN 70 milligrams/deciliter      Allergies    No Known Allergies    Intolerances        LABS:                        13.2   10.41 )-----------( 128      ( 2018 05:20 )             40.7     -    136  |  103  |  24<H>  ----------------------------<  126<H>  4.0   |  20<L>  |  1.31<H>    Ca    9.1      2018 05:20    TPro  8.2  /  Alb  x   /  TBili  x   /  DBili  x   /  AST  x   /  ALT  x   /  AlkPhos  x       Immunoglobulin, Serum (18 @ 06:00)    Quantitative IgA: 173 mg/dL    Quantitative Ig mg/dL    Quantitative IgM: 33 mg/dL    Immunoglobulin Free Light Chains, Serum (18 @ 06:00)    [place] Free Light Chains, Serum: 10.43 mg/dL    Lambda Free Light Chains, Serum: 1.67 mg/dL    Kappa/Lambda Free Light Chain Ratio, Serum: 6.25 H: REF RANGE          0.26-1.65 Ratio    Special Hematology Pathology:                   URINE PROTEIN ELECTROPHORESIS      ===========================================================                      Albumin  -  PRESENT      Alpha-1 Globulin  -  PRESENT      Alpha-2 Globulin  -  PRESENT      Beta Globulins    -  PRESENT      Gamma Globulins   -  PRESENT      Total Protein     -  8.6 MG/DL      Specimen Type     -  RANDOM URINE X100 CONC          BAND SEEN IN GAMMA REGION             ASSESSMENT:        WEAK BAND IN GAMMA REGION SUGGESTIVE OF MONOCLONAL      GAMMOPATHY. SERUM AND URINE IMMUNOFIXATION ELECTROPHORESIS      ARE RECOMMENDED IF NOT PREVIOUSLY PERFORMED.      Special Hematology Pathology:   ****DIAGNOSIS****                        IMMUNOFIXATION               IGG IMMUNOFIX BAND        IGA IMMUNOFIX    POLYCLONAL        IGM IMMUNOFIX    NONE SEEN        JONAS KAPPA        BAND        JONAS LAMBDA       WEAK BAND WITHIN POLYCLONAL BACKGROUND        SPECIMEN TYPE    SERUM             INTERPRETATION OF IMMUNOFIXATION TESTING:      CORRESPONDING IGG AND KAPPA BANDS CONSISTENT WITH MONOCLONAL IGG KAPPA PROTEIN.      IN ADDITION, A WEAK LAMBDA BAND IS NOTED. CORRELATION WITH URINE IMMUNOFIXATION      ELECTROPHORESIS IS NECESSART.      Special Hematology Pathology (18 @ 09:00)   ****DIAGNOSIS****                                      SERUM PROTEIN ELECTROPHORESIS      ===========================================================                                            Reference Range         Total Protein -  8.50                6.2 - 8.2 G/DL             Albumin   -  3.59                3.3 - 4.4 G/DL      Alpha-1 Globulin -  0.22                0.1 - 0.3 G/DL      Alpha-2 Globulin -  0.88                0.6 - 1.0 G/DL      Beta Globulins   -  0.79                0.6 - 1.1 G/DL      Gamma Globulins  -  3.02                0.7 - 1.7 G/DL          BAND SEENIN GAMMA REGION          TOTAL AMOUNT - 2.60 G/DL             ASSESSMENT:        DISTINCT BAND IN GAMMA REGION WITH SUPPRESSION OF NORMAL      GAMMAGLOBULIN SUGGESTIVE OF MONOCLONAL GAMMOPATHY. SERUM      AND URINE IMMUNOFIXATION ELECTROPHORESIS ARE RECOMMENDED IF      NOT PREVIOUSLY PERFORMED.        RADIOLOGY & ADDITIONAL TESTS:  Studies reviewed.    < from: US Renal (18 @ 14:40) >  IMPRESSION:     No hydronephrosis.    < end of copied text >        < from: CT Chest No Cont (18 @ 18:37) >  IMPRESSION: Diffuse lytic osseous lesions which may represent metastatic   disease versus multiple myeloma. Pathologic compression fractures of T7   and T12.  Large right inguinal hernia containing fat, vessels, most of the   ascending colon and hepatic flexure.  Few tree-in-bud opacities in the right upper and lower lobes representing   distal impacted airways and may be of infectious etiology.    < end of copied text >
Dr. Espinoza  Office [phone]  Cell [phone]  [place] CALL  Cell [phone]      Patient is a 88y old  Male who presents with a chief complaint of Back pain, unsteady gait (30 May 2018 21:24)      Patient seen and examined at bedside. No chest pain/sob    VITALS:  T(F): 97.4 (06-08-18 @ 13:16), Max: 97.5 (06-08-18 @ 05:18)  HR: 60 (06-08-18 @ 13:16)  BP: 119/62 (06-08-18 @ 13:16)  RR: 18 (06-08-18 @ 13:16)  SpO2: 97% (06-08-18 @ 13:16)  Wt(kg): --    Height (cm): 160 (06-08 @ 07:50)  Weight (kg): 53.4 (06-08 @ 07:50)  BMI (kg/m2): 20.9 (06-08 @ 07:50)  BSA (m2): 1.54 (06-08 @ 07:50)    PHYSICAL EXAM:  Constitutional: NAD  Neck: No JVD  Respiratory: CTAB, no wheezes, rales or rhonchi  Cardiovascular: S1, S2, RRR  Gastrointestinal: BS+, soft, NT/ND  Extremities: No peripheral edema    Hospital Medications:   MEDICATIONS  (STANDING):  atorvastatin 80 milliGRAM(s) Oral at bedtime  cyanocobalamin 1000 MICROGram(s) Oral daily  dextrose 5% + sodium chloride 0.9%. 1000 milliLiter(s) (50 mL/Hr) IV Continuous <Continuous>  dextrose 5%. 1000 milliLiter(s) (50 mL/Hr) IV Continuous <Continuous>  dextrose 50% Injectable 12.5 Gram(s) IV Push once  dextrose 50% Injectable 25 Gram(s) IV Push once  dextrose 50% Injectable 25 Gram(s) IV Push once  donepezil 5 milliGRAM(s) Oral at bedtime  folic acid 1 milliGRAM(s) Oral daily  insulin lispro (HumaLOG) corrective regimen sliding scale   SubCutaneous three times a day before meals  insulin lispro (HumaLOG) corrective regimen sliding scale   SubCutaneous at bedtime  memantine 10 milliGRAM(s) Oral two times a day  metoprolol succinate  milliGRAM(s) Oral daily      LABS:  06-08    136  |  102  |  18  ----------------------------<  154<H>  4.0   |  24  |  1.16    Ca    9.3      08 Jun 2018 06:00  Phos  3.6     06-08  Mg     1.8     06-08      Creatinine Trend: 1.16 <--, 1.22 <--, 1.31 <--, 1.34 <--, 1.37 <--, 1.37 <--    Phosphorus Level, Serum: 3.6 mg/dL (06-08 @ 06:00)                              13.6   9.00  )-----------( 155      ( 08 Jun 2018 06:00 )             41.0     Urine Studies:  Urinalysis - [05-31-18 @ 22:45]      Color PLYEL / Appearance CLEAR / SG 1.010 / pH 5.5      Gluc 50 / Ketone NEGATIVE  / Bili NEGATIVE / Urobili NORMAL       Blood NEGATIVE / Protein NEGATIVE / Leuk Est NEGATIVE / Nitrite NEGATIVE      RBC 0-2 / WBC 2-5 / Hyaline  / Gran  / Sq Epi OCC / Non Sq Epi  / Bacteria     Urine Protein 16.5      [06-02-18 @ 05:50]    PTH 5.92 (Ca --)      [06-01-18 @ 06:10]   --  Vitamin D (25OH) 42.8      [06-01-18 @ 06:10]  HbA1c 7.8      [06-01-18 @ 06:10]  TSH 1.18      [06-04-18 @ 05:20]      Free Light Chains: kappa 10.43, lambda 1.67, ratio = 6.25 H      [06-02 @ 06:00]    RADIOLOGY & ADDITIONAL STUDIES:
Dr. Espinoza  Office [phone]  Cell [phone]  [place] CALL  Cell [phone]      Patient is a 88y old  Male who presents with a chief complaint of Back pain, unsteady gait (30 May 2018 21:24)      Patient seen and examined at bedside. No chest pain/sob    VITALS:  T(F): 97.2 (06-09-18 @ 14:31), Max: 97.5 (06-08-18 @ 19:57)  HR: 67 (06-09-18 @ 14:31)  BP: 126/65 (06-09-18 @ 14:31)  RR: 18 (06-09-18 @ 14:31)  SpO2: 98% (06-09-18 @ 14:31)  Wt(kg): --        PHYSICAL EXAM:  Constitutional: NAD  Neck: No JVD  Respiratory: CTAB, no wheezes, rales or rhonchi  Cardiovascular: S1, S2, RRR  Gastrointestinal: BS+, soft, NT/ND  Extremities: No peripheral edema    Hospital Medications:   MEDICATIONS  (STANDING):  atorvastatin 80 milliGRAM(s) Oral at bedtime  cyanocobalamin 1000 MICROGram(s) Oral daily  dextrose 5% + sodium chloride 0.9%. 1000 milliLiter(s) (50 mL/Hr) IV Continuous <Continuous>  dextrose 5%. 1000 milliLiter(s) (50 mL/Hr) IV Continuous <Continuous>  dextrose 50% Injectable 12.5 Gram(s) IV Push once  dextrose 50% Injectable 25 Gram(s) IV Push once  dextrose 50% Injectable 25 Gram(s) IV Push once  donepezil 5 milliGRAM(s) Oral at bedtime  folic acid 1 milliGRAM(s) Oral daily  insulin lispro (HumaLOG) corrective regimen sliding scale   SubCutaneous three times a day before meals  insulin lispro (HumaLOG) corrective regimen sliding scale   SubCutaneous at bedtime  memantine 10 milliGRAM(s) Oral two times a day  metoprolol succinate  milliGRAM(s) Oral daily      LABS:  06-09    137  |  103  |  16  ----------------------------<  163<H>  3.6   |  25  |  1.16    Ca    9.2      09 Jun 2018 05:57  Phos  3.6     06-08  Mg     1.8     06-08      Creatinine Trend: 1.16 <--, 1.16 <--, 1.22 <--, 1.31 <--, 1.34 <--, 1.37 <--                                13.0   10.32 )-----------( 166      ( 09 Jun 2018 05:57 )             40.7     Urine Studies:  Urinalysis - [05-31-18 @ 22:45]      Color PLYEL / Appearance CLEAR / SG 1.010 / pH 5.5      Gluc 50 / Ketone NEGATIVE  / Bili NEGATIVE / Urobili NORMAL       Blood NEGATIVE / Protein NEGATIVE / Leuk Est NEGATIVE / Nitrite NEGATIVE      RBC 0-2 / WBC 2-5 / Hyaline  / Gran  / Sq Epi OCC / Non Sq Epi  / Bacteria       PTH 5.92 (Ca --)      [06-01-18 @ 06:10]   --  Vitamin D (25OH) 42.8      [06-01-18 @ 06:10]  HbA1c 7.8      [06-01-18 @ 06:10]  TSH 1.18      [06-04-18 @ 05:20]      Free Light Chains: kappa 10.43, lambda 1.67, ratio = 6.25 H      [06-02 @ 06:00]    RADIOLOGY & ADDITIONAL STUDIES:
Dr. Espinoza (Nephrology)  Office [phone]  Cell [phone]  Lyndsey CALL  Cell [phone]      Patient is a 88y old  Male who presents with a chief complaint of s/p fall (12 Jun 2018 10:08)      Patient seen and examined at bedside. No chest pain/sob    VITALS:  T(F): 97.7 (06-13-18 @ 04:39), Max: 98.4 (06-13-18 @ 00:58)  HR: 63 (06-13-18 @ 04:39)  BP: 113/53 (06-13-18 @ 04:39)  RR: 18 (06-13-18 @ 04:39)  SpO2: 100% (06-13-18 @ 04:39)  Wt(kg): --        PHYSICAL EXAM:  Constitutional: NAD  Neck: No JVD  Respiratory: CTAB, no wheezes, rales or rhonchi  Cardiovascular: S1, S2, RRR  Gastrointestinal: BS+, soft, NT/ND  Extremities: No peripheral edema    Hospital Medications:   MEDICATIONS  (STANDING):  aspirin  chewable 81 milliGRAM(s) Oral daily  atorvastatin 80 milliGRAM(s) Oral at bedtime  cyanocobalamin 1000 MICROGram(s) Oral daily  dextrose 5%. 1000 milliLiter(s) (50 mL/Hr) IV Continuous <Continuous>  dextrose 50% Injectable 12.5 Gram(s) IV Push once  dextrose 50% Injectable 25 Gram(s) IV Push once  dextrose 50% Injectable 25 Gram(s) IV Push once  donepezil 5 milliGRAM(s) Oral at bedtime  folic acid 1 milliGRAM(s) Oral daily  insulin lispro (HumaLOG) corrective regimen sliding scale   SubCutaneous three times a day before meals  insulin lispro (HumaLOG) corrective regimen sliding scale   SubCutaneous at bedtime  memantine 10 milliGRAM(s) Oral two times a day      LABS:  06-12    137  |  106  |  12  ----------------------------<  178<H>  4.2   |  21<L>  |  1.09    Ca    9.3      12 Jun 2018 04:28      Creatinine Trend: 1.09 <--, 1.11 <--, 1.07 <--, 1.16 <--, 1.16 <--, 1.22 <--                                12.9   8.52  )-----------( 192      ( 12 Jun 2018 04:28 )             39.5     Urine Studies:  Urinalysis - [05-31-18 @ 22:45]      Color PLYEL / Appearance CLEAR / SG 1.010 / pH 5.5      Gluc 50 / Ketone NEGATIVE  / Bili NEGATIVE / Urobili NORMAL       Blood NEGATIVE / Protein NEGATIVE / Leuk Est NEGATIVE / Nitrite NEGATIVE      RBC 0-2 / WBC 2-5 / Hyaline  / Gran  / Sq Epi OCC / Non Sq Epi  / Bacteria       PTH 5.92 (Ca --)      [06-01-18 @ 06:10]   --  Vitamin D (25OH) 42.8      [06-01-18 @ 06:10]  HbA1c 7.8      [06-01-18 @ 06:10]  TSH 1.18      [06-04-18 @ 05:20]      Free Light Chains: kappa 10.43, lambda 1.67, ratio = 6.25 H      [06-02 @ 06:00]    RADIOLOGY & ADDITIONAL STUDIES:
Dr. Espinoza (Nephrology)  Office [phone]  Cell [phone]  Lyndsey CALL  Cell [phone]      Patient is a 88y old  Male who presents with a chief complaint of Back pain, unsteady gait (30 May 2018 21:24)      Patient seen and examined at bedside. No chest pain/sob    VITALS:  T(F): 97.1 (06-01-18 @ 12:26), Max: 97.8 (05-31-18 @ 13:34)  HR: 94 (06-01-18 @ 12:26)  BP: 106/71 (06-01-18 @ 12:26)  RR: 18 (06-01-18 @ 12:26)  SpO2: 100% (06-01-18 @ 12:26)  Wt(kg): --        PHYSICAL EXAM:  Constitutional: NAD  Neck: No JVD  Respiratory: CTAB, no wheezes, rales or rhonchi  Cardiovascular: S1, S2, RRR  Gastrointestinal: BS+, soft, NT/ND  Extremities: No peripheral edema    Hospital Medications:   MEDICATIONS  (STANDING):  aspirin enteric coated 81 milliGRAM(s) Oral daily  atorvastatin 80 milliGRAM(s) Oral at bedtime  cyanocobalamin 1000 MICROGram(s) Oral daily  dextrose 5%. 1000 milliLiter(s) (50 mL/Hr) IV Continuous <Continuous>  dextrose 50% Injectable 12.5 Gram(s) IV Push once  dextrose 50% Injectable 25 Gram(s) IV Push once  dextrose 50% Injectable 25 Gram(s) IV Push once  donepezil 5 milliGRAM(s) Oral at bedtime  folic acid 1 milliGRAM(s) Oral daily  insulin lispro (HumaLOG) corrective regimen sliding scale   SubCutaneous three times a day before meals  insulin lispro (HumaLOG) corrective regimen sliding scale   SubCutaneous at bedtime  metoprolol succinate  milliGRAM(s) Oral daily  sodium chloride 0.9%. 1000 milliLiter(s) (75 mL/Hr) IV Continuous <Continuous>      LABS:  06-01    139  |  105  |  18  ----------------------------<  131<H>  4.2   |  22  |  1.46<H>    Ca    10.1      01 Jun 2018 06:10  Phos  3.2     06-01  Mg     1.8     06-01    TPro  8.5<H>  /  Alb      /  TBili      /  DBili      /  AST      /  ALT      /  AlkPhos      06-01    Creatinine Trend: 1.46 <--, 1.80 <--, 2.29 <--    Phosphorus Level, Serum: 3.2 mg/dL (06-01 @ 06:10)    calcium--  intact pth--  parathyroid hormone intact, serum5.92                            14.6   9.95  )-----------( 123      ( 01 Jun 2018 06:10 )             44.9     Urine Studies:  Urinalysis - [05-31-18 @ 22:45]      Color PLYEL / Appearance CLEAR / SG 1.010 / pH 5.5      Gluc 50 / Ketone NEGATIVE  / Bili NEGATIVE / Urobili NORMAL       Blood NEGATIVE / Protein NEGATIVE / Leuk Est NEGATIVE / Nitrite NEGATIVE      RBC 0-2 / WBC 2-5 / Hyaline  / Gran  / Sq Epi OCC / Non Sq Epi  / Bacteria     Urine Creatinine 58.09      [05-31-18 @ 22:45]  Urine Protein 8.6      [05-31-18 @ 22:45]    PTH 5.92 (Ca --)      [06-01-18 @ 06:10]   --  HbA1c 7.8      [06-01-18 @ 06:10]        RADIOLOGY & ADDITIONAL STUDIES:
Dr. Espinoza (Nephrology)  Office [phone]  Cell [phone]  Lyndsey CALL  Cell [phone]      Patient is a 88y old  Male who presents with a chief complaint of Back pain, unsteady gait (30 May 2018 21:24)      Patient seen and examined at bedside. No chest pain/sob    VITALS:  T(F): 97.3 (06-04-18 @ 04:58), Max: 97.8 (06-03-18 @ 13:54)  HR: 66 (06-04-18 @ 04:58)  BP: 135/61 (06-04-18 @ 04:58)  RR: 18 (06-04-18 @ 04:58)  SpO2: 99% (06-04-18 @ 04:58)  Wt(kg): --    06-03 @ 07:01  -  06-04 @ 07:00  --------------------------------------------------------  IN: 750 mL / OUT: 0 mL / NET: 750 mL          PHYSICAL EXAM:  Constitutional: NAD  Neck: No JVD  Respiratory: CTAB, no wheezes, rales or rhonchi  Cardiovascular: S1, S2, RRR  Gastrointestinal: BS+, soft, NT/ND  Extremities: No peripheral edema    Hospital Medications:   MEDICATIONS  (STANDING):  atorvastatin 80 milliGRAM(s) Oral at bedtime  cyanocobalamin 1000 MICROGram(s) Oral daily  dextrose 5%. 1000 milliLiter(s) (50 mL/Hr) IV Continuous <Continuous>  dextrose 50% Injectable 12.5 Gram(s) IV Push once  dextrose 50% Injectable 25 Gram(s) IV Push once  dextrose 50% Injectable 25 Gram(s) IV Push once  donepezil 5 milliGRAM(s) Oral at bedtime  folic acid 1 milliGRAM(s) Oral daily  insulin lispro (HumaLOG) corrective regimen sliding scale   SubCutaneous three times a day before meals  insulin lispro (HumaLOG) corrective regimen sliding scale   SubCutaneous at bedtime  metoprolol succinate  milliGRAM(s) Oral daily      LABS:  06-04    136  |  104  |  17  ----------------------------<  137<H>  4.2   |  21<L>  |  1.34<H>    Ca    9.1      04 Jun 2018 05:20      Creatinine Trend: 1.34 <--, 1.37 <--, 1.37 <--, 1.46 <--, 1.80 <--, 2.29 <--                                15.1   8.87  )-----------( 132      ( 04 Jun 2018 05:20 )             45.7     Urine Studies:  Urinalysis - [05-31-18 @ 22:45]      Color PLYEL / Appearance CLEAR / SG 1.010 / pH 5.5      Gluc 50 / Ketone NEGATIVE  / Bili NEGATIVE / Urobili NORMAL       Blood NEGATIVE / Protein NEGATIVE / Leuk Est NEGATIVE / Nitrite NEGATIVE      RBC 0-2 / WBC 2-5 / Hyaline  / Gran  / Sq Epi OCC / Non Sq Epi  / Bacteria     Urine Creatinine 58.09      [05-31-18 @ 22:45]  Urine Protein 16.5      [06-02-18 @ 05:50]    PTH 5.92 (Ca --)      [06-01-18 @ 06:10]   --  Vitamin D (25OH) 42.8      [06-01-18 @ 06:10]  HbA1c 7.8      [06-01-18 @ 06:10]  TSH 1.18      [06-04-18 @ 05:20]      Free Light Chains: kappa 10.43, lambda 1.67, ratio = --      [06-02 @ 06:00]    RADIOLOGY & ADDITIONAL STUDIES:
Dr. Espinoza (Nephrology)  Office [phone]  Cell [phone]  Lyndsey CALL  Cell [phone]      Patient is a 88y old  Male who presents with a chief complaint of Back pain, unsteady gait (30 May 2018 21:24)      Patient seen and examined at bedside.     VITALS:  T(F): 97.4 (06-06-18 @ 13:09), Max: 97.8 (06-05-18 @ 21:13)  HR: 71 (06-06-18 @ 13:09)  BP: 107/64 (06-06-18 @ 13:09)  RR: 16 (06-06-18 @ 13:09)  SpO2: 99% (06-06-18 @ 13:09)  Wt(kg): --        PHYSICAL EXAM:  Constitutional: lethargic   Neck: No JVD  Respiratory: CTAB, no wheezes, rales or rhonchi  Cardiovascular: S1, S2, RRR  Gastrointestinal: BS+, soft, NT/ND  Extremities: No peripheral edema    Hospital Medications:   MEDICATIONS  (STANDING):  atorvastatin 80 milliGRAM(s) Oral at bedtime  cyanocobalamin 1000 MICROGram(s) Oral daily  dextrose 5%. 1000 milliLiter(s) (50 mL/Hr) IV Continuous <Continuous>  dextrose 50% Injectable 12.5 Gram(s) IV Push once  dextrose 50% Injectable 25 Gram(s) IV Push once  dextrose 50% Injectable 25 Gram(s) IV Push once  donepezil 5 milliGRAM(s) Oral at bedtime  folic acid 1 milliGRAM(s) Oral daily  heparin  Injectable 5000 Unit(s) SubCutaneous every 8 hours  insulin lispro (HumaLOG) corrective regimen sliding scale   SubCutaneous three times a day before meals  insulin lispro (HumaLOG) corrective regimen sliding scale   SubCutaneous at bedtime  metoprolol succinate  milliGRAM(s) Oral daily  sodium chloride 0.9%. 1000 milliLiter(s) (75 mL/Hr) IV Continuous <Continuous>      LABS:  06-06    136  |  103  |  24<H>  ----------------------------<  126<H>  4.0   |  20<L>  |  1.31<H>    Ca    9.1      06 Jun 2018 05:20    TPro  8.2  /  Alb      /  TBili      /  DBili      /  AST      /  ALT      /  AlkPhos      06-04    Creatinine Trend: 1.31 <--, 1.34 <--, 1.37 <--, 1.37 <--, 1.46 <--, 1.80 <--, 2.29 <--                                13.2   10.41 )-----------( 128      ( 06 Jun 2018 05:20 )             40.7     Urine Studies:  Urinalysis - [05-31-18 @ 22:45]      Color PLYEL / Appearance CLEAR / SG 1.010 / pH 5.5      Gluc 50 / Ketone NEGATIVE  / Bili NEGATIVE / Urobili NORMAL       Blood NEGATIVE / Protein NEGATIVE / Leuk Est NEGATIVE / Nitrite NEGATIVE      RBC 0-2 / WBC 2-5 / Hyaline  / Gran  / Sq Epi OCC / Non Sq Epi  / Bacteria     Urine Creatinine 58.09      [05-31-18 @ 22:45]  Urine Protein 16.5      [06-02-18 @ 05:50]    PTH 5.92 (Ca --)      [06-01-18 @ 06:10]   --  Vitamin D (25OH) 42.8      [06-01-18 @ 06:10]  HbA1c 7.8      [06-01-18 @ 06:10]  TSH 1.18      [06-04-18 @ 05:20]      Free Light Chains: kappa 10.43, lambda 1.67, ratio = 6.25 H      [06-02 @ 06:00]    RADIOLOGY & ADDITIONAL STUDIES:
Dr. Espinoza (Nephrology)  Office [phone]  Cell [phone]  [facility] CALL  Cell [phone]      Patient is a 88y old  Male who presents with a chief complaint of Back pain, unsteady gait (30 May 2018 21:24)      Patient seen and examined at bedside. No chest pain/sob    VITALS:  T(F): 97.9 (06-05-18 @ 05:51), Max: 98.5 (06-04-18 @ 13:41)  HR: 61 (06-05-18 @ 05:51)  BP: 127/66 (06-05-18 @ 05:51)  RR: 18 (06-05-18 @ 05:51)  SpO2: 98% (06-05-18 @ 05:51)  Wt(kg): --        PHYSICAL EXAM:  Constitutional: NAD  Neck: No JVD  Respiratory: CTAB, no wheezes, rales or rhonchi  Cardiovascular: S1, S2, RRR  Gastrointestinal: BS+, soft, NT/ND  Extremities: No peripheral edema    Hospital Medications:   MEDICATIONS  (STANDING):  atorvastatin 80 milliGRAM(s) Oral at bedtime  cyanocobalamin 1000 MICROGram(s) Oral daily  dextrose 5%. 1000 milliLiter(s) (50 mL/Hr) IV Continuous <Continuous>  dextrose 50% Injectable 12.5 Gram(s) IV Push once  dextrose 50% Injectable 25 Gram(s) IV Push once  dextrose 50% Injectable 25 Gram(s) IV Push once  donepezil 5 milliGRAM(s) Oral at bedtime  folic acid 1 milliGRAM(s) Oral daily  heparin  Injectable 5000 Unit(s) SubCutaneous every 8 hours  insulin lispro (HumaLOG) corrective regimen sliding scale   SubCutaneous three times a day before meals  insulin lispro (HumaLOG) corrective regimen sliding scale   SubCutaneous at bedtime  metoprolol succinate  milliGRAM(s) Oral daily      LABS:  06-04    136  |  104  |  17  ----------------------------<  137<H>  4.2   |  21<L>  |  1.34<H>    Ca    9.1      04 Jun 2018 05:20    TPro  8.2  /  Alb      /  TBili      /  DBili      /  AST      /  ALT      /  AlkPhos      06-04    Creatinine Trend: 1.34 <--, 1.37 <--, 1.37 <--, 1.46 <--, 1.80 <--, 2.29 <--                                15.1   8.87  )-----------( 132      ( 04 Jun 2018 05:20 )             45.7     Urine Studies:  Urinalysis - [05-31-18 @ 22:45]      Color PLYEL / Appearance CLEAR / SG 1.010 / pH 5.5      Gluc 50 / Ketone NEGATIVE  / Bili NEGATIVE / Urobili NORMAL       Blood NEGATIVE / Protein NEGATIVE / Leuk Est NEGATIVE / Nitrite NEGATIVE      RBC 0-2 / WBC 2-5 / Hyaline  / Gran  / Sq Epi OCC / Non Sq Epi  / Bacteria     Urine Creatinine 58.09      [05-31-18 @ 22:45]  Urine Protein 16.5      [06-02-18 @ 05:50]    PTH 5.92 (Ca --)      [06-01-18 @ 06:10]   --  Vitamin D (25OH) 42.8      [06-01-18 @ 06:10]  HbA1c 7.8      [06-01-18 @ 06:10]  TSH 1.18      [06-04-18 @ 05:20]      Free Light Chains: kappa 10.43, lambda 1.67, ratio = 6.25 H      [06-02 @ 06:00]    RADIOLOGY & ADDITIONAL STUDIES:
Emir Cox MD  Interventional Cardiology / Advance Heart Failure and Cardiac Transplant Specialist  [place] Office : [address] [place] [address]  Tel:   [place] Office : [address] [facility] [address] [address]  Tel: [phone]  Cell : [phone]    Subjective : Pt lying in bed comfortable, not in distress, denies any chest pain or SOB  	  MEDICATIONS:  aspirin  chewable 81 milliGRAM(s) Oral daily        acetaminophen   Tablet. 650 milliGRAM(s) Oral every 6 hours PRN  donepezil 5 milliGRAM(s) Oral at bedtime  melatonin 3 milliGRAM(s) Oral at bedtime PRN  memantine 10 milliGRAM(s) Oral two times a day      atorvastatin 80 milliGRAM(s) Oral at bedtime  dextrose 40% Gel 15 Gram(s) Oral once PRN  dextrose 50% Injectable 12.5 Gram(s) IV Push once  dextrose 50% Injectable 25 Gram(s) IV Push once  dextrose 50% Injectable 25 Gram(s) IV Push once  glucagon  Injectable 1 milliGRAM(s) IntraMuscular once PRN  insulin lispro (HumaLOG) corrective regimen sliding scale   SubCutaneous three times a day before meals  insulin lispro (HumaLOG) corrective regimen sliding scale   SubCutaneous at bedtime    cyanocobalamin 1000 MICROGram(s) Oral daily  dextrose 5%. 1000 milliLiter(s) IV Continuous <Continuous>  folic acid 1 milliGRAM(s) Oral daily      PHYSICAL EXAM:  T(C): 36.5 (06-13-18 @ 04:39), Max: 36.9 (06-13-18 @ 00:58)  HR: 63 (06-13-18 @ 04:39) (62 - 76)  BP: 113/53 (06-13-18 @ 04:39) (96/50 - 118/54)  RR: 18 (06-13-18 @ 04:39) (17 - 18)  SpO2: 100% (06-13-18 @ 04:39) (96% - 100%)  Wt(kg): --  I&O's Summary        Appearance: Normal	  HEENT:   Normal oral mucosa, PERRL, EOMI	  Cardiovascular: Normal S1 S2, No JVD, No murmurs, No edema  Respiratory: Lungs clear to auscultation	  Gastrointestinal:  Soft, Non-tender, + BS	  Extremities: Normal range of motion, No clubbing, cyanosis or edema                                    12.9   8.52  )-----------( 192      ( 12 Jun 2018 04:28 )             39.5     06-12    137  |  106  |  12  ----------------------------<  178<H>  4.2   |  21<L>  |  1.09    Ca    9.3      12 Jun 2018 04:28      proBNP:   Lipid Profile:   HgA1c:   TSH:
Emir Cox MD  Interventional Cardiology / Advance Heart Failure and Cardiac Transplant Specialist  [place] Office : [address] [place] [address]  Tel:   [place] Office : [address] [facility] [address] [address]  Tel: [phone]  Cell : [phone]    Subjective : Pt lying in bed comfortable, not in distress, denies any chest pain or SOB  	  MEDICATIONS:  metoprolol succinate  milliGRAM(s) Oral daily        acetaminophen   Tablet. 650 milliGRAM(s) Oral every 6 hours PRN  donepezil 5 milliGRAM(s) Oral at bedtime  melatonin 3 milliGRAM(s) Oral at bedtime PRN  memantine 10 milliGRAM(s) Oral two times a day      atorvastatin 80 milliGRAM(s) Oral at bedtime  dextrose 40% Gel 15 Gram(s) Oral once PRN  dextrose 50% Injectable 12.5 Gram(s) IV Push once  dextrose 50% Injectable 25 Gram(s) IV Push once  dextrose 50% Injectable 25 Gram(s) IV Push once  glucagon  Injectable 1 milliGRAM(s) IntraMuscular once PRN  insulin lispro (HumaLOG) corrective regimen sliding scale   SubCutaneous three times a day before meals  insulin lispro (HumaLOG) corrective regimen sliding scale   SubCutaneous at bedtime    cyanocobalamin 1000 MICROGram(s) Oral daily  dextrose 5%. 1000 milliLiter(s) IV Continuous <Continuous>  folic acid 1 milliGRAM(s) Oral daily      PHYSICAL EXAM:  T(C): 36.2 (06-12-18 @ 04:02), Max: 37 (06-11-18 @ 22:38)  HR: 67 (06-12-18 @ 04:02) (67 - 84)  BP: 110/52 (06-12-18 @ 04:02) (110/52 - 120/60)  RR: 18 (06-12-18 @ 04:02) (18 - 20)  SpO2: 98% (06-12-18 @ 04:02) (98% - 100%)  Wt(kg): --  I&O's Summary        Appearance: Normal	  HEENT:   Normal oral mucosa, PERRL, EOMI	  Cardiovascular: Normal S1 S2, No JVD, No murmurs, No edema  Respiratory: Lungs clear to auscultation	  Gastrointestinal:  Soft, Non-tender, + BS	  Extremities: Normal range of motion, No clubbing, cyanosis or edema                                    12.9   8.52  )-----------( 192      ( 12 Jun 2018 04:28 )             39.5     06-12    137  |  106  |  12  ----------------------------<  178<H>  4.2   |  21<L>  |  1.09    Ca    9.3      12 Jun 2018 04:28      proBNP:   Lipid Profile:   HgA1c:   TSH:
Patient seen and examined at bedside. No chest pain/sob    VITALS:  T(F): 97.1 (06-12-18 @ 04:02), Max: 98.6 (06-11-18 @ 22:38)  HR: 67 (06-12-18 @ 04:02)  BP: 110/52 (06-12-18 @ 04:02)  RR: 18 (06-12-18 @ 04:02)  SpO2: 98% (06-12-18 @ 04:02)  Wt(kg): --        PHYSICAL EXAM:  Constitutional: NAD  Neck: No JVD  Respiratory: CTAB, no wheezes, rales or rhonchi  Cardiovascular: S1, S2, RRR  Gastrointestinal: BS+, soft, NT/ND  Extremities: No peripheral edema    Hospital Medications:   MEDICATIONS  (STANDING):  atorvastatin 80 milliGRAM(s) Oral at bedtime  cyanocobalamin 1000 MICROGram(s) Oral daily  dextrose 5%. 1000 milliLiter(s) (50 mL/Hr) IV Continuous <Continuous>  dextrose 50% Injectable 12.5 Gram(s) IV Push once  dextrose 50% Injectable 25 Gram(s) IV Push once  dextrose 50% Injectable 25 Gram(s) IV Push once  donepezil 5 milliGRAM(s) Oral at bedtime  folic acid 1 milliGRAM(s) Oral daily  insulin lispro (HumaLOG) corrective regimen sliding scale   SubCutaneous three times a day before meals  insulin lispro (HumaLOG) corrective regimen sliding scale   SubCutaneous at bedtime  memantine 10 milliGRAM(s) Oral two times a day  metoprolol succinate  milliGRAM(s) Oral daily      LABS:  06-12    137  |  106  |  12  ----------------------------<  178<H>  4.2   |  21<L>  |  1.09    Ca    9.3      12 Jun 2018 04:28      Creatinine Trend: 1.09 <--, 1.11 <--, 1.07 <--, 1.16 <--, 1.16 <--, 1.22 <--, 1.31 <--                              12.9   8.52  )-----------( 192      ( 12 Jun 2018 04:28 )             39.5     Urine Studies:      Urinalysis - [05-31-18 @ 22:45]      Color PLYEL / Appearance CLEAR / SG 1.010 / pH 5.5      Gluc 50 / Ketone NEGATIVE  / Bili NEGATIVE / Urobili NORMAL       Blood NEGATIVE / Protein NEGATIVE / Leuk Est NEGATIVE / Nitrite NEGATIVE      RBC 0-2 / WBC 2-5 / Hyaline  / Gran  / Sq Epi OCC / Non Sq Epi  / Bacteria       PTH 5.92 (Ca --)      [06-01-18 @ 06:10]   --  Vitamin D (25OH) 42.8      [06-01-18 @ 06:10]  HbA1c 7.8      [06-01-18 @ 06:10]  TSH 1.18      [06-04-18 @ 05:20]      Free Light Chains: kappa 10.43, lambda 1.67, ratio = 6.25 H      [06-02 @ 06:00]    RADIOLOGY & ADDITIONAL STUDIES:
Patient seen and examined at bedside. No chest pain/sob    VITALS:  T(F): 97.9 (06-10-18 @ 13:24), Max: 98.6 (06-09-18 @ 19:37)  HR: 75 (06-10-18 @ 13:24)  BP: 99/54 (06-10-18 @ 13:24)  RR: 18 (06-10-18 @ 13:24)  SpO2: 98% (06-10-18 @ 13:24)  Wt(kg): --        PHYSICAL EXAM:  Constitutional: NAD  Neck: No JVD  Respiratory: CTAB, no wheezes, rales or rhonchi  Cardiovascular: S1, S2, RRR  Gastrointestinal: BS+, soft, NT/ND  Extremities: No peripheral edema    Hospital Medications:   MEDICATIONS  (STANDING):  atorvastatin 80 milliGRAM(s) Oral at bedtime  cyanocobalamin 1000 MICROGram(s) Oral daily  dextrose 5% + sodium chloride 0.9%. 1000 milliLiter(s) (50 mL/Hr) IV Continuous <Continuous>  dextrose 5%. 1000 milliLiter(s) (50 mL/Hr) IV Continuous <Continuous>  dextrose 50% Injectable 12.5 Gram(s) IV Push once  dextrose 50% Injectable 25 Gram(s) IV Push once  dextrose 50% Injectable 25 Gram(s) IV Push once  donepezil 5 milliGRAM(s) Oral at bedtime  folic acid 1 milliGRAM(s) Oral daily  insulin lispro (HumaLOG) corrective regimen sliding scale   SubCutaneous three times a day before meals  insulin lispro (HumaLOG) corrective regimen sliding scale   SubCutaneous at bedtime  memantine 10 milliGRAM(s) Oral two times a day  metoprolol succinate  milliGRAM(s) Oral daily      LABS:  06-10    133<L>  |  103  |  14  ----------------------------<  172<H>  4.0   |  17<L>  |  1.07    Ca    9.4      10 Jeremy 2018 05:12      Creatinine Trend: 1.07 <--, 1.16 <--, 1.16 <--, 1.22 <--, 1.31 <--, 1.34 <--                              13.9   6.75  )-----------( 189      ( 10 Jeremy 2018 10:13 )             43.4     Urine Studies:      Urinalysis - [05-31-18 @ 22:45]      Color PLYEL / Appearance CLEAR / SG 1.010 / pH 5.5      Gluc 50 / Ketone NEGATIVE  / Bili NEGATIVE / Urobili NORMAL       Blood NEGATIVE / Protein NEGATIVE / Leuk Est NEGATIVE / Nitrite NEGATIVE      RBC 0-2 / WBC 2-5 / Hyaline  / Gran  / Sq Epi OCC / Non Sq Epi  / Bacteria       PTH 5.92 (Ca --)      [06-01-18 @ 06:10]   --  Vitamin D (25OH) 42.8      [06-01-18 @ 06:10]  HbA1c 7.8      [06-01-18 @ 06:10]  TSH 1.18      [06-04-18 @ 05:20]      Free Light Chains: kappa 10.43, lambda 1.67, ratio = 6.25 H      [06-02 @ 06:00]    RADIOLOGY & ADDITIONAL STUDIES:
chief complaint : unsteady gait        SUBJECTIVE / OVERNIGHT EVENTS: pt sitting comfortably , denies chest pain, shortness of breath, nausea, v    MEDICATIONS  (STANDING):  aspirin enteric coated 81 milliGRAM(s) Oral daily  atorvastatin 80 milliGRAM(s) Oral at bedtime  cyanocobalamin 1000 MICROGram(s) Oral daily  dextrose 5%. 1000 milliLiter(s) (50 mL/Hr) IV Continuous <Continuous>  dextrose 50% Injectable 12.5 Gram(s) IV Push once  dextrose 50% Injectable 25 Gram(s) IV Push once  dextrose 50% Injectable 25 Gram(s) IV Push once  donepezil 5 milliGRAM(s) Oral at bedtime  folic acid 1 milliGRAM(s) Oral daily  insulin lispro (HumaLOG) corrective regimen sliding scale   SubCutaneous three times a day before meals  insulin lispro (HumaLOG) corrective regimen sliding scale   SubCutaneous at bedtime  metoprolol succinate  milliGRAM(s) Oral daily  sodium chloride 0.9%. 1000 milliLiter(s) (75 mL/Hr) IV Continuous <Continuous>    MEDICATIONS  (PRN):  acetaminophen   Tablet. 650 milliGRAM(s) Oral every 6 hours PRN back pain  dextrose 40% Gel 15 Gram(s) Oral once PRN Blood Glucose LESS THAN 70 milliGRAM(s)/deciliter  glucagon  Injectable 1 milliGRAM(s) IntraMuscular once PRN Glucose LESS THAN 70 milligrams/deciliter      Vital Signs Last 24 Hrs  T(C): 36.2 (2018 12:26), Max: 36.6 (2018 06:11)  T(F): 97.1 (2018 12:26), Max: 97.8 (2018 06:11)  HR: 94 (2018 12:26) (74 - 94)  BP: 106/71 (2018 12:26) (105/61 - 116/64)  BP(mean): --  RR: 18 (2018 12:26) (18 - 20)  SpO2: 100% (2018 12:) (98% - 100%)    Constitutional: No fever, fatigue  Skin: No rash.  Eyes: No recent vision problems or eye pain.  ENT: No congestion, ear pain, or sore throat.  Cardiovascular: No chest pain or palpation.  Respiratory: No cough, shortness of breath, congestion, or wheezing.  Gastrointestinal: No abdominal pain, nausea, vomiting, or diarrhea.  Genitourinary: No dysuria.  Musculoskeletal: No joint swelling.  Neurologic: No headache.    PHYSICAL EXAM:  GENERAL: NAD  EYES: EOMI, PERRLA  NECK: Supple, No JVD  CHEST/LUNG: dec breath sounds at bases   HEART:  S1 , S2 +  ABDOMEN: soft, bs+  EXTREMITIES: no edema  NEUROLOGY:alert awake calm cooperative      LABS:      139  |  105  |  18  ----------------------------<  131<H>  4.2   |  22  |  1.46<H>    Ca    10.1      2018 06:10  Phos  3.2       Mg     1.8         TPro  8.5<H>  /  Alb      /  TBili      /  DBili      /  AST      /  ALT      /  AlkPhos          Creatinine Trend: 1.46 <--, 1.80 <--, 2.29 <--                        14.6   9.95  )-----------( 123      ( 2018 06:10 )             44.9     Urine Studies:  Urinalysis Basic - ( 31 May 2018 22:45 )    Color: PLYEL / Appearance: CLEAR / S.010 / pH: 5.5  Gluc: 50 / Ketone: NEGATIVE  / Bili: NEGATIVE / Urobili: NORMAL mg/dL   Blood: NEGATIVE / Protein: NEGATIVE mg/dL / Nitrite: NEGATIVE   Leuk Esterase: NEGATIVE / RBC: 0-2 / WBC 2-5   Sq Epi: OCC / Non Sq Epi:  / Bacteria:       Creatinine, Random Urine: 58.09 mg/dL ( @ 22:45)  Protein, Random Urine: 8.6 mg/dL ( @ 22:45)          LIVER FUNCTIONS - ( 2018 06:10 )  Alb: x     / Pro: 8.5 g/dL / ALK PHOS: x     / ALT: x     / AST: x     / GGT: x
chief complaint : unsteady gait        SUBJECTIVE / OVERNIGHT EVENTS: pt sitting comfortably , denies chest pain, shortness of breath, nausea, vomiting    MEDICATIONS  (STANDING):  aspirin  chewable 81 milliGRAM(s) Oral daily  atorvastatin 80 milliGRAM(s) Oral at bedtime  cyanocobalamin 1000 MICROGram(s) Oral daily  dextrose 5%. 1000 milliLiter(s) (50 mL/Hr) IV Continuous <Continuous>  dextrose 50% Injectable 12.5 Gram(s) IV Push once  dextrose 50% Injectable 25 Gram(s) IV Push once  dextrose 50% Injectable 25 Gram(s) IV Push once  donepezil 5 milliGRAM(s) Oral at bedtime  folic acid 1 milliGRAM(s) Oral daily  insulin lispro (HumaLOG) corrective regimen sliding scale   SubCutaneous three times a day before meals  insulin lispro (HumaLOG) corrective regimen sliding scale   SubCutaneous at bedtime  memantine 10 milliGRAM(s) Oral two times a day  metoprolol succinate  milliGRAM(s) Oral daily    MEDICATIONS  (PRN):  acetaminophen   Tablet. 650 milliGRAM(s) Oral every 6 hours PRN back pain  dextrose 40% Gel 15 Gram(s) Oral once PRN Blood Glucose LESS THAN 70 milliGRAM(s)/deciliter  glucagon  Injectable 1 milliGRAM(s) IntraMuscular once PRN Glucose LESS THAN 70 milligrams/deciliter  melatonin 3 milliGRAM(s) Oral at bedtime PRN Insomnia      Vital Signs Last 24 Hrs  T(C): 36.7 (12 Jun 2018 19:15), Max: 37 (11 Jun 2018 22:38)  T(F): 98.1 (12 Jun 2018 19:15), Max: 98.6 (11 Jun 2018 22:38)  HR: 70 (12 Jun 2018 19:15) (62 - 84)  BP: 100/48 (12 Jun 2018 19:15) (96/50 - 120/60)  BP(mean): --  RR: 18 (12 Jun 2018 19:15) (18 - 20)  SpO2: 99% (12 Jun 2018 19:15) (96% - 100%)    Constitutional: No fever, fatigue  Skin: No rash.  Eyes: No recent vision problems or eye pain.  ENT: No congestion, ear pain, or sore throat.  Cardiovascular: No chest pain or palpation.  Respiratory: No cough, shortness of breath, congestion, or wheezing.  Gastrointestinal: No abdominal pain, nausea, vomiting, or diarrhea.  Genitourinary: No dysuria.  Musculoskeletal: No joint swelling.  Neurologic: No headache.    PHYSICAL EXAM:  GENERAL: NAD  EYES: EOMI, PERRLA  NECK: Supple, No JVD  CHEST/LUNG: dec breath sounds at bases   HEART:  S1 , S2 +  ABDOMEN: soft, bs+  EXTREMITIES: no edema  NEUROLOGY:alert awake calm cooperative    LABS:  06-12    137  |  106  |  12  ----------------------------<  178<H>  4.2   |  21<L>  |  1.09    Ca    9.3      12 Jun 2018 04:28      Creatinine Trend: 1.09 <--, 1.11 <--, 1.07 <--, 1.16 <--, 1.16 <--, 1.22 <--, 1.31 <--                        12.9   8.52  )-----------( 192      ( 12 Jun 2018 04:28 )             39.5     Urine Studies:              PT/INR - ( 11 Jun 2018 06:12 )   PT: 13.0 SEC;   INR: 1.17          PTT - ( 11 Jun 2018 06:12 )  PTT:31.2 SEC
chief complaint : unsteady gait        SUBJECTIVE / OVERNIGHT EVENTS: pt sitting comfortably , denies chest pain, shortness of breath, nausea, vomiting    MEDICATIONS  (STANDING):  atorvastatin 80 milliGRAM(s) Oral at bedtime  cyanocobalamin 1000 MICROGram(s) Oral daily  dextrose 5% + sodium chloride 0.9%. 1000 milliLiter(s) (50 mL/Hr) IV Continuous <Continuous>  dextrose 5%. 1000 milliLiter(s) (50 mL/Hr) IV Continuous <Continuous>  dextrose 50% Injectable 12.5 Gram(s) IV Push once  dextrose 50% Injectable 25 Gram(s) IV Push once  dextrose 50% Injectable 25 Gram(s) IV Push once  donepezil 5 milliGRAM(s) Oral at bedtime  folic acid 1 milliGRAM(s) Oral daily  insulin lispro (HumaLOG) corrective regimen sliding scale   SubCutaneous three times a day before meals  insulin lispro (HumaLOG) corrective regimen sliding scale   SubCutaneous at bedtime  memantine 10 milliGRAM(s) Oral two times a day  metoprolol succinate  milliGRAM(s) Oral daily    MEDICATIONS  (PRN):  acetaminophen   Tablet. 650 milliGRAM(s) Oral every 6 hours PRN back pain  dextrose 40% Gel 15 Gram(s) Oral once PRN Blood Glucose LESS THAN 70 milliGRAM(s)/deciliter  glucagon  Injectable 1 milliGRAM(s) IntraMuscular once PRN Glucose LESS THAN 70 milligrams/deciliter    Vital Signs Last 24 Hrs  T(C): 36.6 (10 Jeremy 2018 13:24), Max: 36.6 (10 Jeremy 2018 13:24)  T(F): 97.9 (10 Jeremy 2018 13:24), Max: 97.9 (10 Jeremy 2018 13:24)  HR: 75 (10 Jeremy 2018 13:24) (68 - 75)  BP: 99/54 (10 Jeremy 2018 13:24) (99/54 - 129/71)  BP(mean): --  RR: 18 (10 Jeremy 2018 13:24) (18 - 18)  SpO2: 98% (10 Jeremy 2018 13:24) (98% - 100%)    Constitutional: No fever, fatigue  Skin: No rash.  Eyes: No recent vision problems or eye pain.  ENT: No congestion, ear pain, or sore throat.  Cardiovascular: No chest pain or palpation.  Respiratory: No cough, shortness of breath, congestion, or wheezing.  Gastrointestinal: No abdominal pain, nausea, vomiting, or diarrhea.  Genitourinary: No dysuria.  Musculoskeletal: No joint swelling.  Neurologic: No headache.    PHYSICAL EXAM:  GENERAL: NAD  EYES: EOMI, PERRLA  NECK: Supple, No JVD  CHEST/LUNG: dec breath sounds at bases   HEART:  S1 , S2 +  ABDOMEN: soft, bs+  EXTREMITIES: no edema  NEUROLOGY:alert awake calm cooperative    LABS:  06-10    133<L>  |  103  |  14  ----------------------------<  172<H>  4.0   |  17<L>  |  1.07    Ca    9.4      10 Jeremy 2018 05:12      Creatinine Trend: 1.07 <--, 1.16 <--, 1.16 <--, 1.22 <--, 1.31 <--, 1.34 <--                        13.9   6.75  )-----------( 189      ( 10 Jeremy 2018 10:13 )             43.4     Urine Studies:              PT/INR - ( 10 Jeremy 2018 05:12 )   PT: 12.9 SEC;   INR: 1.12          PTT - ( 10 Jeremy 2018 05:12 )  PTT:29.5 SEC
chief complaint : unsteady gait        SUBJECTIVE / OVERNIGHT EVENTS: pt sitting comfortably , denies chest pain, shortness of breath, nausea, vomiting, pts family next to pts bed    MEDICATIONS  (STANDING):  aspirin enteric coated 81 milliGRAM(s) Oral daily  atorvastatin 80 milliGRAM(s) Oral at bedtime  cyanocobalamin 1000 MICROGram(s) Oral daily  dextrose 5%. 1000 milliLiter(s) (50 mL/Hr) IV Continuous <Continuous>  dextrose 50% Injectable 12.5 Gram(s) IV Push once  dextrose 50% Injectable 25 Gram(s) IV Push once  dextrose 50% Injectable 25 Gram(s) IV Push once  donepezil 5 milliGRAM(s) Oral at bedtime  folic acid 1 milliGRAM(s) Oral daily  insulin lispro (HumaLOG) corrective regimen sliding scale   SubCutaneous three times a day before meals  insulin lispro (HumaLOG) corrective regimen sliding scale   SubCutaneous at bedtime  metoprolol succinate  milliGRAM(s) Oral daily  sodium chloride 0.9%. 1000 milliLiter(s) (75 mL/Hr) IV Continuous <Continuous>    MEDICATIONS  (PRN):  acetaminophen   Tablet. 650 milliGRAM(s) Oral every 6 hours PRN back pain  dextrose 40% Gel 15 Gram(s) Oral once PRN Blood Glucose LESS THAN 70 milliGRAM(s)/deciliter  glucagon  Injectable 1 milliGRAM(s) IntraMuscular once PRN Glucose LESS THAN 70 milligrams/deciliter      Vital Signs Last 24 Hrs  T(C): 36.7 (2018 19:22), Max: 37 (2018 22:16)  T(F): 98.1 (2018 19:22), Max: 98.6 (2018 22:16)  HR: 65 (2018 19:22) (65 - 81)  BP: 106/59 (2018 19:22) (91/57 - 134/61)  BP(mean): --  RR: 18 (2018 19:22) (18 - 20)  SpO2: 100% (2018 19:22) (100% - 100%)    Constitutional: No fever, fatigue  Skin: No rash.  Eyes: No recent vision problems or eye pain.  ENT: No congestion, ear pain, or sore throat.  Cardiovascular: No chest pain or palpation.  Respiratory: No cough, shortness of breath, congestion, or wheezing.  Gastrointestinal: No abdominal pain, nausea, vomiting, or diarrhea.  Genitourinary: No dysuria.  Musculoskeletal: No joint swelling.  Neurologic: No headache.    PHYSICAL EXAM:  GENERAL: NAD  EYES: EOMI, PERRLA  NECK: Supple, No JVD  CHEST/LUNG: dec breath sounds at bases   HEART:  S1 , S2 +  ABDOMEN: soft, bs+  EXTREMITIES: no edema  NEUROLOGY:alert awake calm cooperative    LABS:      137  |  103  |  14  ----------------------------<  135<H>  4.3   |  23  |  1.37<H>    Ca    10.3      2018 06:00  Phos  3.2       Mg     1.8         TPro  8.6<H>  /  Alb      /  TBili      /  DBili      /  AST      /  ALT      /  AlkPhos          Creatinine Trend: 1.37 <--, 1.46 <--, 1.80 <--, 2.29 <--                        14.6   9.95  )-----------( 123      ( 2018 06:10 )             44.9     Urine Studies:  Urinalysis Basic - ( 31 May 2018 22:45 )    Color: PLYEL / Appearance: CLEAR / S.010 / pH: 5.5  Gluc: 50 / Ketone: NEGATIVE  / Bili: NEGATIVE / Urobili: NORMAL mg/dL   Blood: NEGATIVE / Protein: NEGATIVE mg/dL / Nitrite: NEGATIVE   Leuk Esterase: NEGATIVE / RBC: 0-2 / WBC 2-5   Sq Epi: OCC / Non Sq Epi:  / Bacteria:       Protein, Random Urine: 16.5 mg/dL ( @ 05:50)  Creatinine, Random Urine: 58.09 mg/dL ( @ 22:45)  Protein, Random Urine: 8.6 mg/dL ( @ 22:45)          LIVER FUNCTIONS - ( 2018 06:00 )  Alb: x     / Pro: 8.6 g/dL / ALK PHOS: x     / ALT: x     / AST: x     / GGT: x
chief complaint : unsteady gait        SUBJECTIVE / OVERNIGHT EVENTS: pt sitting comfortably , denies chest pain, shortness of breath, nausea, vomiting, pts family next to pts bed    MEDICATIONS  (STANDING):  atorvastatin 80 milliGRAM(s) Oral at bedtime  cyanocobalamin 1000 MICROGram(s) Oral daily  dextrose 5% + sodium chloride 0.9%. 1000 milliLiter(s) (50 mL/Hr) IV Continuous <Continuous>  dextrose 5%. 1000 milliLiter(s) (50 mL/Hr) IV Continuous <Continuous>  dextrose 50% Injectable 12.5 Gram(s) IV Push once  dextrose 50% Injectable 25 Gram(s) IV Push once  dextrose 50% Injectable 25 Gram(s) IV Push once  donepezil 5 milliGRAM(s) Oral at bedtime  folic acid 1 milliGRAM(s) Oral daily  insulin lispro (HumaLOG) corrective regimen sliding scale   SubCutaneous three times a day before meals  insulin lispro (HumaLOG) corrective regimen sliding scale   SubCutaneous at bedtime  memantine 10 milliGRAM(s) Oral two times a day  metoprolol succinate  milliGRAM(s) Oral daily    MEDICATIONS  (PRN):  acetaminophen   Tablet. 650 milliGRAM(s) Oral every 6 hours PRN back pain  dextrose 40% Gel 15 Gram(s) Oral once PRN Blood Glucose LESS THAN 70 milliGRAM(s)/deciliter  glucagon  Injectable 1 milliGRAM(s) IntraMuscular once PRN Glucose LESS THAN 70 milligrams/deciliter    Vital Signs Last 24 Hrs  T(C): 36.4 (08 Jun 2018 19:57), Max: 36.4 (08 Jun 2018 05:18)  T(F): 97.5 (08 Jun 2018 19:57), Max: 97.5 (08 Jun 2018 05:18)  HR: 69 (08 Jun 2018 19:57) (60 - 88)  BP: 107/63 (08 Jun 2018 19:57) (107/63 - 139/70)  BP(mean): --  RR: 18 (08 Jun 2018 19:57) (18 - 18)  SpO2: 98% (08 Jun 2018 19:57) (97% - 99%)    Constitutional: No fever, fatigue  Skin: No rash.  Eyes: No recent vision problems or eye pain.  ENT: No congestion, ear pain, or sore throat.  Cardiovascular: No chest pain or palpation.  Respiratory: No cough, shortness of breath, congestion, or wheezing.  Gastrointestinal: No abdominal pain, nausea, vomiting, or diarrhea.  Genitourinary: No dysuria.  Musculoskeletal: No joint swelling.  Neurologic: No headache.    PHYSICAL EXAM:  GENERAL: NAD  EYES: EOMI, PERRLA  NECK: Supple, No JVD  CHEST/LUNG: dec breath sounds at bases   HEART:  S1 , S2 +  ABDOMEN: soft, bs+  EXTREMITIES: no edema  NEUROLOGY:alert awake calm cooperative    LABS:  06-08    136  |  102  |  18  ----------------------------<  154<H>  4.0   |  24  |  1.16    Ca    9.3      08 Jun 2018 06:00  Phos  3.6     06-08  Mg     1.8     06-08      Creatinine Trend: 1.16 <--, 1.22 <--, 1.31 <--, 1.34 <--, 1.37 <--, 1.37 <--                        13.6   9.00  )-----------( 155      ( 08 Jun 2018 06:00 )             41.0     Urine Studies:    Protein, Random Urine: 16.5 mg/dL (06-02 @ 05:50)            PT/INR - ( 08 Jun 2018 14:30 )   PT: 13.4 SEC;   INR: 1.20          PTT - ( 08 Jun 2018 14:30 )  PTT:155.0 SEC
chief complaint : unsteady gait        SUBJECTIVE / OVERNIGHT EVENTS: pt sitting comfortably , denies chest pain, shortness of breath, nausea, vomiting, pts family next to pts bed    MEDICATIONS  (STANDING):  atorvastatin 80 milliGRAM(s) Oral at bedtime  cyanocobalamin 1000 MICROGram(s) Oral daily  dextrose 5%. 1000 milliLiter(s) (50 mL/Hr) IV Continuous <Continuous>  dextrose 50% Injectable 12.5 Gram(s) IV Push once  dextrose 50% Injectable 25 Gram(s) IV Push once  dextrose 50% Injectable 25 Gram(s) IV Push once  donepezil 5 milliGRAM(s) Oral at bedtime  folic acid 1 milliGRAM(s) Oral daily  heparin  Injectable 5000 Unit(s) SubCutaneous every 8 hours  insulin lispro (HumaLOG) corrective regimen sliding scale   SubCutaneous three times a day before meals  insulin lispro (HumaLOG) corrective regimen sliding scale   SubCutaneous at bedtime  memantine 10 milliGRAM(s) Oral two times a day  metoprolol succinate  milliGRAM(s) Oral daily    MEDICATIONS  (PRN):  acetaminophen   Tablet. 650 milliGRAM(s) Oral every 6 hours PRN back pain  dextrose 40% Gel 15 Gram(s) Oral once PRN Blood Glucose LESS THAN 70 milliGRAM(s)/deciliter  glucagon  Injectable 1 milliGRAM(s) IntraMuscular once PRN Glucose LESS THAN 70 milligrams/deciliter    Vital Signs Last 24 Hrs  T(C): 36.3 (2018 19:35), Max: 36.6 (2018 05:14)  T(F): 97.4 (2018 19:35), Max: 97.9 (2018 05:14)  HR: 82 (2018 19:35) (74 - 82)  BP: 121/74 (2018 19:35) (101/660 - 121/74)  BP(mean): --  RR: 18 (2018 19:35) (18 - 18)  SpO2: 97% (2018 19:35) (97% - 100%)    Constitutional: No fever, fatigue  Skin: No rash.  Eyes: No recent vision problems or eye pain.  ENT: No congestion, ear pain, or sore throat.  Cardiovascular: No chest pain or palpation.  Respiratory: No cough, shortness of breath, congestion, or wheezing.  Gastrointestinal: No abdominal pain, nausea, vomiting, or diarrhea.  Genitourinary: No dysuria.  Musculoskeletal: No joint swelling.  Neurologic: No headache.    PHYSICAL EXAM:  GENERAL: NAD  EYES: EOMI, PERRLA  NECK: Supple, No JVD  CHEST/LUNG: dec breath sounds at bases   HEART:  S1 , S2 +  ABDOMEN: soft, bs+  EXTREMITIES: no edema  NEUROLOGY:alert awake calm cooperative    LABS:      135  |  104  |  20  ----------------------------<  132<H>  4.1   |  21<L>  |  1.22    Ca    9.2      2018 06:15  Phos  3.3       Mg     1.6           Creatinine Trend: 1.22 <--, 1.31 <--, 1.34 <--, 1.37 <--, 1.37 <--, 1.46 <--                        13.4   9.89  )-----------( 148      ( 2018 06:15 )             40.8     Urine Studies:  Urinalysis Basic - ( 31 May 2018 22:45 )    Color: PLYEL / Appearance: CLEAR / S.010 / pH: 5.5  Gluc: 50 / Ketone: NEGATIVE  / Bili: NEGATIVE / Urobili: NORMAL mg/dL   Blood: NEGATIVE / Protein: NEGATIVE mg/dL / Nitrite: NEGATIVE   Leuk Esterase: NEGATIVE / RBC: 0-2 / WBC 2-5   Sq Epi: OCC / Non Sq Epi:  / Bacteria:       Protein, Random Urine: 16.5 mg/dL ( @ 05:50)  Creatinine, Random Urine: 58.09 mg/dL ( @ 22:45)  Protein, Random Urine: 8.6 mg/dL ( @ 22:45)
chief complaint : unsteady gait        SUBJECTIVE / OVERNIGHT EVENTS: pt sitting comfortably , denies chest pain, shortness of breath, nausea, vomiting, pts family next to pts bed    MEDICATIONS  (STANDING):  atorvastatin 80 milliGRAM(s) Oral at bedtime  cyanocobalamin 1000 MICROGram(s) Oral daily  dextrose 5%. 1000 milliLiter(s) (50 mL/Hr) IV Continuous <Continuous>  dextrose 50% Injectable 12.5 Gram(s) IV Push once  dextrose 50% Injectable 25 Gram(s) IV Push once  dextrose 50% Injectable 25 Gram(s) IV Push once  donepezil 5 milliGRAM(s) Oral at bedtime  folic acid 1 milliGRAM(s) Oral daily  heparin  Injectable 5000 Unit(s) SubCutaneous every 8 hours  insulin lispro (HumaLOG) corrective regimen sliding scale   SubCutaneous three times a day before meals  insulin lispro (HumaLOG) corrective regimen sliding scale   SubCutaneous at bedtime  metoprolol succinate  milliGRAM(s) Oral daily  sodium chloride 0.9%. 1000 milliLiter(s) (75 mL/Hr) IV Continuous <Continuous>    MEDICATIONS  (PRN):  acetaminophen   Tablet. 650 milliGRAM(s) Oral every 6 hours PRN back pain  dextrose 40% Gel 15 Gram(s) Oral once PRN Blood Glucose LESS THAN 70 milliGRAM(s)/deciliter  glucagon  Injectable 1 milliGRAM(s) IntraMuscular once PRN Glucose LESS THAN 70 milligrams/deciliter    Vital Signs Last 24 Hrs  T(C): 36.3 (2018 13:20), Max: 36.6 (2018 05:51)  T(F): 97.4 (2018 13:20), Max: 97.9 (2018 05:51)  HR: 54 (2018 18:39) (54 - 61)  BP: 92/57 (2018 18:39) (92/57 - 127/66)  BP(mean): --  RR: 17 (2018 13:20) (17 - 18)  SpO2: 100% (2018 13:20) (98% - 100%)    Constitutional: No fever, fatigue  Skin: No rash.  Eyes: No recent vision problems or eye pain.  ENT: No congestion, ear pain, or sore throat.  Cardiovascular: No chest pain or palpation.  Respiratory: No cough, shortness of breath, congestion, or wheezing.  Gastrointestinal: No abdominal pain, nausea, vomiting, or diarrhea.  Genitourinary: No dysuria.  Musculoskeletal: No joint swelling.  Neurologic: No headache.    PHYSICAL EXAM:  GENERAL: NAD  EYES: EOMI, PERRLA  NECK: Supple, No JVD  CHEST/LUNG: dec breath sounds at bases   HEART:  S1 , S2 +  ABDOMEN: soft, bs+  EXTREMITIES: no edema  NEUROLOGY:alert awake calm cooperative    LABS:      136  |  104  |  17  ----------------------------<  137<H>  4.2   |  21<L>  |  1.34<H>    Ca    9.1      2018 05:20    TPro  8.2  /  Alb      /  TBili      /  DBili      /  AST      /  ALT      /  AlkPhos          Creatinine Trend: 1.34 <--, 1.37 <--, 1.37 <--, 1.46 <--, 1.80 <--, 2.29 <--                        15.1   8.87  )-----------( 132      ( 2018 05:20 )             45.7     Urine Studies:  Urinalysis Basic - ( 31 May 2018 22:45 )    Color: PLYEL / Appearance: CLEAR / S.010 / pH: 5.5  Gluc: 50 / Ketone: NEGATIVE  / Bili: NEGATIVE / Urobili: NORMAL mg/dL   Blood: NEGATIVE / Protein: NEGATIVE mg/dL / Nitrite: NEGATIVE   Leuk Esterase: NEGATIVE / RBC: 0-2 / WBC 2-5   Sq Epi: OCC / Non Sq Epi:  / Bacteria:       Protein, Random Urine: 16.5 mg/dL ( @ 05:50)  Creatinine, Random Urine: 58.09 mg/dL ( @ 22:45)  Protein, Random Urine: 8.6 mg/dL ( @ 22:45)          LIVER FUNCTIONS - ( 2018 17:15 )  Alb: x     / Pro: 8.2 g/dL / ALK PHOS: x     / ALT: x     / AST: x     / GGT: x           PT/INR - ( 2018 12:00 )   PT: 13.4 SEC;   INR: 1.20
chief complaint : unsteady gait        SUBJECTIVE / OVERNIGHT EVENTS: pt sitting comfortably , denies chest pain, shortness of breath, nausea, vomiting    MEDICATIONS  (STANDING):  atorvastatin 80 milliGRAM(s) Oral at bedtime  cyanocobalamin 1000 MICROGram(s) Oral daily  dextrose 5% + sodium chloride 0.9%. 1000 milliLiter(s) (50 mL/Hr) IV Continuous <Continuous>  dextrose 5%. 1000 milliLiter(s) (50 mL/Hr) IV Continuous <Continuous>  dextrose 50% Injectable 12.5 Gram(s) IV Push once  dextrose 50% Injectable 25 Gram(s) IV Push once  dextrose 50% Injectable 25 Gram(s) IV Push once  donepezil 5 milliGRAM(s) Oral at bedtime  folic acid 1 milliGRAM(s) Oral daily  insulin lispro (HumaLOG) corrective regimen sliding scale   SubCutaneous three times a day before meals  insulin lispro (HumaLOG) corrective regimen sliding scale   SubCutaneous at bedtime  memantine 10 milliGRAM(s) Oral two times a day  metoprolol succinate  milliGRAM(s) Oral daily    MEDICATIONS  (PRN):  acetaminophen   Tablet. 650 milliGRAM(s) Oral every 6 hours PRN back pain  dextrose 40% Gel 15 Gram(s) Oral once PRN Blood Glucose LESS THAN 70 milliGRAM(s)/deciliter  glucagon  Injectable 1 milliGRAM(s) IntraMuscular once PRN Glucose LESS THAN 70 milligrams/deciliter    Vital Signs Last 24 Hrs  T(C): 37 (09 Jun 2018 19:37), Max: 37 (09 Jun 2018 19:37)  T(F): 98.6 (09 Jun 2018 19:37), Max: 98.6 (09 Jun 2018 19:37)  HR: 60 (09 Jun 2018 19:37) (59 - 67)  BP: 117/72 (09 Jun 2018 19:37) (117/72 - 129/66)  BP(mean): --  RR: 20 (09 Jun 2018 19:37) (18 - 20)  SpO2: 100% (09 Jun 2018 19:37) (98% - 100%)    Constitutional: No fever, fatigue  Skin: No rash.  Eyes: No recent vision problems or eye pain.  ENT: No congestion, ear pain, or sore throat.  Cardiovascular: No chest pain or palpation.  Respiratory: No cough, shortness of breath, congestion, or wheezing.  Gastrointestinal: No abdominal pain, nausea, vomiting, or diarrhea.  Genitourinary: No dysuria.  Musculoskeletal: No joint swelling.  Neurologic: No headache.    PHYSICAL EXAM:  GENERAL: NAD  EYES: EOMI, PERRLA  NECK: Supple, No JVD  CHEST/LUNG: dec breath sounds at bases   HEART:  S1 , S2 +  ABDOMEN: soft, bs+  EXTREMITIES: no edema  NEUROLOGY:alert awake calm cooperative    LABS:  06-09    137  |  103  |  16  ----------------------------<  163<H>  3.6   |  25  |  1.16    Ca    9.2      09 Jun 2018 05:57  Phos  3.6     06-08  Mg     1.8     06-08      Creatinine Trend: 1.16 <--, 1.16 <--, 1.22 <--, 1.31 <--, 1.34 <--, 1.37 <--                        13.0   10.32 )-----------( 166      ( 09 Jun 2018 05:57 )             40.7     Urine Studies:              PT/INR - ( 09 Jun 2018 05:57 )   PT: 13.6 SEC;   INR: 1.22          PTT - ( 09 Jun 2018 05:57 )  PTT:50.2 SEC
chief complaint : unsteady gait        SUBJECTIVE / OVERNIGHT EVENTS: pt sitting comfortably , denies chest pain, shortness of breath, nausea, vomiting    MEDICATIONS  (STANDING):  atorvastatin 80 milliGRAM(s) Oral at bedtime  cyanocobalamin 1000 MICROGram(s) Oral daily  dextrose 5% + sodium chloride 0.9%. 1000 milliLiter(s) (50 mL/Hr) IV Continuous <Continuous>  dextrose 5%. 1000 milliLiter(s) (50 mL/Hr) IV Continuous <Continuous>  dextrose 50% Injectable 12.5 Gram(s) IV Push once  dextrose 50% Injectable 25 Gram(s) IV Push once  dextrose 50% Injectable 25 Gram(s) IV Push once  donepezil 5 milliGRAM(s) Oral at bedtime  folic acid 1 milliGRAM(s) Oral daily  insulin lispro (HumaLOG) corrective regimen sliding scale   SubCutaneous three times a day before meals  insulin lispro (HumaLOG) corrective regimen sliding scale   SubCutaneous at bedtime  memantine 10 milliGRAM(s) Oral two times a day  metoprolol succinate  milliGRAM(s) Oral daily    MEDICATIONS  (PRN):  acetaminophen   Tablet. 650 milliGRAM(s) Oral every 6 hours PRN back pain  dextrose 40% Gel 15 Gram(s) Oral once PRN Blood Glucose LESS THAN 70 milliGRAM(s)/deciliter  glucagon  Injectable 1 milliGRAM(s) IntraMuscular once PRN Glucose LESS THAN 70 milligrams/deciliter  melatonin 3 milliGRAM(s) Oral at bedtime PRN Insomnia    Vital Signs Last 24 Hrs  T(C): 36.3 (11 Jun 2018 12:20), Max: 36.3 (11 Jun 2018 05:36)  T(F): 97.4 (11 Jun 2018 12:20), Max: 97.4 (11 Jun 2018 05:36)  HR: 60 (11 Jun 2018 12:20) (60 - 64)  BP: 124/52 (11 Jun 2018 12:20) (124/52 - 128/55)  BP(mean): --  RR: 18 (11 Jun 2018 12:20) (18 - 18)  SpO2: 100% (11 Jun 2018 12:20) (96% - 100%)    Constitutional: No fever, fatigue  Skin: No rash.  Eyes: No recent vision problems or eye pain.  ENT: No congestion, ear pain, or sore throat.  Cardiovascular: No chest pain or palpation.  Respiratory: No cough, shortness of breath, congestion, or wheezing.  Gastrointestinal: No abdominal pain, nausea, vomiting, or diarrhea.  Genitourinary: No dysuria.  Musculoskeletal: No joint swelling.  Neurologic: No headache.    PHYSICAL EXAM:  GENERAL: NAD  EYES: EOMI, PERRLA  NECK: Supple, No JVD  CHEST/LUNG: dec breath sounds at bases   HEART:  S1 , S2 +  ABDOMEN: soft, bs+  EXTREMITIES: no edema  NEUROLOGY:alert awake calm cooperative    LABS:  06-11    138  |  105  |  15  ----------------------------<  171<H>  4.5   |  22  |  1.11    Ca    9.8      11 Jun 2018 06:12      Creatinine Trend: 1.11 <--, 1.07 <--, 1.16 <--, 1.16 <--, 1.22 <--, 1.31 <--                        14.5   9.27  )-----------( 185      ( 11 Jun 2018 06:12 )             44.0     Urine Studies:              PT/INR - ( 11 Jun 2018 06:12 )   PT: 13.0 SEC;   INR: 1.17          PTT - ( 11 Jun 2018 06:12 )  PTT:31.2 SEC
chief complaint : unsteady gait        SUBJECTIVE / OVERNIGHT EVENTS: pt sitting comfortably , denies chest pain, shortness of breath, nausea, v      MEDICATIONS  (STANDING):  aspirin enteric coated 81 milliGRAM(s) Oral daily  atorvastatin 80 milliGRAM(s) Oral at bedtime  cyanocobalamin 1000 MICROGram(s) Oral daily  dextrose 5%. 1000 milliLiter(s) (50 mL/Hr) IV Continuous <Continuous>  dextrose 50% Injectable 12.5 Gram(s) IV Push once  dextrose 50% Injectable 25 Gram(s) IV Push once  dextrose 50% Injectable 25 Gram(s) IV Push once  donepezil 5 milliGRAM(s) Oral at bedtime  folic acid 1 milliGRAM(s) Oral daily  insulin lispro (HumaLOG) corrective regimen sliding scale   SubCutaneous three times a day before meals  insulin lispro (HumaLOG) corrective regimen sliding scale   SubCutaneous at bedtime  metoprolol succinate  milliGRAM(s) Oral daily  sodium chloride 0.9%. 1000 milliLiter(s) (75 mL/Hr) IV Continuous <Continuous>    MEDICATIONS  (PRN):  acetaminophen   Tablet. 650 milliGRAM(s) Oral every 6 hours PRN back pain  dextrose 40% Gel 15 Gram(s) Oral once PRN Blood Glucose LESS THAN 70 milliGRAM(s)/deciliter  glucagon  Injectable 1 milliGRAM(s) IntraMuscular once PRN Glucose LESS THAN 70 milligrams/deciliter        CAPILLARY BLOOD GLUCOSE      POCT Blood Glucose.: 119 mg/dL (31 May 2018 22:28)  POCT Blood Glucose.: 153 mg/dL (31 May 2018 17:28)  POCT Blood Glucose.: 199 mg/dL (31 May 2018 11:43)    I&O's Summary  Vital Signs Last 24 Hrs  T(C): 36.4 (31 May 2018 19:23), Max: 36.6 (31 May 2018 13:34)  T(F): 97.6 (31 May 2018 19:23), Max: 97.8 (31 May 2018 13:34)  HR: 74 (31 May 2018 19:23) (53 - 74)  BP: 116/64 (31 May 2018 19:23) (107/65 - 128/72)  BP(mean): --  RR: 20 (31 May 2018 19:23) (18 - 20)  SpO2: 100% (31 May 2018 19:23) (99% - 100%)    Constitutional: No fever, fatigue  Skin: No rash.  Eyes: No recent vision problems or eye pain.  ENT: No congestion, ear pain, or sore throat.  Cardiovascular: No chest pain or palpation.  Respiratory: No cough, shortness of breath, congestion, or wheezing.  Gastrointestinal: No abdominal pain, nausea, vomiting, or diarrhea.  Genitourinary: No dysuria.  Musculoskeletal: No joint swelling.  Neurologic: No headache.    PHYSICAL EXAM:  GENERAL: NAD  EYES: EOMI, PERRLA  NECK: Supple, No JVD  CHEST/LUNG: dec breath sounds at bases   HEART:  S1 , S2 +  ABDOMEN: soft, bs+  EXTREMITIES: no edema  NEUROLOGY:alert awake calm cooperative      LABS:                        13.8   7.55  )-----------( 104      ( 31 May 2018 05:30 )             42.5     05-31    136  |  104  |  24<H>  ----------------------------<  142<H>  3.7   |  22  |  1.80<H>    Ca    9.3      31 May 2018 05:30  Phos  3.6     05-31  Mg     1.9     05-31    TPro  7.5  /  Alb  2.8<L>  /  TBili  0.8  /  DBili  x   /  AST  29  /  ALT  17  /  AlkPhos  100  05-31              RADIOLOGY & ADDITIONAL TESTS:    Imaging Personally Reviewed:    Consultant(s) Notes Reviewed:      Care Discussed with Consultants/Other Providers:
Dr. Espnioza  Office [phone]  Cell [phone]  [facility] CALL  Cell [phone]      Patient is a 88y old  Male who presents with a chief complaint of Back pain, unsteady gait (30 May 2018 21:24)      Patient seen and examined at bedside. No chest pain/sob    VITALS:  T(F): 97.4 (06-11-18 @ 12:20), Max: 97.4 (06-11-18 @ 05:36)  HR: 60 (06-11-18 @ 12:20)  BP: 124/52 (06-11-18 @ 12:20)  RR: 18 (06-11-18 @ 12:20)  SpO2: 100% (06-11-18 @ 12:20)  Wt(kg): --    Height (cm): 160 (06-11 @ 10:07)  Weight (kg): 53.4 (06-11 @ 10:07)  BMI (kg/m2): 20.9 (06-11 @ 10:07)  BSA (m2): 1.54 (06-11 @ 10:07)    PHYSICAL EXAM:  Constitutional: NAD  Neck: No JVD  Respiratory: CTAB, no wheezes, rales or rhonchi  Cardiovascular: S1, S2, RRR  Gastrointestinal: BS+, soft, NT/ND  Extremities: No peripheral edema    Hospital Medications:   MEDICATIONS  (STANDING):  atorvastatin 80 milliGRAM(s) Oral at bedtime  cyanocobalamin 1000 MICROGram(s) Oral daily  dextrose 5% + sodium chloride 0.9%. 1000 milliLiter(s) (50 mL/Hr) IV Continuous <Continuous>  dextrose 5%. 1000 milliLiter(s) (50 mL/Hr) IV Continuous <Continuous>  dextrose 50% Injectable 12.5 Gram(s) IV Push once  dextrose 50% Injectable 25 Gram(s) IV Push once  dextrose 50% Injectable 25 Gram(s) IV Push once  donepezil 5 milliGRAM(s) Oral at bedtime  folic acid 1 milliGRAM(s) Oral daily  insulin lispro (HumaLOG) corrective regimen sliding scale   SubCutaneous three times a day before meals  insulin lispro (HumaLOG) corrective regimen sliding scale   SubCutaneous at bedtime  memantine 10 milliGRAM(s) Oral two times a day  metoprolol succinate  milliGRAM(s) Oral daily      LABS:  06-11    138  |  105  |  15  ----------------------------<  171<H>  4.5   |  22  |  1.11    Ca    9.8      11 Jun 2018 06:12      Creatinine Trend: 1.11 <--, 1.07 <--, 1.16 <--, 1.16 <--, 1.22 <--, 1.31 <--                                14.5   9.27  )-----------( 185      ( 11 Jun 2018 06:12 )             44.0     Urine Studies:  Urinalysis - [05-31-18 @ 22:45]      Color PLYEL / Appearance CLEAR / SG 1.010 / pH 5.5      Gluc 50 / Ketone NEGATIVE  / Bili NEGATIVE / Urobili NORMAL       Blood NEGATIVE / Protein NEGATIVE / Leuk Est NEGATIVE / Nitrite NEGATIVE      RBC 0-2 / WBC 2-5 / Hyaline  / Gran  / Sq Epi OCC / Non Sq Epi  / Bacteria       PTH 5.92 (Ca --)      [06-01-18 @ 06:10]   --  Vitamin D (25OH) 42.8      [06-01-18 @ 06:10]  HbA1c 7.8      [06-01-18 @ 06:10]  TSH 1.18      [06-04-18 @ 05:20]      Free Light Chains: kappa 10.43, lambda 1.67, ratio = 6.25 H      [06-02 @ 06:00]    RADIOLOGY & ADDITIONAL STUDIES:
chief complaint : unsteady gait        SUBJECTIVE / OVERNIGHT EVENTS: pt sitting comfortably , denies chest pain, shortness of breath, nausea, vomiting, pts family next to pts bed    MEDICATIONS  (STANDING):  atorvastatin 80 milliGRAM(s) Oral at bedtime  cyanocobalamin 1000 MICROGram(s) Oral daily  dextrose 5%. 1000 milliLiter(s) (50 mL/Hr) IV Continuous <Continuous>  dextrose 50% Injectable 12.5 Gram(s) IV Push once  dextrose 50% Injectable 25 Gram(s) IV Push once  dextrose 50% Injectable 25 Gram(s) IV Push once  donepezil 5 milliGRAM(s) Oral at bedtime  folic acid 1 milliGRAM(s) Oral daily  heparin  Injectable 5000 Unit(s) SubCutaneous every 8 hours  insulin lispro (HumaLOG) corrective regimen sliding scale   SubCutaneous three times a day before meals  insulin lispro (HumaLOG) corrective regimen sliding scale   SubCutaneous at bedtime  metoprolol succinate  milliGRAM(s) Oral daily    MEDICATIONS  (PRN):  acetaminophen   Tablet. 650 milliGRAM(s) Oral every 6 hours PRN back pain  dextrose 40% Gel 15 Gram(s) Oral once PRN Blood Glucose LESS THAN 70 milliGRAM(s)/deciliter  glucagon  Injectable 1 milliGRAM(s) IntraMuscular once PRN Glucose LESS THAN 70 milligrams/deciliter      Vital Signs Last 24 Hrs  T(C): 36.9 (2018 21:07), Max: 36.9 (2018 13:41)  T(F): 98.5 (2018 21:07), Max: 98.5 (2018 13:41)  HR: 66 (2018 21:07) (59 - 66)  BP: 112/50 (2018 21:07) (112/50 - 135/61)  BP(mean): --  RR: 18 (2018 21:07) (18 - 18)  SpO2: 97% (2018 21:07) (97% - 100%)    Constitutional: No fever, fatigue  Skin: No rash.  Eyes: No recent vision problems or eye pain.  ENT: No congestion, ear pain, or sore throat.  Cardiovascular: No chest pain or palpation.  Respiratory: No cough, shortness of breath, congestion, or wheezing.  Gastrointestinal: No abdominal pain, nausea, vomiting, or diarrhea.  Genitourinary: No dysuria.  Musculoskeletal: No joint swelling.  Neurologic: No headache.    PHYSICAL EXAM:  GENERAL: NAD  EYES: EOMI, PERRLA  NECK: Supple, No JVD  CHEST/LUNG: dec breath sounds at bases   HEART:  S1 , S2 +  ABDOMEN: soft, bs+  EXTREMITIES: no edema  NEUROLOGY:alert awake calm cooperative    LABS:      136  |  104  |  17  ----------------------------<  137<H>  4.2   |  21<L>  |  1.34<H>    Ca    9.1      2018 05:20    TPro  8.2  /  Alb      /  TBili      /  DBili      /  AST      /  ALT      /  AlkPhos          Creatinine Trend: 1.34 <--, 1.37 <--, 1.37 <--, 1.46 <--, 1.80 <--, 2.29 <--                        15.1   8.87  )-----------( 132      ( 2018 05:20 )             45.7     Urine Studies:  Urinalysis Basic - ( 31 May 2018 22:45 )    Color: PLYEL / Appearance: CLEAR / S.010 / pH: 5.5  Gluc: 50 / Ketone: NEGATIVE  / Bili: NEGATIVE / Urobili: NORMAL mg/dL   Blood: NEGATIVE / Protein: NEGATIVE mg/dL / Nitrite: NEGATIVE   Leuk Esterase: NEGATIVE / RBC: 0-2 / WBC 2-5   Sq Epi: OCC / Non Sq Epi:  / Bacteria:       Protein, Random Urine: 16.5 mg/dL ( @ 05:50)  Creatinine, Random Urine: 58.09 mg/dL ( @ 22:45)  Protein, Random Urine: 8.6 mg/dL ( @ 22:45)          LIVER FUNCTIONS - ( 2018 17:15 )  Alb: x     / Pro: 8.2 g/dL / ALK PHOS: x     / ALT: x     / AST: x     / GGT: x           PT/INR - ( 2018 12:00 )   PT: 13.4 SEC;   INR: 1.20
chief complaint : unsteady gait        SUBJECTIVE / OVERNIGHT EVENTS: pt sitting comfortably , denies chest pain, shortness of breath, nausea, vomiting, pts family next to pts bed    MEDICATIONS  (STANDING):  atorvastatin 80 milliGRAM(s) Oral at bedtime  cyanocobalamin 1000 MICROGram(s) Oral daily  dextrose 5%. 1000 milliLiter(s) (50 mL/Hr) IV Continuous <Continuous>  dextrose 50% Injectable 12.5 Gram(s) IV Push once  dextrose 50% Injectable 25 Gram(s) IV Push once  dextrose 50% Injectable 25 Gram(s) IV Push once  donepezil 5 milliGRAM(s) Oral at bedtime  folic acid 1 milliGRAM(s) Oral daily  heparin  Injectable 5000 Unit(s) SubCutaneous every 8 hours  insulin lispro (HumaLOG) corrective regimen sliding scale   SubCutaneous three times a day before meals  insulin lispro (HumaLOG) corrective regimen sliding scale   SubCutaneous at bedtime  metoprolol succinate  milliGRAM(s) Oral daily    MEDICATIONS  (PRN):  acetaminophen   Tablet. 650 milliGRAM(s) Oral every 6 hours PRN back pain  dextrose 40% Gel 15 Gram(s) Oral once PRN Blood Glucose LESS THAN 70 milliGRAM(s)/deciliter  glucagon  Injectable 1 milliGRAM(s) IntraMuscular once PRN Glucose LESS THAN 70 milligrams/deciliter      Vital Signs Last 24 Hrs  T(C): 36.8 (2018 19:53), Max: 36.8 (2018 19:53)  T(F): 98.2 (2018 19:53), Max: 98.2 (2018 19:53)  HR: 75 (2018 19:53) (59 - 75)  BP: 100/60 (2018 19:53) (100/60 - 127/62)  BP(mean): --  RR: 19 (2018 19:53) (16 - 19)  SpO2: 100% (2018 19:53) (95% - 100%)    Constitutional: No fever, fatigue  Skin: No rash.  Eyes: No recent vision problems or eye pain.  ENT: No congestion, ear pain, or sore throat.  Cardiovascular: No chest pain or palpation.  Respiratory: No cough, shortness of breath, congestion, or wheezing.  Gastrointestinal: No abdominal pain, nausea, vomiting, or diarrhea.  Genitourinary: No dysuria.  Musculoskeletal: No joint swelling.  Neurologic: No headache.    PHYSICAL EXAM:  GENERAL: NAD  EYES: EOMI, PERRLA  NECK: Supple, No JVD  CHEST/LUNG: dec breath sounds at bases   HEART:  S1 , S2 +  ABDOMEN: soft, bs+  EXTREMITIES: no edema  NEUROLOGY:alert awake calm cooperative    LABS:      136  |  103  |  24<H>  ----------------------------<  126<H>  4.0   |  20<L>  |  1.31<H>    Ca    9.1      2018 05:20      Creatinine Trend: 1.31 <--, 1.34 <--, 1.37 <--, 1.37 <--, 1.46 <--, 1.80 <--                        13.2   10.41 )-----------( 128      ( 2018 05:20 )             40.7     Urine Studies:  Urinalysis Basic - ( 31 May 2018 22:45 )    Color: PLYEL / Appearance: CLEAR / S.010 / pH: 5.5  Gluc: 50 / Ketone: NEGATIVE  / Bili: NEGATIVE / Urobili: NORMAL mg/dL   Blood: NEGATIVE / Protein: NEGATIVE mg/dL / Nitrite: NEGATIVE   Leuk Esterase: NEGATIVE / RBC: 0-2 / WBC 2-5   Sq Epi: OCC / Non Sq Epi:  / Bacteria:       Protein, Random Urine: 16.5 mg/dL ( @ 05:50)  Creatinine, Random Urine: 58.09 mg/dL ( @ 22:45)  Protein, Random Urine: 8.6 mg/dL ( @ 22:45)                  LIVER FUNCTIONS - ( 2018 17:15 )  Alb: x     / Pro: 8.2 g/dL / ALK PHOS: x     / ALT: x     / AST: x     / GGT: x           PT/INR - ( 2018 12:00 )   PT: 13.4 SEC;   INR: 1.20
chief complaint : unsteady gait        SUBJECTIVE / OVERNIGHT EVENTS: pt sitting comfortably , denies chest pain, shortness of breath, nausea, vomiting, pts family next to pts bed    MEDICATIONS  (STANDING):  aspirin enteric coated 81 milliGRAM(s) Oral daily  atorvastatin 80 milliGRAM(s) Oral at bedtime  cyanocobalamin 1000 MICROGram(s) Oral daily  dextrose 5%. 1000 milliLiter(s) (50 mL/Hr) IV Continuous <Continuous>  dextrose 50% Injectable 12.5 Gram(s) IV Push once  dextrose 50% Injectable 25 Gram(s) IV Push once  dextrose 50% Injectable 25 Gram(s) IV Push once  donepezil 5 milliGRAM(s) Oral at bedtime  folic acid 1 milliGRAM(s) Oral daily  insulin lispro (HumaLOG) corrective regimen sliding scale   SubCutaneous three times a day before meals  insulin lispro (HumaLOG) corrective regimen sliding scale   SubCutaneous at bedtime  metoprolol succinate  milliGRAM(s) Oral daily  sodium chloride 0.9%. 1000 milliLiter(s) (75 mL/Hr) IV Continuous <Continuous>    MEDICATIONS  (PRN):  acetaminophen   Tablet. 650 milliGRAM(s) Oral every 6 hours PRN back pain  dextrose 40% Gel 15 Gram(s) Oral once PRN Blood Glucose LESS THAN 70 milliGRAM(s)/deciliter  glucagon  Injectable 1 milliGRAM(s) IntraMuscular once PRN Glucose LESS THAN 70 milligrams/deciliter    Vital Signs Last 24 Hrs  T(C): 36.6 (2018 13:54), Max: 36.7 (2018 19:22)  T(F): 97.8 (2018 13:54), Max: 98.1 (2018 19:22)  HR: 73 (2018 13:54) (65 - 75)  BP: 119/69 (2018 13:54) (106/59 - 119/69)  BP(mean): --  RR: 18 (2018 13:54) (18 - 18)  SpO2: 97% (2018 13:54) (97% - 100%)    Constitutional: No fever, fatigue  Skin: No rash.  Eyes: No recent vision problems or eye pain.  ENT: No congestion, ear pain, or sore throat.  Cardiovascular: No chest pain or palpation.  Respiratory: No cough, shortness of breath, congestion, or wheezing.  Gastrointestinal: No abdominal pain, nausea, vomiting, or diarrhea.  Genitourinary: No dysuria.  Musculoskeletal: No joint swelling.  Neurologic: No headache.    PHYSICAL EXAM:  GENERAL: NAD  EYES: EOMI, PERRLA  NECK: Supple, No JVD  CHEST/LUNG: dec breath sounds at bases   HEART:  S1 , S2 +  ABDOMEN: soft, bs+  EXTREMITIES: no edema  NEUROLOGY:alert awake calm cooperative    LABS:      135  |  105  |  19  ----------------------------<  132<H>  4.2   |  22  |  1.37<H>    Ca    9.5      2018 05:35    TPro  8.6<H>  /  Alb      /  TBili      /  DBili      /  AST      /  ALT      /  AlkPhos          Creatinine Trend: 1.37 <--, 1.37 <--, 1.46 <--, 1.80 <--, 2.29 <--    Urine Studies:  Urinalysis Basic - ( 31 May 2018 22:45 )    Color: PLYEL / Appearance: CLEAR / S.010 / pH: 5.5  Gluc: 50 / Ketone: NEGATIVE  / Bili: NEGATIVE / Urobili: NORMAL mg/dL   Blood: NEGATIVE / Protein: NEGATIVE mg/dL / Nitrite: NEGATIVE   Leuk Esterase: NEGATIVE / RBC: 0-2 / WBC 2-5   Sq Epi: OCC / Non Sq Epi:  / Bacteria:       Protein, Random Urine: 16.5 mg/dL ( @ 05:50)  Creatinine, Random Urine: 58.09 mg/dL ( @ 22:45)  Protein, Random Urine: 8.6 mg/dL ( @ 22:45)          LIVER FUNCTIONS - ( 2018 06:00 )  Alb: x     / Pro: 8.6 g/dL / ALK PHOS: x     / ALT: x     / AST: x     / GGT: x

## 2018-06-13 NOTE — PROGRESS NOTE ADULT - PROBLEM/PLAN-2
DISPLAY PLAN FREE TEXT
daughter(works), grandson(can assist at times throughout the day)
DISPLAY PLAN FREE TEXT

## 2018-06-15 LAB — NON-GYNECOLOGICAL CYTOLOGY STUDY: SIGNIFICANT CHANGE UP

## 2018-06-29 ENCOUNTER — OUTPATIENT (OUTPATIENT)
Dept: OUTPATIENT SERVICES | Facility: HOSPITAL | Age: 83
LOS: 1 days | Discharge: HOME | End: 2018-06-29

## 2018-06-29 DIAGNOSIS — Z95.1 PRESENCE OF AORTOCORONARY BYPASS GRAFT: Chronic | ICD-10-CM

## 2018-06-29 DIAGNOSIS — C90.00 MULTIPLE MYELOMA NOT HAVING ACHIEVED REMISSION: ICD-10-CM

## 2018-07-06 ENCOUNTER — APPOINTMENT (OUTPATIENT)
Dept: HEMATOLOGY ONCOLOGY | Facility: CLINIC | Age: 83
End: 2018-07-06

## 2018-07-06 ENCOUNTER — APPOINTMENT (OUTPATIENT)
Dept: HEMATOLOGY ONCOLOGY | Facility: CLINIC | Age: 83
End: 2018-07-06
Payer: MEDICARE

## 2018-07-06 VITALS
RESPIRATION RATE: 16 BRPM | OXYGEN SATURATION: 98 % | TEMPERATURE: 97.9 F | BODY MASS INDEX: 19.47 KG/M2 | HEART RATE: 60 BPM | HEIGHT: 61.89 IN | SYSTOLIC BLOOD PRESSURE: 96 MMHG | DIASTOLIC BLOOD PRESSURE: 60 MMHG | WEIGHT: 105.82 LBS

## 2018-07-06 DIAGNOSIS — R26.81 UNSTEADINESS ON FEET: ICD-10-CM

## 2018-07-06 DIAGNOSIS — I10 ESSENTIAL (PRIMARY) HYPERTENSION: ICD-10-CM

## 2018-07-06 DIAGNOSIS — C90.00 MULTIPLE MYELOMA NOT HAVING ACHIEVED REMISSION: ICD-10-CM

## 2018-07-06 DIAGNOSIS — Z83.3 FAMILY HISTORY OF DIABETES MELLITUS: ICD-10-CM

## 2018-07-06 DIAGNOSIS — G30.9 ALZHEIMER'S DISEASE, UNSPECIFIED: ICD-10-CM

## 2018-07-06 DIAGNOSIS — E11.59 TYPE 2 DIABETES MELLITUS WITH OTHER CIRCULATORY COMPLICATIONS: ICD-10-CM

## 2018-07-06 DIAGNOSIS — I25.10 ATHEROSCLEROTIC HEART DISEASE OF NATIVE CORONARY ARTERY W/OUT ANGINA PECTORIS: ICD-10-CM

## 2018-07-06 DIAGNOSIS — F02.80 ALZHEIMER'S DISEASE, UNSPECIFIED: ICD-10-CM

## 2018-07-06 DIAGNOSIS — N18.9 CHRONIC KIDNEY DISEASE, UNSPECIFIED: ICD-10-CM

## 2018-07-06 DIAGNOSIS — S06.5X9A TRAUMATIC SUBDURAL HEMORRHAGE WITH LOSS OF CONSCIOUSNESS OF UNSPECIFIED DURATION, INITIAL ENCOUNTER: ICD-10-CM

## 2018-07-06 PROCEDURE — 99215 OFFICE O/P EST HI 40 MIN: CPT

## 2018-07-06 RX ORDER — FOLIC ACID 1 MG/1
1 TABLET ORAL
Refills: 0 | Status: ACTIVE | COMMUNITY

## 2018-07-06 RX ORDER — SITAGLIPTIN 100 MG/1
100 TABLET, FILM COATED ORAL
Refills: 0 | Status: ACTIVE | COMMUNITY

## 2018-07-06 RX ORDER — LIDOCAINE 5% 700 MG/1
5 PATCH TOPICAL
Qty: 30 | Refills: 0 | Status: ACTIVE | COMMUNITY
Start: 2018-07-06 | End: 1900-01-01

## 2018-07-06 RX ORDER — CYANOCOBALAMIN (VITAMIN B-12) 1000 MCG
TABLET ORAL
Refills: 0 | Status: ACTIVE | COMMUNITY

## 2018-07-06 RX ORDER — DONEPEZIL HYDROCHLORIDE 10 MG/1
10 TABLET, FILM COATED ORAL
Refills: 0 | Status: ACTIVE | COMMUNITY

## 2018-07-06 RX ORDER — METOPROLOL TARTRATE 100 MG/1
100 TABLET, FILM COATED ORAL
Refills: 0 | Status: ACTIVE | COMMUNITY

## 2018-08-20 ENCOUNTER — INPATIENT (INPATIENT)
Facility: HOSPITAL | Age: 83
LOS: 7 days | Discharge: HOSPICE | End: 2018-08-28
Attending: INTERNAL MEDICINE | Admitting: INTERNAL MEDICINE
Payer: MEDICARE

## 2018-08-20 VITALS
SYSTOLIC BLOOD PRESSURE: 105 MMHG | RESPIRATION RATE: 22 BRPM | TEMPERATURE: 102 F | OXYGEN SATURATION: 96 % | HEART RATE: 156 BPM | DIASTOLIC BLOOD PRESSURE: 70 MMHG

## 2018-08-20 DIAGNOSIS — E87.2 ACIDOSIS: ICD-10-CM

## 2018-08-20 DIAGNOSIS — N17.9 ACUTE KIDNEY FAILURE, UNSPECIFIED: ICD-10-CM

## 2018-08-20 DIAGNOSIS — R53.81 OTHER MALAISE: ICD-10-CM

## 2018-08-20 DIAGNOSIS — R41.82 ALTERED MENTAL STATUS, UNSPECIFIED: ICD-10-CM

## 2018-08-20 DIAGNOSIS — A41.9 SEPSIS, UNSPECIFIED ORGANISM: ICD-10-CM

## 2018-08-20 DIAGNOSIS — Z95.1 PRESENCE OF AORTOCORONARY BYPASS GRAFT: Chronic | ICD-10-CM

## 2018-08-20 DIAGNOSIS — E83.52 HYPERCALCEMIA: ICD-10-CM

## 2018-08-20 DIAGNOSIS — I10 ESSENTIAL (PRIMARY) HYPERTENSION: ICD-10-CM

## 2018-08-20 DIAGNOSIS — N18.3 CHRONIC KIDNEY DISEASE, STAGE 3 (MODERATE): ICD-10-CM

## 2018-08-20 DIAGNOSIS — E87.1 HYPO-OSMOLALITY AND HYPONATREMIA: ICD-10-CM

## 2018-08-20 DIAGNOSIS — C90.00 MULTIPLE MYELOMA NOT HAVING ACHIEVED REMISSION: ICD-10-CM

## 2018-08-20 DIAGNOSIS — Z51.5 ENCOUNTER FOR PALLIATIVE CARE: ICD-10-CM

## 2018-08-20 DIAGNOSIS — R06.82 TACHYPNEA, NOT ELSEWHERE CLASSIFIED: ICD-10-CM

## 2018-08-20 DIAGNOSIS — E11.9 TYPE 2 DIABETES MELLITUS WITHOUT COMPLICATIONS: ICD-10-CM

## 2018-08-20 LAB
ALBUMIN SERPL ELPH-MCNC: 3.5 G/DL — SIGNIFICANT CHANGE UP (ref 3.3–5)
ALP SERPL-CCNC: 114 U/L — SIGNIFICANT CHANGE UP (ref 40–120)
ALT FLD-CCNC: 30 U/L — SIGNIFICANT CHANGE UP (ref 4–41)
APPEARANCE UR: SIGNIFICANT CHANGE UP
AST SERPL-CCNC: 54 U/L — HIGH (ref 4–40)
B PERT DNA SPEC QL NAA+PROBE: SIGNIFICANT CHANGE UP
BACTERIA # UR AUTO: SIGNIFICANT CHANGE UP
BASE EXCESS BLDV CALC-SCNC: -12.9 MMOL/L — SIGNIFICANT CHANGE UP
BASE EXCESS BLDV CALC-SCNC: -8.9 MMOL/L — SIGNIFICANT CHANGE UP
BASOPHILS # BLD AUTO: 0.02 K/UL — SIGNIFICANT CHANGE UP (ref 0–0.2)
BASOPHILS NFR BLD AUTO: 0.5 % — SIGNIFICANT CHANGE UP (ref 0–2)
BASOPHILS NFR SPEC: 0 % — SIGNIFICANT CHANGE UP (ref 0–2)
BILIRUB SERPL-MCNC: 1.3 MG/DL — HIGH (ref 0.2–1.2)
BILIRUB UR-MCNC: SIGNIFICANT CHANGE UP
BLASTS # FLD: 0 % — SIGNIFICANT CHANGE UP (ref 0–0)
BLOOD GAS VENOUS - CREATININE: 1.94 MG/DL — HIGH (ref 0.5–1.3)
BLOOD GAS VENOUS - CREATININE: 2.13 MG/DL — HIGH (ref 0.5–1.3)
BLOOD UR QL VISUAL: NEGATIVE — SIGNIFICANT CHANGE UP
BUN SERPL-MCNC: 37 MG/DL — HIGH (ref 7–23)
C PNEUM DNA SPEC QL NAA+PROBE: NOT DETECTED — SIGNIFICANT CHANGE UP
CALCIUM SERPL-MCNC: 11.5 MG/DL — HIGH (ref 8.4–10.5)
CHLORIDE BLDV-SCNC: 107 MMOL/L — SIGNIFICANT CHANGE UP (ref 96–108)
CHLORIDE BLDV-SCNC: 110 MMOL/L — HIGH (ref 96–108)
CHLORIDE SERPL-SCNC: 97 MMOL/L — LOW (ref 98–107)
CO2 SERPL-SCNC: 15 MMOL/L — LOW (ref 22–31)
COLOR SPEC: YELLOW — SIGNIFICANT CHANGE UP
CREAT SERPL-MCNC: 2.08 MG/DL — HIGH (ref 0.5–1.3)
EOSINOPHIL # BLD AUTO: 0 K/UL — SIGNIFICANT CHANGE UP (ref 0–0.5)
EOSINOPHIL NFR BLD AUTO: 0 % — SIGNIFICANT CHANGE UP (ref 0–6)
EOSINOPHIL NFR FLD: 0 % — SIGNIFICANT CHANGE UP (ref 0–6)
FLUAV H1 2009 PAND RNA SPEC QL NAA+PROBE: NOT DETECTED — SIGNIFICANT CHANGE UP
FLUAV H1 RNA SPEC QL NAA+PROBE: NOT DETECTED — SIGNIFICANT CHANGE UP
FLUAV H3 RNA SPEC QL NAA+PROBE: NOT DETECTED — SIGNIFICANT CHANGE UP
FLUAV SUBTYP SPEC NAA+PROBE: SIGNIFICANT CHANGE UP
FLUBV RNA SPEC QL NAA+PROBE: NOT DETECTED — SIGNIFICANT CHANGE UP
GAS PNL BLDV: 131 MMOL/L — LOW (ref 136–146)
GAS PNL BLDV: 133 MMOL/L — LOW (ref 136–146)
GIANT PLATELETS BLD QL SMEAR: PRESENT — SIGNIFICANT CHANGE UP
GLUCOSE BLDV-MCNC: 335 — HIGH (ref 70–99)
GLUCOSE BLDV-MCNC: 375 — HIGH (ref 70–99)
GLUCOSE SERPL-MCNC: 372 MG/DL — HIGH (ref 70–99)
GLUCOSE UR-MCNC: SIGNIFICANT CHANGE UP
HADV DNA SPEC QL NAA+PROBE: NOT DETECTED — SIGNIFICANT CHANGE UP
HCO3 BLDV-SCNC: 14 MMOL/L — LOW (ref 20–27)
HCO3 BLDV-SCNC: 15 MMOL/L — LOW (ref 20–27)
HCOV 229E RNA SPEC QL NAA+PROBE: NOT DETECTED — SIGNIFICANT CHANGE UP
HCOV HKU1 RNA SPEC QL NAA+PROBE: NOT DETECTED — SIGNIFICANT CHANGE UP
HCOV NL63 RNA SPEC QL NAA+PROBE: NOT DETECTED — SIGNIFICANT CHANGE UP
HCOV OC43 RNA SPEC QL NAA+PROBE: NOT DETECTED — SIGNIFICANT CHANGE UP
HCT VFR BLD CALC: 49.6 % — SIGNIFICANT CHANGE UP (ref 39–50)
HCT VFR BLDV CALC: 39.1 % — SIGNIFICANT CHANGE UP (ref 39–51)
HCT VFR BLDV CALC: 49.5 % — SIGNIFICANT CHANGE UP (ref 39–51)
HGB BLD-MCNC: 15.5 G/DL — SIGNIFICANT CHANGE UP (ref 13–17)
HGB BLDV-MCNC: 12.7 G/DL — LOW (ref 13–17)
HGB BLDV-MCNC: 16.2 G/DL — SIGNIFICANT CHANGE UP (ref 13–17)
HMPV RNA SPEC QL NAA+PROBE: NOT DETECTED — SIGNIFICANT CHANGE UP
HPIV1 RNA SPEC QL NAA+PROBE: NOT DETECTED — SIGNIFICANT CHANGE UP
HPIV2 RNA SPEC QL NAA+PROBE: NOT DETECTED — SIGNIFICANT CHANGE UP
HPIV3 RNA SPEC QL NAA+PROBE: NOT DETECTED — SIGNIFICANT CHANGE UP
HPIV4 RNA SPEC QL NAA+PROBE: NOT DETECTED — SIGNIFICANT CHANGE UP
IMM GRANULOCYTES # BLD AUTO: 0.02 # — SIGNIFICANT CHANGE UP
IMM GRANULOCYTES NFR BLD AUTO: 0.5 % — SIGNIFICANT CHANGE UP (ref 0–1.5)
KETONES UR-MCNC: NEGATIVE — SIGNIFICANT CHANGE UP
LACTATE BLDV-MCNC: 11.2 MMOL/L — CRITICAL HIGH (ref 0.5–2)
LACTATE BLDV-MCNC: 8.3 MMOL/L — CRITICAL HIGH (ref 0.5–2)
LEUKOCYTE ESTERASE UR-ACNC: NEGATIVE — SIGNIFICANT CHANGE UP
LYMPHOCYTES # BLD AUTO: 2.19 K/UL — SIGNIFICANT CHANGE UP (ref 1–3.3)
LYMPHOCYTES # BLD AUTO: 60 % — HIGH (ref 13–44)
LYMPHOCYTES NFR SPEC AUTO: 47.4 % — HIGH (ref 13–44)
M PNEUMO DNA SPEC QL NAA+PROBE: NOT DETECTED — SIGNIFICANT CHANGE UP
MACROCYTES BLD QL: SIGNIFICANT CHANGE UP
MCHC RBC-ENTMCNC: 28.3 PG — SIGNIFICANT CHANGE UP (ref 27–34)
MCHC RBC-ENTMCNC: 31.3 % — LOW (ref 32–36)
MCV RBC AUTO: 90.5 FL — SIGNIFICANT CHANGE UP (ref 80–100)
METAMYELOCYTES # FLD: 0 % — SIGNIFICANT CHANGE UP (ref 0–1)
MONOCYTES # BLD AUTO: 0.17 K/UL — SIGNIFICANT CHANGE UP (ref 0–0.9)
MONOCYTES NFR BLD AUTO: 4.7 % — SIGNIFICANT CHANGE UP (ref 2–14)
MONOCYTES NFR BLD: 4.2 % — SIGNIFICANT CHANGE UP (ref 2–9)
MUCOUS THREADS # UR AUTO: SIGNIFICANT CHANGE UP
MYELOCYTES NFR BLD: 1 % — HIGH (ref 0–0)
NEUTROPHIL AB SER-ACNC: 22.1 % — LOW (ref 43–77)
NEUTROPHILS # BLD AUTO: 1.25 K/UL — LOW (ref 1.8–7.4)
NEUTROPHILS NFR BLD AUTO: 34.3 % — LOW (ref 43–77)
NEUTS BAND # BLD: 22.1 % — HIGH (ref 0–6)
NITRITE UR-MCNC: NEGATIVE — SIGNIFICANT CHANGE UP
NRBC # FLD: 0.02 — SIGNIFICANT CHANGE UP
OTHER - HEMATOLOGY %: 0 — SIGNIFICANT CHANGE UP
PCO2 BLDV: 31 MMHG — LOW (ref 41–51)
PCO2 BLDV: 45 MMHG — SIGNIFICANT CHANGE UP (ref 41–51)
PH BLDV: 7.21 PH — LOW (ref 7.32–7.43)
PH BLDV: 7.24 PH — LOW (ref 7.32–7.43)
PH UR: 5.5 — SIGNIFICANT CHANGE UP (ref 5–8)
PLATELET # BLD AUTO: 174 K/UL — SIGNIFICANT CHANGE UP (ref 150–400)
PLATELET COUNT - ESTIMATE: NORMAL — SIGNIFICANT CHANGE UP
PMV BLD: 9.2 FL — SIGNIFICANT CHANGE UP (ref 7–13)
PO2 BLDV: 23 MMHG — LOW (ref 35–40)
PO2 BLDV: 49 MMHG — HIGH (ref 35–40)
POLYCHROMASIA BLD QL SMEAR: SLIGHT — SIGNIFICANT CHANGE UP
POTASSIUM BLDV-SCNC: 4.2 MMOL/L — SIGNIFICANT CHANGE UP (ref 3.4–4.5)
POTASSIUM BLDV-SCNC: 4.7 MMOL/L — HIGH (ref 3.4–4.5)
POTASSIUM SERPL-MCNC: 4.9 MMOL/L — SIGNIFICANT CHANGE UP (ref 3.5–5.3)
POTASSIUM SERPL-SCNC: 4.9 MMOL/L — SIGNIFICANT CHANGE UP (ref 3.5–5.3)
PROMYELOCYTES # FLD: 0 % — SIGNIFICANT CHANGE UP (ref 0–0)
PROT SERPL-MCNC: 9.8 G/DL — HIGH (ref 6–8.3)
PROT UR-MCNC: 30 — SIGNIFICANT CHANGE UP
RBC # BLD: 5.48 M/UL — SIGNIFICANT CHANGE UP (ref 4.2–5.8)
RBC # FLD: 15 % — HIGH (ref 10.3–14.5)
RBC CASTS # UR COMP ASSIST: SIGNIFICANT CHANGE UP (ref 0–?)
REVIEW TO FOLLOW: YES — SIGNIFICANT CHANGE UP
RSV RNA SPEC QL NAA+PROBE: NOT DETECTED — SIGNIFICANT CHANGE UP
RV+EV RNA SPEC QL NAA+PROBE: NOT DETECTED — SIGNIFICANT CHANGE UP
SAO2 % BLDV: 27.8 % — LOW (ref 60–85)
SAO2 % BLDV: 76.5 % — SIGNIFICANT CHANGE UP (ref 60–85)
SMUDGE CELLS # BLD: PRESENT — SIGNIFICANT CHANGE UP
SODIUM SERPL-SCNC: 133 MMOL/L — LOW (ref 135–145)
SP GR SPEC: 1.02 — SIGNIFICANT CHANGE UP (ref 1–1.04)
SQUAMOUS # UR AUTO: SIGNIFICANT CHANGE UP
UROBILINOGEN FLD QL: 1 — SIGNIFICANT CHANGE UP
VARIANT LYMPHS # BLD: 3.2 % — SIGNIFICANT CHANGE UP
WBC # BLD: 3.65 K/UL — LOW (ref 3.8–10.5)
WBC # FLD AUTO: 3.65 K/UL — LOW (ref 3.8–10.5)
WBC UR QL: SIGNIFICANT CHANGE UP (ref 0–?)

## 2018-08-20 PROCEDURE — 99223 1ST HOSP IP/OBS HIGH 75: CPT

## 2018-08-20 PROCEDURE — 71045 X-RAY EXAM CHEST 1 VIEW: CPT | Mod: 26

## 2018-08-20 PROCEDURE — 99233 SBSQ HOSP IP/OBS HIGH 50: CPT | Mod: GC

## 2018-08-20 RX ORDER — DEXTROSE 50 % IN WATER 50 %
15 SYRINGE (ML) INTRAVENOUS ONCE
Qty: 0 | Refills: 0 | Status: DISCONTINUED | OUTPATIENT
Start: 2018-08-20 | End: 2018-08-28

## 2018-08-20 RX ORDER — AMPICILLIN SODIUM AND SULBACTAM SODIUM 250; 125 MG/ML; MG/ML
1.5 INJECTION, POWDER, FOR SUSPENSION INTRAMUSCULAR; INTRAVENOUS ONCE
Qty: 0 | Refills: 0 | Status: DISCONTINUED | OUTPATIENT
Start: 2018-08-20 | End: 2018-08-20

## 2018-08-20 RX ORDER — VANCOMYCIN HCL 1 G
1000 VIAL (EA) INTRAVENOUS ONCE
Qty: 0 | Refills: 0 | Status: COMPLETED | OUTPATIENT
Start: 2018-08-20 | End: 2018-08-20

## 2018-08-20 RX ORDER — ONDANSETRON 8 MG/1
4 TABLET, FILM COATED ORAL EVERY 6 HOURS
Qty: 0 | Refills: 0 | Status: DISCONTINUED | OUTPATIENT
Start: 2018-08-20 | End: 2018-08-28

## 2018-08-20 RX ORDER — SODIUM CHLORIDE 9 MG/ML
1000 INJECTION INTRAMUSCULAR; INTRAVENOUS; SUBCUTANEOUS
Qty: 0 | Refills: 0 | Status: COMPLETED | OUTPATIENT
Start: 2018-08-20 | End: 2018-08-20

## 2018-08-20 RX ORDER — PIPERACILLIN AND TAZOBACTAM 4; .5 G/20ML; G/20ML
3.38 INJECTION, POWDER, LYOPHILIZED, FOR SOLUTION INTRAVENOUS ONCE
Qty: 0 | Refills: 0 | Status: COMPLETED | OUTPATIENT
Start: 2018-08-20 | End: 2018-08-20

## 2018-08-20 RX ORDER — VANCOMYCIN HCL 1 G
1000 VIAL (EA) INTRAVENOUS EVERY 24 HOURS
Qty: 0 | Refills: 0 | Status: DISCONTINUED | OUTPATIENT
Start: 2018-08-21 | End: 2018-08-21

## 2018-08-20 RX ORDER — PIPERACILLIN AND TAZOBACTAM 4; .5 G/20ML; G/20ML
3.38 INJECTION, POWDER, LYOPHILIZED, FOR SOLUTION INTRAVENOUS EVERY 8 HOURS
Qty: 0 | Refills: 0 | Status: COMPLETED | OUTPATIENT
Start: 2018-08-20 | End: 2018-08-27

## 2018-08-20 RX ORDER — SODIUM CHLORIDE 9 MG/ML
1000 INJECTION, SOLUTION INTRAVENOUS
Qty: 0 | Refills: 0 | Status: DISCONTINUED | OUTPATIENT
Start: 2018-08-20 | End: 2018-08-21

## 2018-08-20 RX ORDER — SODIUM CHLORIDE 9 MG/ML
1000 INJECTION INTRAMUSCULAR; INTRAVENOUS; SUBCUTANEOUS ONCE
Qty: 0 | Refills: 0 | Status: COMPLETED | OUTPATIENT
Start: 2018-08-20 | End: 2018-08-20

## 2018-08-20 RX ORDER — ONDANSETRON 8 MG/1
4 TABLET, FILM COATED ORAL ONCE
Qty: 0 | Refills: 0 | Status: COMPLETED | OUTPATIENT
Start: 2018-08-20 | End: 2018-08-20

## 2018-08-20 RX ORDER — ACETAMINOPHEN 500 MG
650 TABLET ORAL EVERY 6 HOURS
Qty: 0 | Refills: 0 | Status: DISCONTINUED | OUTPATIENT
Start: 2018-08-20 | End: 2018-08-28

## 2018-08-20 RX ORDER — REPAGLINIDE 1 MG/1
1 TABLET ORAL
Qty: 0 | Refills: 0 | COMMUNITY

## 2018-08-20 RX ORDER — DEXTROSE 50 % IN WATER 50 %
25 SYRINGE (ML) INTRAVENOUS ONCE
Qty: 0 | Refills: 0 | Status: DISCONTINUED | OUTPATIENT
Start: 2018-08-20 | End: 2018-08-28

## 2018-08-20 RX ORDER — ASPIRIN/CALCIUM CARB/MAGNESIUM 324 MG
1 TABLET ORAL
Qty: 0 | Refills: 0 | COMMUNITY

## 2018-08-20 RX ORDER — PREGABALIN 225 MG/1
1 CAPSULE ORAL
Qty: 0 | Refills: 0 | COMMUNITY

## 2018-08-20 RX ORDER — HYDROMORPHONE HYDROCHLORIDE 2 MG/ML
0.5 INJECTION INTRAMUSCULAR; INTRAVENOUS; SUBCUTANEOUS ONCE
Qty: 0 | Refills: 0 | Status: DISCONTINUED | OUTPATIENT
Start: 2018-08-20 | End: 2018-08-21

## 2018-08-20 RX ORDER — SITAGLIPTIN 50 MG/1
1 TABLET, FILM COATED ORAL
Qty: 0 | Refills: 0 | COMMUNITY

## 2018-08-20 RX ORDER — DEXTROSE 50 % IN WATER 50 %
12.5 SYRINGE (ML) INTRAVENOUS ONCE
Qty: 0 | Refills: 0 | Status: DISCONTINUED | OUTPATIENT
Start: 2018-08-20 | End: 2018-08-28

## 2018-08-20 RX ORDER — GLUCAGON INJECTION, SOLUTION 0.5 MG/.1ML
1 INJECTION, SOLUTION SUBCUTANEOUS ONCE
Qty: 0 | Refills: 0 | Status: DISCONTINUED | OUTPATIENT
Start: 2018-08-20 | End: 2018-08-28

## 2018-08-20 RX ORDER — INSULIN LISPRO 100/ML
VIAL (ML) SUBCUTANEOUS EVERY 6 HOURS
Qty: 0 | Refills: 0 | Status: DISCONTINUED | OUTPATIENT
Start: 2018-08-20 | End: 2018-08-24

## 2018-08-20 RX ORDER — HYDROMORPHONE HYDROCHLORIDE 2 MG/ML
0.2 INJECTION INTRAMUSCULAR; INTRAVENOUS; SUBCUTANEOUS
Qty: 0 | Refills: 0 | Status: DISCONTINUED | OUTPATIENT
Start: 2018-08-20 | End: 2018-08-23

## 2018-08-20 RX ORDER — SODIUM CHLORIDE 9 MG/ML
1000 INJECTION, SOLUTION INTRAVENOUS
Qty: 0 | Refills: 0 | Status: DISCONTINUED | OUTPATIENT
Start: 2018-08-20 | End: 2018-08-28

## 2018-08-20 RX ORDER — FOLIC ACID 0.8 MG
1 TABLET ORAL
Qty: 0 | Refills: 0 | COMMUNITY

## 2018-08-20 RX ORDER — ATORVASTATIN CALCIUM 80 MG/1
1 TABLET, FILM COATED ORAL
Qty: 0 | Refills: 0 | COMMUNITY

## 2018-08-20 RX ORDER — DONEPEZIL HYDROCHLORIDE 10 MG/1
1 TABLET, FILM COATED ORAL
Qty: 0 | Refills: 0 | COMMUNITY

## 2018-08-20 RX ORDER — SODIUM CHLORIDE 9 MG/ML
2000 INJECTION INTRAMUSCULAR; INTRAVENOUS; SUBCUTANEOUS ONCE
Qty: 0 | Refills: 0 | Status: COMPLETED | OUTPATIENT
Start: 2018-08-20 | End: 2018-08-20

## 2018-08-20 RX ORDER — ACETAMINOPHEN 500 MG
1000 TABLET ORAL ONCE
Qty: 0 | Refills: 0 | Status: COMPLETED | OUTPATIENT
Start: 2018-08-20 | End: 2018-08-20

## 2018-08-20 RX ADMIN — SODIUM CHLORIDE 1000 MILLILITER(S): 9 INJECTION INTRAMUSCULAR; INTRAVENOUS; SUBCUTANEOUS at 15:13

## 2018-08-20 RX ADMIN — SODIUM CHLORIDE 1000 MILLILITER(S): 9 INJECTION INTRAMUSCULAR; INTRAVENOUS; SUBCUTANEOUS at 18:29

## 2018-08-20 RX ADMIN — SODIUM CHLORIDE 1000 MILLILITER(S): 9 INJECTION INTRAMUSCULAR; INTRAVENOUS; SUBCUTANEOUS at 16:31

## 2018-08-20 RX ADMIN — PIPERACILLIN AND TAZOBACTAM 3.38 GRAM(S): 4; .5 INJECTION, POWDER, LYOPHILIZED, FOR SOLUTION INTRAVENOUS at 11:57

## 2018-08-20 RX ADMIN — Medication 1000 MILLIGRAM(S): at 11:57

## 2018-08-20 RX ADMIN — SODIUM CHLORIDE 2000 MILLILITER(S): 9 INJECTION INTRAMUSCULAR; INTRAVENOUS; SUBCUTANEOUS at 11:42

## 2018-08-20 RX ADMIN — SODIUM CHLORIDE 125 MILLILITER(S): 9 INJECTION, SOLUTION INTRAVENOUS at 19:46

## 2018-08-20 RX ADMIN — Medication 250 MILLIGRAM(S): at 11:58

## 2018-08-20 RX ADMIN — Medication 1000 MILLIGRAM(S): at 13:02

## 2018-08-20 RX ADMIN — ONDANSETRON 4 MILLIGRAM(S): 8 TABLET, FILM COATED ORAL at 11:57

## 2018-08-20 RX ADMIN — SODIUM CHLORIDE 2000 MILLILITER(S): 9 INJECTION INTRAMUSCULAR; INTRAVENOUS; SUBCUTANEOUS at 12:36

## 2018-08-20 RX ADMIN — PIPERACILLIN AND TAZOBACTAM 200 GRAM(S): 4; .5 INJECTION, POWDER, LYOPHILIZED, FOR SOLUTION INTRAVENOUS at 11:23

## 2018-08-20 RX ADMIN — SODIUM CHLORIDE 1000 MILLILITER(S): 9 INJECTION INTRAMUSCULAR; INTRAVENOUS; SUBCUTANEOUS at 22:01

## 2018-08-20 RX ADMIN — Medication 400 MILLIGRAM(S): at 11:23

## 2018-08-20 RX ADMIN — HYDROMORPHONE HYDROCHLORIDE 100 MILLIGRAM(S): 2 INJECTION INTRAMUSCULAR; INTRAVENOUS; SUBCUTANEOUS at 16:31

## 2018-08-20 RX ADMIN — HYDROMORPHONE HYDROCHLORIDE 0.5 MILLIGRAM(S): 2 INJECTION INTRAMUSCULAR; INTRAVENOUS; SUBCUTANEOUS at 19:37

## 2018-08-20 NOTE — CONSULT NOTE ADULT - SUBJECTIVE AND OBJECTIVE BOX
CHIEF COMPLAINT:    HPI:        PAST MEDICAL & SURGICAL HISTORY:  Hep C w/o coma, chronic  Tubular adenoma of colon  Gastritis: + h pylori  DM (diabetes mellitus)  GERD (gastroesophageal reflux disease)  HTN (hypertension)  History of subdural hematoma (post traumatic)  S/P CABG x 3  No Past Surgical History      FAMILY HISTORY:  No pertinent family history in first degree relatives      SOCIAL HISTORY:  Smoking: [x ] Never Smoked [ ] Former Smoker (__ packs x ___ years) [ ] Current Smoker  (__ packs x ___ years)  Substance Use: [x ] Never Used [ ] Used ____  EtOH Use: None  Marital Status: [ ] Single [ ]  [ ]  [x ]   Sexual History: N/A  Occupation: Retired  Recent Travel: None  Country of Birth: [facility]  Advance Directives: DNR/DNI    Allergies: No Known Allergies    HOME MEDICATIONS:    REVIEW OF SYSTEMS:    [x ] Unable to assess ROS because advanced dementia, BiPAP mask    OBJECTIVE:  ICU Vital Signs Last 24 Hrs  T(C): 37.6 (20 Aug 2018 13:59), Max: 39.1 (20 Aug 2018 10:49)  T(F): 99.6 (20 Aug 2018 13:59), Max: 102.3 (20 Aug 2018 10:49)  HR: 115 (20 Aug 2018 15:02) (115 - 156)  BP: 144/96 (20 Aug 2018 13:59) (105/70 - 144/96)  BP(mean): --  ABP: --  ABP(mean): --  RR: 27 (20 Aug 2018 13:59) (22 - 35)  SpO2: 96% (20 Aug 2018 15:02) (96% - 100%)    CAPILLARY BLOOD GLUCOSE      PHYSICAL EXAM:  General:   HEENT:   Lymph Nodes:  Neck:   Respiratory:   Cardiovascular:   Abdomen:   Extremities:   Skin:   Neurological:  Psychiatry:    LINES:     HOSPITAL MEDICATIONS:    HYDROmorphone  Injectable 0.2 milliGRAM(s) IV Push every 3 hours PRN  HYDROmorphone  IVPB 0.5 milliGRAM(s) IV Intermittent Once    LABS:                        15.5   3.65  )-----------( 174      ( 20 Aug 2018 11:10 )             49.6     Hgb Trend: 15.5<--  08-20    133<L>  |  97<L>  |  37<H>  ----------------------------<  372<H>  4.9   |  15<L>  |  2.08<H>    Ca    11.5<H>      20 Aug 2018 11:10    TPro  9.8<H>  /  Alb  3.5  /  TBili  1.3<H>  /  DBili  x   /  AST  54<H>  /  ALT  30  /  AlkPhos  114      Creatinine Trend: 2.08<--    Urinalysis Basic - ( 20 Aug 2018 15:10 )    Color: YELLOW / Appearance: HAZY / S.021 / pH: 5.5  Gluc: TRACE / Ketone: NEGATIVE  / Bili: SMALL / Urobili: 1   Blood: NEGATIVE / Protein: 30 / Nitrite: NEGATIVE   Leuk Esterase: NEGATIVE / RBC: 3-5 / WBC 2-5   Sq Epi: OCC / Non Sq Epi: x / Bacteria: FEW        Venous Blood Gas:   @ 12:35  7.24/31/49/14/76.5  VBG Lactate: 8.3  Venous Blood Gas:   @ 11:10  7.21/45/23/15/27.8  VBG Lactate: 11.2      MICROBIOLOGY:     RADIOLOGY:  [ ] Reviewed and interpreted by me    EKG: CHIEF COMPLAINT:    HPI:    88 year old male with DM2, HTN, Alzheimer's dementia , CAD s/p CABG (c/b Afib however not sustained) presents for respiratory distress. History obtained from ED provider note and daughter (registered nurse) at bedside. Patient awoke at 4:30 am with episode of bilious emesis followed by cough and increased difficulty with breathing. Due to history of dementia, patient did not endorse SOB however family noted respiratory distress. At baseline, patient is AOx1 baseline, dependent for ADLs and has had significant debilitation over the past 4 months.      PAST MEDICAL & SURGICAL HISTORY:  Hep C w/o coma, chronic  Tubular adenoma of colon  Gastritis: + h pylori  DM (diabetes mellitus)  GERD (gastroesophageal reflux disease)  HTN (hypertension)  History of subdural hematoma (post traumatic)  S/P CABG x 3  No Past Surgical History      FAMILY HISTORY:  No pertinent family history in first degree relatives      SOCIAL HISTORY:  Smoking: [x ] Never Smoked [ ] Former Smoker (__ packs x ___ years) [ ] Current Smoker  (__ packs x ___ years)  Substance Use: [x ] Never Used [ ] Used ____  EtOH Use: None  Marital Status: [ ] Single [ ]  [ ]  [x ]   Sexual History: N/A  Occupation: Retired  Recent Travel: None  Country of Birth: [facility]  Advance Directives: DNR/DNI    Allergies: No Known Allergies    HOME MEDICATIONS:    REVIEW OF SYSTEMS:    [x ] Unable to assess ROS because advanced dementia, BiPAP mask    OBJECTIVE:  ICU Vital Signs Last 24 Hrs  T(C): 37.6 (20 Aug 2018 13:59), Max: 39.1 (20 Aug 2018 10:49)  T(F): 99.6 (20 Aug 2018 13:59), Max: 102.3 (20 Aug 2018 10:49)  HR: 115 (20 Aug 2018 15:02) (115 - 156)  BP: 144/96 (20 Aug 2018 13:59) (105/70 - 144/96)  BP(mean): --  ABP: --  ABP(mean): --  RR: 27 (20 Aug 2018 13:59) (22 - 35)  SpO2: 96% (20 Aug 2018 15:02) (96% - 100%)    CAPILLARY BLOOD GLUCOSE      PHYSICAL EXAM:  General:   HEENT:   Lymph Nodes:  Neck:   Respiratory:   Cardiovascular:   Abdomen:   Extremities:   Skin:   Neurological:  Psychiatry:    LINES:     HOSPITAL MEDICATIONS:    HYDROmorphone  Injectable 0.2 milliGRAM(s) IV Push every 3 hours PRN  HYDROmorphone  IVPB 0.5 milliGRAM(s) IV Intermittent Once    LABS:                        15.5   3.65  )-----------( 174      ( 20 Aug 2018 11:10 )             49.6     Hgb Trend: 15.5<--  0820    133<L>  |  97<L>  |  37<H>  ----------------------------<  372<H>  4.9   |  15<L>  |  2.08<H>    Ca    11.5<H>      20 Aug 2018 11:10    TPro  9.8<H>  /  Alb  3.5  /  TBili  1.3<H>  /  DBili  x   /  AST  54<H>  /  ALT  30  /  AlkPhos  114      Creatinine Trend: 2.08<--    Urinalysis Basic - ( 20 Aug 2018 15:10 )    Color: YELLOW / Appearance: HAZY / S.021 / pH: 5.5  Gluc: TRACE / Ketone: NEGATIVE  / Bili: SMALL / Urobili: 1   Blood: NEGATIVE / Protein: 30 / Nitrite: NEGATIVE   Leuk Esterase: NEGATIVE / RBC: 3-5 / WBC 2-5   Sq Epi: OCC / Non Sq Epi: x / Bacteria: FEW        Venous Blood Gas:   @ 12:35  7.24/31/49/14/76.5  VBG Lactate: 8.3  Venous Blood Gas:   @ 11:10  7.21/45/23/15/27.8  VBG Lactate: 11.2      MICROBIOLOGY:     RADIOLOGY:  [ ] Reviewed and interpreted by me    EKG: CHIEF COMPLAINT:    HPI:    88 year old male with DM2, HTN, Alzheimer's dementia , CAD s/p CABG (c/b Afib however not sustained) presents for respiratory distress. History obtained from ED provider note and daughter (registered nurse) at bedside. Patient awoke at 4:30 am with episode of bilious emesis followed by cough and increased difficulty with breathing. Due to history of dementia, patient did not endorse SOB however family noted respiratory distress. At baseline, patient is AOx1 baseline, dependent for ADLs and has had significant debilitation over the past 4 months due to recently diagnosed multiple myeloma. Patient was placed in hospice care however daughter states hospice care did not respond when she attempted to report her father's respiratory distress. As she did not want him to suffer, and believes his current decompensation to be secondary to an acute event (reports patient was at his baseline yesterday), she brought him to the hospital.       Upon arrival per EMS, patient hypotensive, tachycardic, tachypneic and febrile. Initially BP 80/52 which improved to 106/78 with 500cc bolus. Also 83% on RA initially, put on 15L NRB en route to ED. In ED, patient placed on BiPAP (12/5; Fi02 60%), given 2L IVF boluses, and started on vanc/Zosyn for presumed aspiration PNA although CXR demonstrated clear lungs. Palliative Care was consulted. Family decided to rescind hospice care but patient remains DNR/DNI. MICU was consulted for new BiPAP use and hypotension. Daughter states she would be amenable to IV pressors.       PAST MEDICAL & SURGICAL HISTORY:  Hep C w/o coma, chronic  Tubular adenoma of colon  Gastritis: + h pylori  DM (diabetes mellitus)  GERD (gastroesophageal reflux disease)  HTN (hypertension)  History of subdural hematoma (post traumatic)  S/P CABG x 3  No Past Surgical History      FAMILY HISTORY:  No pertinent family history in first degree relatives      SOCIAL HISTORY:  Smoking: [x ] Never Smoked [ ] Former Smoker (__ packs x ___ years) [ ] Current Smoker  (__ packs x ___ years)  Substance Use: [x ] Never Used [ ] Used ____  EtOH Use: None  Marital Status: [ ] Single [ ]  [ ]  [x ]   Sexual History: N/A  Occupation: Retired  Recent Travel: None  Country of Birth: [facility]  Advance Directives: DNR/DNI    Allergies: No Known Allergies    HOME MEDICATIONS:    REVIEW OF SYSTEMS:    [x ] Unable to assess ROS because advanced dementia, BiPAP mask    OBJECTIVE:  ICU Vital Signs Last 24 Hrs  T(C): 37.6 (20 Aug 2018 13:59), Max: 39.1 (20 Aug 2018 10:49)  T(F): 99.6 (20 Aug 2018 13:59), Max: 102.3 (20 Aug 2018 10:49)  HR: 115 (20 Aug 2018 15:02) (115 - 156)  BP: 144/96 (20 Aug 2018 13:59) (105/70 - 144/96)  BP(mean): --  ABP: --  ABP(mean): --  RR: 27 (20 Aug 2018 13:59) (22 - 35)  SpO2: 96% (20 Aug 2018 15:02) (96% - 100%)    CAPILLARY BLOOD GLUCOSE      PHYSICAL EXAM:  General: Elderly, cachectic, ill appearing male, responsive to name on BiPAP  HEENT: PERRLA, sclera non-icteric, oral mucous membranes dry   Lymph Nodes: No palpable LAD  Neck: No JVD  Respiratory: accessory muscle use, crackles vs. diminished at R. lung base, no wheeze  Cardiovascular: S1 and S2 present, regular rhythm, tachycardic, no murmurs   Abdomen: Soft, non-tender  Extremities: Warm, 2+ peripheral pulses   Skin: Warm and dry, skin tenting, no mottling   Neurological: Alert to voice, oriented to name only  Psychiatry: Unable to assess    LINES:     HOSPITAL MEDICATIONS:    HYDROmorphone  Injectable 0.2 milliGRAM(s) IV Push every 3 hours PRN  HYDROmorphone  IVPB 0.5 milliGRAM(s) IV Intermittent Once    LABS:                        15.5   3.65  )-----------( 174      ( 20 Aug 2018 11:10 )             49.6     Hgb Trend: 15.5<--  08-20    133<L>  |  97<L>  |  37<H>  ----------------------------<  372<H>  4.9   |  15<L>  |  2.08<H>    Ca    11.5<H>      20 Aug 2018 11:10    TPro  9.8<H>  /  Alb  3.5  /  TBili  1.3<H>  /  DBili  x   /  AST  54<H>  /  ALT  30  /  AlkPhos  114      Creatinine Trend: 2.08<--    Urinalysis Basic - ( 20 Aug 2018 15:10 )    Color: YELLOW / Appearance: HAZY / S.021 / pH: 5.5  Gluc: TRACE / Ketone: NEGATIVE  / Bili: SMALL / Urobili: 1   Blood: NEGATIVE / Protein: 30 / Nitrite: NEGATIVE   Leuk Esterase: NEGATIVE / RBC: 3-5 / WBC 2-5   Sq Epi: OCC / Non Sq Epi: x / Bacteria: FEW        Venous Blood Gas:   @ 12:35  7.24/31/49/14/76.5  VBG Lactate: 8.3  Venous Blood Gas:   @ 11:10  7.21/45/23/15/27.8  VBG Lactate: 11.2      MICROBIOLOGY:     RADIOLOGY:  [ ] Reviewed and interpreted by me    EKG: CHIEF COMPLAINT:    HPI:    88 year old male with DM2, HTN, Alzheimer's dementia , CAD s/p CABG (c/b Afib however not sustained) presents for respiratory distress. History obtained from ED provider note and daughter (registered nurse) at bedside. Patient awoke at 4:30 am with episode of bilious emesis followed by cough and increased difficulty with breathing. Due to history of dementia, patient did not endorse SOB, cough etc. At baseline, patient is AOx1 baseline, dependent for ADLs and has had significant debilitation over the past 4 months due to recently diagnosed multiple myeloma. Patient was placed in hospice care however daughter states hospice care did not respond when she attempted to report her father's respiratory distress. As she did not want him to suffer, and believes his current decompensation to be secondary to an acute event (reports patient was at his baseline yesterday), she brought him to the hospital.       Upon arrival per EMS, patient hypotensive, tachycardic, tachypneic and febrile. Initially BP 80/52 which improved to 106/78 with 500cc bolus. Also 83% on RA initially, put on 15L NRB en route to ED. In ED, patient placed on BiPAP (12/5; Fi02 60%), given 2L IVF boluses, and started on vanc/Zosyn for presumed aspiration PNA although CXR demonstrated clear lungs. Palliative Care was consulted. Family decided to rescind hospice care but patient remains DNR/DNI. MICU was consulted for new BiPAP use and hypotension. Daughter states she would be amenable to IV pressors.       PAST MEDICAL & SURGICAL HISTORY:  Hep C w/o coma, chronic  Tubular adenoma of colon  Gastritis: + h pylori  DM (diabetes mellitus)  GERD (gastroesophageal reflux disease)  HTN (hypertension)  History of subdural hematoma (post traumatic)  S/P CABG x 3  No Past Surgical History      FAMILY HISTORY:  No pertinent family history in first degree relatives      SOCIAL HISTORY:  Smoking: [x ] Never Smoked [ ] Former Smoker (__ packs x ___ years) [ ] Current Smoker  (__ packs x ___ years)  Substance Use: [x ] Never Used [ ] Used ____  EtOH Use: None  Marital Status: [ ] Single [ ]  [ ]  [x ]   Sexual History: N/A  Occupation: Retired  Recent Travel: None  Country of Birth: [facility]  Advance Directives: DNR/DNI    Allergies: No Known Allergies    HOME MEDICATIONS:    REVIEW OF SYSTEMS:    [x ] Unable to assess ROS because advanced dementia, BiPAP mask    OBJECTIVE:  ICU Vital Signs Last 24 Hrs  T(C): 37.6 (20 Aug 2018 13:59), Max: 39.1 (20 Aug 2018 10:49)  T(F): 99.6 (20 Aug 2018 13:59), Max: 102.3 (20 Aug 2018 10:49)  HR: 115 (20 Aug 2018 15:02) (115 - 156)  BP: 144/96 (20 Aug 2018 13:59) (105/70 - 144/96)  BP(mean): --  ABP: --  ABP(mean): --  RR: 27 (20 Aug 2018 13:59) (22 - 35)  SpO2: 96% (20 Aug 2018 15:02) (96% - 100%)    CAPILLARY BLOOD GLUCOSE      PHYSICAL EXAM:  General: Elderly, cachectic, ill appearing male, responsive to name on BiPAP  HEENT: PERRLA, sclera non-icteric, oral mucous membranes dry   Lymph Nodes: No palpable LAD  Neck: No JVD  Respiratory: accessory muscle use, crackles vs. diminished at R. lung base, no wheeze  Cardiovascular: S1 and S2 present, regular rhythm, tachycardic, no murmurs   Abdomen: Soft, non-tender  Extremities: Warm, 2+ peripheral pulses   Skin: Warm and dry, skin tenting, no mottling   Neurological: Alert to voice, oriented to name only  Psychiatry: Unable to assess    LINES:     HOSPITAL MEDICATIONS:    HYDROmorphone  Injectable 0.2 milliGRAM(s) IV Push every 3 hours PRN  HYDROmorphone  IVPB 0.5 milliGRAM(s) IV Intermittent Once    LABS:                        15.5   3.65  )-----------( 174      ( 20 Aug 2018 11:10 )             49.6     Hgb Trend: 15.5<--  08-20    133<L>  |  97<L>  |  37<H>  ----------------------------<  372<H>  4.9   |  15<L>  |  2.08<H>    Ca    11.5<H>      20 Aug 2018 11:10    TPro  9.8<H>  /  Alb  3.5  /  TBili  1.3<H>  /  DBili  x   /  AST  54<H>  /  ALT  30  /  AlkPhos  114      Creatinine Trend: 2.08<--    Urinalysis Basic - ( 20 Aug 2018 15:10 )    Color: YELLOW / Appearance: HAZY / S.021 / pH: 5.5  Gluc: TRACE / Ketone: NEGATIVE  / Bili: SMALL / Urobili: 1   Blood: NEGATIVE / Protein: 30 / Nitrite: NEGATIVE   Leuk Esterase: NEGATIVE / RBC: 3-5 / WBC 2-5   Sq Epi: OCC / Non Sq Epi: x / Bacteria: FEW      Venous Blood Gas:   @ 12:35  7.24/31/49/14/76.5  VBG Lactate: 8.3  Venous Blood Gas:   @ 11:10  7.21/45/23/15/27.8  VBG Lactate: 11.2      MICROBIOLOGY:     RADIOLOGY:  [x ] Reviewed and interpreted by me      EXAM:  XR CHEST PORTABLE URGENT 1V      PROCEDURE DATE:  Aug 20 2018       INTERPRETATION:  TIME OF EXAM: 2018 at 11:23 AM    CLINICAL INFORMATION: Fever    TECHNIQUE:   Portable chest    INTERPRETATION:     Sternotomy wires are present. The lungs are clear. The heart is not   enlarged and there are no effusions. Tortuosity of the aorta.      COMPARISON:  2018    IMPRESSION:  Clear lungs.      EKG:

## 2018-08-20 NOTE — CONSULT NOTE ADULT - PROBLEM SELECTOR RECOMMENDATION 9
possible sec to hypercalcemia vs ATN sec to hypotension   check urine cr, na, UA  check renal US r/o obstruction  s/p 3L NS bolus   monitor BMP possible sec to hypercalcemia vs ATN sec to hypotension   check urine cr, na, UA  check renal US r/o obstruction  s/p 3L NS bolus   consider NS @50 cc/hr   monitor BMP ERIKA likely hemodynamically mediated in setting of hypotension and hypercalcemia.   baseline scr 1.2-1.3  check urine cr, na, UA  check renal US r/o obstruction  s/p 3L NS bolus   continue ivf  monitor BMP

## 2018-08-20 NOTE — ED ADULT TRIAGE NOTE - CHIEF COMPLAINT QUOTE
Patient brought to ER by EMS from home. Pt is a patient with multiple myeloma. Pt had a productive cough and has had shortness of breath since last night. Left Ac 18gauge with NS infusing.

## 2018-08-20 NOTE — ED ADULT NURSE NOTE - OBJECTIVE STATEMENT
Pt presents to room 17, bibems from home, A&Ox1-2 at baseline (hx of dementia), currently responsive to verbal stimuli, ambulatory at baseline with a walker, here for altered mental status, fevers, tachycardia , vomiting, and respiratory distress. pt with pmhx of cabg x3, gerd, hep c, htn, gerd, recently diagnosed with multiple myeloma 5 months ago-pt signed a dnr/dni. was seen coughing on food yesterday and early this morning started vomiting. pt with increased work of breathing, tachycardia and fevers, since 5am. pt has an 18g in left ac which flushes well placed by ems. no skin breakdown noted, IV established in right ac with a 20g, labs drawn and sent, call bell in reach, side rails up, bed in locked position, md evaluation in progress, pt tachycardic to 160s, will continue to monitor.

## 2018-08-20 NOTE — H&P ADULT - NSHPLABSRESULTS_GEN_ALL_CORE
15.5   3.65  )-----------( 174      ( 20 Aug 2018 11:10 )             49.6       08-20    133<L>  |  97<L>  |  37<H>  ----------------------------<  372<H>  4.9   |  15<L>  |  2.08<H>    Ca    11.5<H>      20 Aug 2018 11:10    TPro  9.8<H>  /  Alb  3.5  /  TBili  1.3<H>  /  DBili  x   /  AST  54<H>  /  ALT  30  /  AlkPhos  114  08-20      CAPILLARY BLOOD GLUCOSE          Urinalysis Basic - ( 20 Aug 2018 15:10 )    Color: YELLOW / Appearance: HAZY / S.021 / pH: 5.5  Gluc: TRACE / Ketone: NEGATIVE  / Bili: SMALL / Urobili: 1   Blood: NEGATIVE / Protein: 30 / Nitrite: NEGATIVE   Leuk Esterase: NEGATIVE / RBC: 3-5 / WBC 2-5   Sq Epi: OCC / Non Sq Epi: x / Bacteria: FEW        EKG -->tele monitor Sinus at 95     Radiology  CXR- no acute infiltrate

## 2018-08-20 NOTE — H&P ADULT - NSHPPHYSICALEXAM_GEN_ALL_CORE
Vital Signs Last 24 Hrs  T(C): 37.6 (20 Aug 2018 13:59), Max: 39.1 (20 Aug 2018 10:49)  T(F): 99.6 (20 Aug 2018 13:59), Max: 102.3 (20 Aug 2018 10:49)  HR: 95 (20 Aug 2018 17:56) (95 - 156)  BP: 69/30 (20 Aug 2018 17:56) (69/30 - 144/96)  BP(mean): --  RR: 24 (20 Aug 2018 17:56) (22 - 35)  SpO2: 100% (20 Aug 2018 17:56) (96% - 100%)    PHYSICAL EXAM:  GENERAL: tachypneic on BiPAP  HEAD:  Atraumatic, Normocephalic  EYES: does not spontaeously open eyes  NECK: Supple, No JVD  CHEST/LUNG: Clear to auscultation bilaterally;   HEART: Regular rate and rhythm; No murmurs, rubs, or gallops  ABDOMEN: Soft, Nontender, Nondistended; Bowel sounds present  EXTREMITIES:  2+ Peripheral Pulses, No clubbing, cyanosis, or edema  PSYCH: somnolent  NEUROLOGY: uncooperative with exam

## 2018-08-20 NOTE — CONSULT NOTE ADULT - SUBJECTIVE AND OBJECTIVE BOX
[facility] [place] [facility]OLOG[facility]   MD RUDDY BACH MD ANGELA WONG, PA    TEL:  OFFICE: [phone]  DR FRANCES CELL: [phone]  DR. VILLALTA CELL: [phone]  SHAYNA BORREGO CELL: [phone]      HPI:  88 year old man with DM, HTN, MM, Alzheimer's CAD s/p bypass c/b afib p/w cough, fever. Per daughter, pt was at baseline yesterday, and woke up overnight at 4am with Non Bloody, non Bilious vomiting and coughing and altered. At baseline, able to have a conversation and recognizes familiar faces. Upon arrival per EMS pt hypotensive, tachycardic, tachypneic, febrile. Initially bp 80/52 which improved to 106/78 with 500cc bolus. 83% on RA initially, put on 15L NRB with ems. As per daughter who is nurse, Patient may have aspirated due over eating from the previous night. Daughter states he was tachycardic with HR in 140's and SBP in 80's when she arrived to pt's home this morning.     Allergies:  No Known Allergies      PAST MEDICAL & SURGICAL HISTORY:  Hep C w/o coma, chronic  Tubular adenoma of colon  Gastritis: + h pylori  DM (diabetes mellitus)  GERD (gastroesophageal reflux disease)  HTN (hypertension)  History of subdural hematoma (post traumatic)  S/P CABG x 3  No Past Surgical History      Home Medications Reviewed    Hospital Medications:   MEDICATIONS  (STANDING):  HYDROmorphone  IVPB 0.5 milliGRAM(s) IV Intermittent Once      SOCIAL HISTORY:  Denies ETOh, Smoking,     FAMILY HISTORY:  No pertinent family history in first degree relatives      REVIEW OF SYSTEMS:  Patient on bipap, restless, does not follow commands.     VITALS:  T(F): 99.6 (08-20-18 @ 13:59), Max: 102.3 (08-20-18 @ 10:49)  HR: 115 (08-20-18 @ 15:02)  BP: 144/96 (08-20-18 @ 13:59)  RR: 27 (08-20-18 @ 13:59)  SpO2: 96% (08-20-18 @ 15:02)  Wt(kg): --        PHYSICAL EXAM:  Constitutional: on bipap, restless   HEENT: anicteric sclera, oropharynx clear, MMM  Neck: No JVD  Respiratory: diffused rhonchi  Cardiovascular: S1, S2, RRR  Gastrointestinal: BS+, soft, NT/ND  Extremities: No cyanosis or clubbing. No peripheral edema  Neurological: A/O x 0  : No kauffman.   Skin: No rashes      LABS:  08-20    133<L>  |  97<L>  |  37<H>  ----------------------------<  372<H>  4.9   |  15<L>  |  2.08<H>    Ca    11.5<H>      20 Aug 2018 11:10    TPro  9.8<H>  /  Alb  3.5  /  TBili  1.3<H>  /  DBili      /  AST  54<H>  /  ALT  30  /  AlkPhos  114  08-20    Creatinine Trend: 2.08 <--                        15.5   3.65  )-----------( 174      ( 20 Aug 2018 11:10 )             49.6     Urine Studies:        RADIOLOGY & ADDITIONAL STUDIES: [facility] [place] [facility]   MD RUDDY BACH MD ANGELA WONG, PA    TEL:  OFFICE: [phone]  DR ESPINOZA CELL: [phone]  DR. VILLALTA CELL: [phone]  SHAYNA BORREGO CELL: [phone]      HPI:  88 year old man with DM, HTN, MM, Alzheimer's CAD s/p bypass c/b afib p/w cough, fever.  Upon arrival per EMS pt hypotensive, tachycardic, tachypneic, febrile. Initially bp 80/52 which improved to 106/78. Pt follows with dr. Espinoza in office, recently diagnosed with MM.  Pt seen and examined at bedside,     Allergies:  No Known Allergies      PAST MEDICAL & SURGICAL HISTORY:  Hep C w/o coma, chronic  Tubular adenoma of colon  Gastritis: + h pylori  DM (diabetes mellitus)  GERD (gastroesophageal reflux disease)  HTN (hypertension)  History of subdural hematoma (post traumatic)  S/P CABG x 3  No Past Surgical History      Home Medications Reviewed    Hospital Medications:   MEDICATIONS  (STANDING):  HYDROmorphone  IVPB 0.5 milliGRAM(s) IV Intermittent Once      SOCIAL HISTORY:  Denies ETOh, Smoking,     FAMILY HISTORY:  No pertinent family history in first degree relatives      REVIEW OF SYSTEMS:  Patient on bipap, restless, does not follow commands.     VITALS:  T(F): 99.6 (08-20-18 @ 13:59), Max: 102.3 (08-20-18 @ 10:49)  HR: 115 (08-20-18 @ 15:02)  BP: 144/96 (08-20-18 @ 13:59)  RR: 27 (08-20-18 @ 13:59)  SpO2: 96% (08-20-18 @ 15:02)  Wt(kg): --        PHYSICAL EXAM:  Constitutional: on bipap, restless   HEENT: anicteric sclera, oropharynx clear, MMM  Neck: No JVD  Respiratory: diffused rhonchi  Cardiovascular: S1, S2, RRR  Gastrointestinal: BS+, soft, NT/ND  Extremities: No cyanosis or clubbing. No peripheral edema  Neurological: A/O x 0  : No kauffman.   Skin: No rashes      LABS:  08-20    133<L>  |  97<L>  |  37<H>  ----------------------------<  372<H>  4.9   |  15<L>  |  2.08<H>    Ca    11.5<H>      20 Aug 2018 11:10    TPro  9.8<H>  /  Alb  3.5  /  TBili  1.3<H>  /  DBili      /  AST  54<H>  /  ALT  30  /  AlkPhos  114  08-20    Creatinine Trend: 2.08 <--                        15.5   3.65  )-----------( 174      ( 20 Aug 2018 11:10 )             49.6     Urine Studies:        RADIOLOGY & ADDITIONAL STUDIES:

## 2018-08-20 NOTE — CONSULT NOTE ADULT - PROBLEM SELECTOR RECOMMENDATION 6
AG  likely sec to lactic acidosis.   consider sodium bicarb 50meq ivp x 1  monitor hypotensive , improving  monitor closely

## 2018-08-20 NOTE — CONSULT NOTE ADULT - PROBLEM SELECTOR RECOMMENDATION 2
Patient currently on BIPAP, continues to remain tachypneic. given elevated creatinine, would benefit from low dose Dilaudid 0.2mg IV Q3h PRN.

## 2018-08-20 NOTE — CONSULT NOTE ADULT - SUBJECTIVE AND OBJECTIVE BOX
HPI:  88 year old man with DM, HTN, MM, Alzheimer's CAD s/p bypass c/b afib p/w cough, fever. Per daughter, pt was at baseline yesterday, and woke up overnight at 4am with Non Bloody, non Bilious vomiting and coughing and altered. At baseline, able to have a conversation and recognizes familiar faces. Upon arrival per EMS pt hypotensive, tachycardic, tachypneic, febrile. Initially bp 80/52 which improved to 106/78 with 500cc bolus. 83% on RA initially, put on 15L NRB with ems. As per daughter who is nurse, Patient may have aspirated due over eating from the previous night. Daughter states he was tachycardic with HR in 140's and SBP in 80's when she arrived to pt's home this morning. Daughter also states she opted to send pt to [facility] because she believed it was an acute event vs a result of his MM.   Daughter states patients pain has been adequately managed by morphine and ativan.  Pt's last hospitalization 4 months ago. No h/o aspiration pna.      PERTINENT PMH REVIEWED:  [x] YES [ ] NO           SOCIAL HISTORY:  Significant other/partner:  [ ] YES  [x ] NO            Children:  [x ] YES  [ ] NO                   Yazidi/Spirituality:  Substance hx:  [ ] YES   [ ] NO- unknown           Tobacco hx:  [ ] YES  [ ] NO  - unknown           Alcohol hx: [ ] YES  [ ] NO - unknown        Home Opioid hx:  [x ] YES  [ ] NO   Living Situation: [ x] Home with son and daughter in law  [ ] Long term care  [ ] Rehab    FAMILY HISTORY:  No pertinent family history in first degree relatives    [ ] Family history non contributory     BASELINE ADLs (prior to admission):  Independent [ ] moderately [ ] fully   Dependent   [ ] moderately [x ] fully    Code Status:   DNR/DNI                   MOLST  [ ] YES [x] NO    Living Will  [ ] YES [ x] NO    Health Care Proxy [ ] YES  [x] NO      [x ] Surrogate  [ ] HCP  [ ] Guardian:  Eulalia Alesia                                  Phone#: [phone]    Allergies    No Known Allergies    Intolerances      MEDICATIONS  (STANDING):    MEDICATIONS  (PRN):    PRESENT SYMPTOMS:  Source: [ ] Patient   [x ] Family- daughter Eulalia   [ ] Team     Pain: [ ] YES [ x] NO  Onset:                    Location:                          Duration:                 Character:            Aggravating factors:                        Relieving factors:    Radiation:              Timing:                             Severity:      Dyspnea: [x ] YES [ ] NO - Mild [ ]  Moderate [ x]  Severe [ ]    Anxiety: [ ] YES [ x] NO  Fatigue: [x ] YES [ ] NO   Nausea: [ ] YES [x ] NO  Loss of appetite: [ ] YES [x ] NO   Constipation: [ ] YES [x ] NO     Other Symptoms:  [x ] All other review of systems negative   [ ] Unable to obtain due to poor mentation     Does patient meet criteria for Severe Protein Calorie Malnutrition?  Yes [ ]  No [ ]  PPSV 30% or below [ ]  Anasarca [ ]  Albumin < 2 [ ] Catabolic State [ ] Poor nutritional intake [ x] Significant weight loss [ ]      Palliative Performance Status Version 2:   30-40      %  ECOG - 3       Vital Signs Last 24 Hrs  T(C): 39.1 (20 Aug 2018 10:49), Max: 39.1 (20 Aug 2018 10:49)  T(F): 102.3 (20 Aug 2018 10:49), Max: 102.3 (20 Aug 2018 10:49)  HR: 143 (20 Aug 2018 11:58) (143 - 156)  BP: 116/48 (20 Aug 2018 11:58) (105/70 - 116/48)  BP(mean): --  RR: 35 (20 Aug 2018 11:58) (22 - 35)  SpO2: 100% (20 Aug 2018 11:58) (96% - 100%)    Physical Exam:    General: [ ] Alert,  A&O x     [ x] lethargic   [ ] Agitated   [ ] Cachexia   HEENT: [ ] Normal   [ ] Dry mouth   [ ] ET Tube    [x ] Bipap  Lungs: [x ] Clear [ ] Rhonchi  [ ] Crackles [ ] Wheezing [x ] Tachypnea  [ ] Audible excessive secretions    Cardiovascular:  [ ] Regular rate and rhythm  [ ] Irregular [x ] Tachycardia   [ ] Bradycardia   Abdomen: [ x] Soft  [ ] Distended  [ ] +BS  [x ] Non tender [ ] Tender  [ ]PEG   [ ] NGT   Last BM:     Genitourinary: [ ] Normal [x ] Incontinent   [ ] Oliguria/Anuria   [ ] Holly  Musculoskeletal:  [ ] Normal   [x ] Generalized weakness  [ ] Bedbound  [ ] Edema   Neurological: [ ] No focal deficits  [x] Cognitive impairment     Skin: [x ] Normal   [ ] Pressure ulcers     LABS:                        15.5   3.65  )-----------( 174      ( 20 Aug 2018 11:10 )             49.6     08-20    133<L>  |  97<L>  |  37<H>  ----------------------------<  372<H>  4.9   |  15<L>  |  2.08<H>    Ca    11.5<H>      20 Aug 2018 11:10    TPro  9.8<H>  /  Alb  3.5  /  TBili  1.3<H>  /  DBili  x   /  AST  54<H>  /  ALT  30  /  AlkPhos  114  08-20    I&O's Summary    RADIOLOGY & ADDITIONAL STUDIES:    EXAM:  XR CHEST PORTABLE URGENT 1V    PROCEDURE DATE:  Aug 20 2018   INTERPRETATION:  TIME OF EXAM: August 20, 2018 at 11:23 AM  CLINICAL INFORMATION: Fever  TECHNIQUE:   Portable chest    INTERPRETATION:   Sternotomy wires are present. The lungs are clear. The heart is not   enlarged and there are no effusions. Tortuosity of the aorta.  COMPARISON:  January 21, 2018    IMPRESSION:  Clear lungs.    Referrals:  Hospice [ ]   Chaplaincy [ ]    Child Life [ ]   Social Work [ ]   Case Management [ ]   Holistic Therapy [ ]

## 2018-08-20 NOTE — H&P ADULT - ASSESSMENT
Pt is a 89 yo M w/ hx alzehemers dementia oriented x 1 at baseline, MM on home hospice p/w fever tachypnea and hypotension after a possible aspiration event s/p 4 L NS +ERIKA cr 1.09 (6/2018)-->2.08 WBC <4 RR 24 bandemia-22%. UA neg CXR-no acute infiltrate

## 2018-08-20 NOTE — CONSULT NOTE ADULT - PROBLEM SELECTOR RECOMMENDATION 4
Palliative care team called for pt's symptoms. Although, pt initially on hospice; family intends on revoking Hospice Care. As per chart review pt DNR/DNI.

## 2018-08-20 NOTE — ED PROVIDER NOTE - OBJECTIVE STATEMENT
87yo M h/o DM, HTN, MM, Alzheimers, CAD s/p bypass c/b afib p/w cough, fever. Per daughter, pt was at baseline yesterday, and woke up overnight at 4am with NBNB vomiting and coughing, altered. At baseline, able to have a conversation and recognizes familiar faces. Upon arrival per EMS pt hypotensive, tachy, tachypneic, febrile. Initially bp 80/52 which improved to 106/78 with 500cc bolus. 83% on RA initially, put on 15L NRB with ems. Per daughter who is nurse, pt may have aspirated. Last hospitalization 4m ago. No h/o aspiration pna.

## 2018-08-20 NOTE — ED ADULT NURSE NOTE - NSIMPLEMENTINTERV_GEN_ALL_ED
Implemented All Fall with Harm Risk Interventions:  [place] to call system. Call bell, personal items and telephone within reach. Instruct patient to call for assistance. Room bathroom lighting operational. Non-slip footwear when patient is off stretcher. Physically safe environment: no spills, clutter or unnecessary equipment. Stretcher in lowest position, wheels locked, appropriate side rails in place. Provide visual cue, wrist band, yellow gown, etc. Monitor gait and stability. Monitor for mental status changes and reorient to person, place, and time. Review medications for side effects contributing to fall risk. Reinforce activity limits and safety measures with patient and family. Provide visual clues: red socks.

## 2018-08-20 NOTE — ED PROVIDER NOTE - ATTENDING CONTRIBUTION TO CARE
Patient is an 89 yo M with hsitory of DM, HTN, GERD, CAD s/p CABG x 3, multiple myeloma, CKD, alzheimer's here for hypotension, fever and tachycardia. History is provided by family at bedside. Patient reportedly was coughing ysterday after eating, became altered from baseline today. Received 500 cc bolus by EMS. Initial BP 80/52 improved after bolus. DNR/ DNI.     VS noted  Gen. no acute distress, Non toxic   HEENT: EOMI, mmm,   Lungs: decreased BS, poor air movement  CVS: tachycardic  Abd; Soft non tender, non distended   Ext: no edema  Skin: no rash  Neuro AAOx3 non focal clear speech  a/p:   - Ferdinand ALEXANDER Patient is an 89 yo M with history of DM, HTN, GERD, CAD s/p CABG x 3, multiple myeloma, CKD, alzheimer's dementia here for hypotension, fever and tachycardia. History is provided by patient's daughter at bedside. Patient reportedly was coughing yesterday after eating, started vomiting around 3/4 AM. He became altered from baseline today. Received 500 cc bolus by EMS. Initial BP 80/52 to 106/ 78 improved after bolus. Per daughter, patient has a history of having afib after one of his CABG procedures. Patient arrives tachypneic, tachycardic and febrile. Per daughter, patient is DNR/ DNI. No history of DVT/PE in patient. Daughter is an RN is concerned about aspiration pneumonia. I agree that this is in the differential. Sepsis protocol initiated.   pmd: Yazan Smith  VS noted  Gen. no acute distress, Non toxic   HEENT: EOMI, mmm,   Lungs: decreased BS, poor air movement  CVS: tachycardic  Abd; Soft non tender, non distended   Ext: no edema  Skin: no rash  Neuro AAOx3 non focal clear speech  a/p:   - Ferdinand ALEXANDER Patient is an 89 yo M with history of DM, HTN, GERD, CAD s/p CABG x 3, multiple myeloma, CKD, alzheimer's dementia here for hypotension, fever and tachycardia. History is provided by patient's daughter at bedside. Patient reportedly was coughing yesterday after eating, started vomiting around 3/4 AM. He became altered from baseline today. Received 500 cc bolus by EMS. Initial BP 80/52 to 106/ 78 improved after bolus. Per daughter, patient has a history of having afib after one of his CABG procedures. Patient arrives tachypneic, tachycardic and febrile. Per daughter, patient is DNR/ DNI.   pmd: Yazan Smith  VS noted  Gen. no acute distress, Non toxic   HEENT: EOMI, mmm,   Lungs: decreased BS, poor air movement  CVS: tachycardic  Abd; Soft non tender, non distended   Ext: no edema  Skin: no rash  Neuro AAOx3 non focal clear speech  a/p: tachypnea/ fever - concern for sepsis/ pneumonia  No history of DVT/PE in patient. Daughter is an RN is concerned about aspiration pneumonia. I agree that this is in the differential. Sepsis protocol initiated, including IVF. No history of CHF per daughter. Given that patient is DNI, will try bipap.   - Ferdinand ALEXANDER Patient is an 87 yo M with history of DM, HTN, GERD, CAD s/p CABG x 3, multiple myeloma, CKD, alzheimer's dementia here for hypotension, fever and tachycardia. History is provided by patient's daughter at bedside. Patient reportedly was coughing yesterday after eating, started vomiting around 3/4 AM. He became altered from baseline today. Received 500 cc bolus by EMS. Initial BP 80/52 to 106/ 78 improved after bolus. Per daughter, patient has a history of having afib after one of his CABG procedures. Patient arrives tachypneic, tachycardic and febrile. Per daughter, patient is DNR/ DNI.   pmd: Yazan Smith  VS noted  Gen. tachypneic, uncomfortable, unable to speak  HEENT: EOMI  Lungs: decreased BS, poor air movement  CVS: tachycardic  Abd; Soft non tender, non distended   Ext: no edema  Skin: no rash  Neuro awake, responds to daughter  a/p: tachypnea/ fever - concern for sepsis/ pneumonia  No history of DVT/PE in patient. Daughter is an RN is concerned about aspiration pneumonia. I agree that this is in the differential. Sepsis protocol initiated, including IVF. No history of CHF per daughter. Given that patient is DNI, will try bipap.   - Ferdinand ALEXANDER

## 2018-08-20 NOTE — CONSULT NOTE ADULT - ASSESSMENT
88 year old male with DM2, HTN, Alzheimer's dementia, CAD s/p CABG (c/b Afib however not sustained) and newly diagnosed multiple myeloma presents from hospice care for severe sepsis possibly secondary to aspiration pneumonitis vs. pneumonia.     Severe sepsis    Acute hypoxic respiratory failure 88 year old male with DM2, HTN, Alzheimer's dementia, CAD s/p CABG (c/b Afib however not sustained) and newly diagnosed multiple myeloma presents from hospice care for severe sepsis possibly secondary to aspiration pneumonitis vs. pneumonia.     # Severe sepsis    - POCUS: SVC does not appear plump, patient will still be volume responsive  - s/p 3L IV fluids; given another 1L IVF bolus STAT    # Acute hypoxic respiratory failure   - goals of care extensively discussed with family at bedside who maintain patient is DNR/DNI but would be amenable to trial of IV pressors should patient become hemodynamically unstable 88 year old male with DM2, HTN, Alzheimer's dementia, CAD s/p CABG (c/b Afib however not sustained) and newly diagnosed multiple myeloma presents from hospice care for severe sepsis possibly secondary to aspiration pneumonitis vs. pneumonia.     # Severe sepsis  - tachypnea, fever, tachycardia with likely pulmonary source in setting of aspiration event  - cont Zosyn to cover for anaerobic organisms, vancomycin as patient immunocompromised  - POCUS: SVC does not appear plump, patient will still be volume responsive  - s/p 3L IV fluids; given another 1L IVF bolus STAT  - follow up blood cultures    # Acute hypoxic respiratory failure   - tachypnea likely driven by sepsis vs. aspiration pneumonitis/PNA  - goals of care extensively discussed with family at bedside who maintain patient is DNR/DNI but would be amenable to trial of IV pressors should patient become hemodynamically unstable 88 year old male with DM2, HTN, Alzheimer's dementia, CAD s/p CABG (c/b Afib however not sustained) and newly diagnosed multiple myeloma presents from hospice care for severe sepsis possibly secondary to aspiration pneumonitis vs. pneumonia.     # Severe sepsis  - tachypnea, fever, tachycardia with likely pulmonary source in setting of aspiration event  - cont Zosyn to cover for anaerobic organisms, vancomycin as patient immunocompromised  - POCUS: SVC does not appear plump, patient will still be volume responsive  - s/p 3L IV fluids; given another 1L IVF bolus STAT  - follow up blood cultures    # Acute hypoxic respiratory failure   - tachypnea likely driven by sepsis vs. aspiration pneumonitis/PNA  - no further escalation of respiratory support as patient DNI  - goals of care extensively discussed with family at bedside; family would be amenable to trial of IV pressors should patient become hemodynamically unstable however not for prolonged course 88 year old male with DM2, HTN, Alzheimer's dementia, CAD s/p CABG (c/b Afib however not sustained) and newly diagnosed multiple myeloma presents from hospice care for severe sepsis possibly secondary to aspiration pneumonitis vs. pneumonia.     # Severe sepsis  - tachypnea, fever, tachycardia with likely pulmonary source in setting of aspiration event  - cont Zosyn to cover for anaerobic organisms, vancomycin as patient immunocompromised  - POCUS: SVC does not appear plump, patient will still be volume responsive  - s/p 3L IV fluids; given another 1L IVF bolus STAT  - follow up blood cultures    # Acute hypoxic respiratory failure   - tachypnea likely driven by sepsis vs. aspiration pneumonitis/PNA  - no further escalation of respiratory support as patient DNI  -goals of care extensively discussed with family at bedside; family would be amenable to trial of IV pressors should patient become hemodynamically unstable however not for prolonged course  - cont BiPAP 12/5 with interval ABG; de-escalate to NC as appropriate  - management of sepsis as above    Patient is not a candidate for MICU at this time given no further escalation of respiratory support beyond BiPAP per GOC discussion. Patient is hemodynamically stable and does not require pressor support.    ENE Blakely [id number]  MICU Resident [id number]  Dept. Internal Medicine

## 2018-08-20 NOTE — CONSULT NOTE ADULT - PROBLEM SELECTOR RECOMMENDATION 9
Due to patients age, and functional status, pt family decided against aggressive treatment and opted to pursue hospice. Pt's family intends on revoking hospice at this time.

## 2018-08-20 NOTE — H&P ADULT - HISTORY OF PRESENT ILLNESS
87 yo M w/ hx alzehemers Dementia at baseline oriented to person only, HTN, DM, MM (not on treatment-on home hospice) p/w SOB. At 4am had a episode of coughing with vomiting. She subsequently called hospice was unable to reach anyone for 5 hrs so she called EMS. Upon arrival EMS arrival pt was noted to be  hypotensive, tachycardic, tachypneic, febrile. Initially bp 80/52 which improved to 106/78 with 500cc bolus. 83% on RA initially, put on 15L NRB with ems. In the ED pt noted to be febrile to 102.3 tachypneic and placed on BiPAP he was seen by MICU and made DNR/DNI and felt not to be MICU candidate. Pt received empiric antibiotics and 4L NS and BP on my eval HR-69 and SBP 70/30. Daughter Eulalia who is a RN states she would like her father to be treated with Abx/IVF  but does not aggressive (does not want him to be intubated or resusitated or have central lines or  pressor support)

## 2018-08-20 NOTE — CONSULT NOTE ADULT - ASSESSMENT
88 year old man with DM, HTN, MM, Alzheimer's CAD s/p bypass c/b afib p/w cough, fever. possible aspiration PNA, found to have ERIKA and hypercalcemia

## 2018-08-20 NOTE — H&P ADULT - PROBLEM SELECTOR PLAN 1
-Sepsis Likely secondary to aspiration PNA meets svere sepsis criteria wbc<4 RR24 fever 102.3 and lactate 11  cont vanco/zosyn  -s/p 4 L bolus will repeat 1 L IVF and standing IVF after.   -case d/w Daughter Eulalia bedside at length if continues to be hypotensive after 1L bolus and tachypneic will consider comfort measures case d/w Dr Leyva over telephone.  -cont dilaudid as written by palliative care -Sepsis Likely secondary to aspiration PNA meets svere sepsis criteria wbc<4 RR24 fever 102.3 and lactate 11  cont vanco/zosyn  -s/p 4 L bolus will repeat 1 L IVF and standing IVF after.   -case d/w Daughter Eulalia bedside at length if continues to be hypotensive after 1L bolus and tachypneic will consider comfort measures case d/w Dr Leyva over telephone.  -cont dilaudid as written by palliative care case d/w palliative care attending Dr. Carlisle if continued distress and family agreeable to comfort measures will initiate Dilaudid drip 0.2mg/hr titrate up for respiratory distress.

## 2018-08-20 NOTE — ED PROVIDER NOTE - PHYSICAL EXAMINATION
Vitals: febrile, tachy, tachypneic, hypoxic  Gen: respiratory distress, shirt covered in vomitus, groaning, NRB   Head: NCAT  ENT: sclerae white, anicterus  CV: tachy, regular rhythm. Audible S1 and S2.   Pulm: unable to ascultate given pt groaning consistently  Abd: soft, normoactive BS x4, NTND,   Musculoskeletal:  No peripheral edema  Skin: no lesions or scars noted  Neurologic: responds to pain with groans

## 2018-08-20 NOTE — ED PROVIDER NOTE - PROGRESS NOTE DETAILS
Ferdinand ALEXANDER: Palliative at bedside. Patient reassessed - appears improved on bipap. HR improving, now 125 on monitor. Mark ALEXANDER: pt's family rescinding hospice. MICU at bedside to evaluate pt. Pt now on third liter of IVF. HR improving. awaiting MICU for dispo. Ferdinand ALEXANDER: Patient seen by MICU attending, Dr. Mcneil. No indication for MICU at this time. Patient can be admitted to tele. Mark MD: pt bp and HR improving with 3rd bolus IVF

## 2018-08-20 NOTE — CONSULT NOTE ADULT - PROBLEM SELECTOR RECOMMENDATION 2
baseline ~ 1.2-1.3  check PTH, phos level  monitor bmp baseline ~ 1.2-1.3  likely sec to HTN/DM  check PTH, phos level  monitor bmp

## 2018-08-20 NOTE — ED PROVIDER NOTE - MEDICAL DECISION MAKING DETAILS
89yo M h/o DM, HTN, MM, Alzheimers, CAD s/p bypass c/b afib p/w cough, fever. Pt in respiratory distress, septic, likely from aspiration pna. vanc, zosyn, ivf, labs, ua, uculture, ekg, admit.

## 2018-08-20 NOTE — CONSULT NOTE ADULT - PROBLEM SELECTOR RECOMMENDATION 4
acceptable  monitor  was very hypotensive s/p multiple NS bolus sec to hyperglycemia  optimize glucose control

## 2018-08-21 DIAGNOSIS — A41.51 SEPSIS DUE TO ESCHERICHIA COLI [E. COLI]: ICD-10-CM

## 2018-08-21 DIAGNOSIS — R50.9 FEVER, UNSPECIFIED: ICD-10-CM

## 2018-08-21 DIAGNOSIS — R78.81 BACTEREMIA: ICD-10-CM

## 2018-08-21 DIAGNOSIS — J69.0 PNEUMONITIS DUE TO INHALATION OF FOOD AND VOMIT: ICD-10-CM

## 2018-08-21 LAB
ALBUMIN SERPL ELPH-MCNC: 1.9 G/DL — LOW (ref 3.3–5)
ALP SERPL-CCNC: 57 U/L — SIGNIFICANT CHANGE UP (ref 40–120)
ALT FLD-CCNC: 26 U/L — SIGNIFICANT CHANGE UP (ref 4–41)
ANISOCYTOSIS BLD QL: SLIGHT — SIGNIFICANT CHANGE UP
AST SERPL-CCNC: 68 U/L — HIGH (ref 4–40)
BASOPHILS # BLD AUTO: 0.07 K/UL — SIGNIFICANT CHANGE UP (ref 0–0.2)
BASOPHILS NFR BLD AUTO: 1 % — SIGNIFICANT CHANGE UP (ref 0–2)
BASOPHILS NFR SPEC: 0 % — SIGNIFICANT CHANGE UP (ref 0–2)
BILIRUB SERPL-MCNC: 1.2 MG/DL — SIGNIFICANT CHANGE UP (ref 0.2–1.2)
BLASTS # FLD: 0 % — SIGNIFICANT CHANGE UP (ref 0–0)
BUN SERPL-MCNC: 26 MG/DL — HIGH (ref 7–23)
BUN SERPL-MCNC: 33 MG/DL — HIGH (ref 7–23)
CALCIUM SERPL-MCNC: 10.2 MG/DL — SIGNIFICANT CHANGE UP (ref 8.4–10.5)
CALCIUM SERPL-MCNC: 9.6 MG/DL — SIGNIFICANT CHANGE UP (ref 8.4–10.5)
CHLORIDE SERPL-SCNC: 111 MMOL/L — HIGH (ref 98–107)
CHLORIDE SERPL-SCNC: 112 MMOL/L — HIGH (ref 98–107)
CO2 SERPL-SCNC: 13 MMOL/L — LOW (ref 22–31)
CO2 SERPL-SCNC: 22 MMOL/L — SIGNIFICANT CHANGE UP (ref 22–31)
CREAT SERPL-MCNC: 1.61 MG/DL — HIGH (ref 0.5–1.3)
CREAT SERPL-MCNC: 1.8 MG/DL — HIGH (ref 0.5–1.3)
EOSINOPHIL # BLD AUTO: 0 K/UL — SIGNIFICANT CHANGE UP (ref 0–0.5)
EOSINOPHIL NFR BLD AUTO: 0 % — SIGNIFICANT CHANGE UP (ref 0–6)
EOSINOPHIL NFR FLD: 0 % — SIGNIFICANT CHANGE UP (ref 0–6)
GIANT PLATELETS BLD QL SMEAR: PRESENT — SIGNIFICANT CHANGE UP
GLUCOSE BLDC GLUCOMTR-MCNC: 128 MG/DL — HIGH (ref 70–99)
GLUCOSE BLDC GLUCOMTR-MCNC: 155 MG/DL — HIGH (ref 70–99)
GLUCOSE BLDC GLUCOMTR-MCNC: 161 MG/DL — HIGH (ref 70–99)
GLUCOSE BLDC GLUCOMTR-MCNC: 172 MG/DL — HIGH (ref 70–99)
GLUCOSE BLDC GLUCOMTR-MCNC: 200 MG/DL — HIGH (ref 70–99)
GLUCOSE SERPL-MCNC: 137 MG/DL — HIGH (ref 70–99)
GLUCOSE SERPL-MCNC: 193 MG/DL — HIGH (ref 70–99)
HBA1C BLD-MCNC: 7.3 % — HIGH (ref 4–5.6)
HCT VFR BLD CALC: 38.7 % — LOW (ref 39–50)
HGB BLD-MCNC: 12.4 G/DL — LOW (ref 13–17)
IMM GRANULOCYTES # BLD AUTO: 0.13 # — SIGNIFICANT CHANGE UP
IMM GRANULOCYTES NFR BLD AUTO: 1.9 % — HIGH (ref 0–1.5)
LYMPHOCYTES # BLD AUTO: 2.13 K/UL — SIGNIFICANT CHANGE UP (ref 1–3.3)
LYMPHOCYTES # BLD AUTO: 31.6 % — SIGNIFICANT CHANGE UP (ref 13–44)
LYMPHOCYTES NFR SPEC AUTO: 19.3 % — SIGNIFICANT CHANGE UP (ref 13–44)
MACROCYTES BLD QL: SLIGHT — SIGNIFICANT CHANGE UP
MAGNESIUM SERPL-MCNC: 1.3 MG/DL — LOW (ref 1.6–2.6)
MCHC RBC-ENTMCNC: 28.4 PG — SIGNIFICANT CHANGE UP (ref 27–34)
MCHC RBC-ENTMCNC: 32 % — SIGNIFICANT CHANGE UP (ref 32–36)
MCV RBC AUTO: 88.8 FL — SIGNIFICANT CHANGE UP (ref 80–100)
METAMYELOCYTES # FLD: 2.6 % — HIGH (ref 0–1)
METHOD TYPE: SIGNIFICANT CHANGE UP
MICROCYTES BLD QL: SLIGHT — SIGNIFICANT CHANGE UP
MONOCYTES # BLD AUTO: 0.36 K/UL — SIGNIFICANT CHANGE UP (ref 0–0.9)
MONOCYTES NFR BLD AUTO: 5.3 % — SIGNIFICANT CHANGE UP (ref 2–14)
MONOCYTES NFR BLD: 7 % — SIGNIFICANT CHANGE UP (ref 2–9)
MYELOCYTES NFR BLD: 0.9 % — HIGH (ref 0–0)
NEUTROPHIL AB SER-ACNC: 28.1 % — LOW (ref 43–77)
NEUTROPHILS # BLD AUTO: 4.05 K/UL — SIGNIFICANT CHANGE UP (ref 1.8–7.4)
NEUTROPHILS NFR BLD AUTO: 60.2 % — SIGNIFICANT CHANGE UP (ref 43–77)
NEUTS BAND # BLD: 40.3 % — HIGH (ref 0–6)
NRBC # FLD: 0 — SIGNIFICANT CHANGE UP
ORGANISM # SPEC MICROSCOPIC CNT: SIGNIFICANT CHANGE UP
ORGANISM # SPEC MICROSCOPIC CNT: SIGNIFICANT CHANGE UP
OTHER - HEMATOLOGY %: 0 — SIGNIFICANT CHANGE UP
PHOSPHATE SERPL-MCNC: 2.4 MG/DL — LOW (ref 2.5–4.5)
PLATELET # BLD AUTO: 105 K/UL — LOW (ref 150–400)
PLATELET COUNT - ESTIMATE: SIGNIFICANT CHANGE UP
PMV BLD: 9.2 FL — SIGNIFICANT CHANGE UP (ref 7–13)
POTASSIUM SERPL-MCNC: 3.7 MMOL/L — SIGNIFICANT CHANGE UP (ref 3.5–5.3)
POTASSIUM SERPL-MCNC: 4.6 MMOL/L — SIGNIFICANT CHANGE UP (ref 3.5–5.3)
POTASSIUM SERPL-SCNC: 3.7 MMOL/L — SIGNIFICANT CHANGE UP (ref 3.5–5.3)
POTASSIUM SERPL-SCNC: 4.6 MMOL/L — SIGNIFICANT CHANGE UP (ref 3.5–5.3)
PROMYELOCYTES # FLD: 0 % — SIGNIFICANT CHANGE UP (ref 0–0)
PROT SERPL-MCNC: 6.9 G/DL — SIGNIFICANT CHANGE UP (ref 6–8.3)
RBC # BLD: 4.36 M/UL — SIGNIFICANT CHANGE UP (ref 4.2–5.8)
RBC # FLD: 15.4 % — HIGH (ref 10.3–14.5)
SMUDGE CELLS # BLD: PRESENT — SIGNIFICANT CHANGE UP
SODIUM SERPL-SCNC: 138 MMOL/L — SIGNIFICANT CHANGE UP (ref 135–145)
SODIUM SERPL-SCNC: 142 MMOL/L — SIGNIFICANT CHANGE UP (ref 135–145)
SPECIMEN SOURCE: SIGNIFICANT CHANGE UP
SPECIMEN SOURCE: SIGNIFICANT CHANGE UP
VARIANT LYMPHS # BLD: 1.8 % — SIGNIFICANT CHANGE UP
WBC # BLD: 6.74 K/UL — SIGNIFICANT CHANGE UP (ref 3.8–10.5)
WBC # FLD AUTO: 6.74 K/UL — SIGNIFICANT CHANGE UP (ref 3.8–10.5)

## 2018-08-21 PROCEDURE — 99222 1ST HOSP IP/OBS MODERATE 55: CPT

## 2018-08-21 PROCEDURE — 99233 SBSQ HOSP IP/OBS HIGH 50: CPT

## 2018-08-21 RX ORDER — SODIUM BICARBONATE 1 MEQ/ML
0.29 SYRINGE (ML) INTRAVENOUS
Qty: 150 | Refills: 0 | Status: DISCONTINUED | OUTPATIENT
Start: 2018-08-21 | End: 2018-08-22

## 2018-08-21 RX ORDER — HALOPERIDOL DECANOATE 100 MG/ML
1 INJECTION INTRAMUSCULAR ONCE
Qty: 0 | Refills: 0 | Status: COMPLETED | OUTPATIENT
Start: 2018-08-21 | End: 2018-08-21

## 2018-08-21 RX ADMIN — Medication 100 MEQ/KG/HR: at 11:01

## 2018-08-21 RX ADMIN — Medication 1: at 01:17

## 2018-08-21 RX ADMIN — Medication 1: at 18:27

## 2018-08-21 RX ADMIN — Medication 100 MEQ/KG/HR: at 23:22

## 2018-08-21 RX ADMIN — HALOPERIDOL DECANOATE 1 MILLIGRAM(S): 100 INJECTION INTRAMUSCULAR at 23:22

## 2018-08-21 RX ADMIN — HYDROMORPHONE HYDROCHLORIDE 0.2 MILLIGRAM(S): 2 INJECTION INTRAMUSCULAR; INTRAVENOUS; SUBCUTANEOUS at 15:38

## 2018-08-21 RX ADMIN — PIPERACILLIN AND TAZOBACTAM 25 GRAM(S): 4; .5 INJECTION, POWDER, LYOPHILIZED, FOR SOLUTION INTRAVENOUS at 00:15

## 2018-08-21 RX ADMIN — PIPERACILLIN AND TAZOBACTAM 25 GRAM(S): 4; .5 INJECTION, POWDER, LYOPHILIZED, FOR SOLUTION INTRAVENOUS at 21:12

## 2018-08-21 RX ADMIN — PIPERACILLIN AND TAZOBACTAM 25 GRAM(S): 4; .5 INJECTION, POWDER, LYOPHILIZED, FOR SOLUTION INTRAVENOUS at 13:13

## 2018-08-21 RX ADMIN — Medication 1: at 13:13

## 2018-08-21 RX ADMIN — SODIUM CHLORIDE 125 MILLILITER(S): 9 INJECTION, SOLUTION INTRAVENOUS at 05:09

## 2018-08-21 RX ADMIN — PIPERACILLIN AND TAZOBACTAM 25 GRAM(S): 4; .5 INJECTION, POWDER, LYOPHILIZED, FOR SOLUTION INTRAVENOUS at 05:09

## 2018-08-21 RX ADMIN — Medication 250 MILLIGRAM(S): at 13:13

## 2018-08-21 RX ADMIN — Medication 1: at 08:01

## 2018-08-21 NOTE — PROVIDER CONTACT NOTE (OTHER) - ACTION/TREATMENT ORDERED:
Administer 1mg haldol IV push
MAR states that he cannot remove tele service, will f/u w/ bedboard to D/C.  will put BiPaP orders
continue w/ ordered abx at this time

## 2018-08-21 NOTE — CONSULT NOTE ADULT - SUBJECTIVE AND OBJECTIVE BOX
Emir Cox MD  Interventional Cardiology / Advance Heart Failure and Cardiac Transplant Specialist  [place] Office : [address] [place] [address] [place] [address]  Tel:   [place] Office : [address] [facility] [place] [address]  Tel: [phone]  Cell : [phone]    HISTORY OF PRESENT ILLNESS:  89 yo M w/ hx alzehemers Dementia at baseline oriented to person only, HTN, DM, MM (not on treatment-on home hospice) p/w SOB. At 4am had a episode of coughing with vomiting. She subsequently called hospice was unable to reach anyone for 5 hrs so she called EMS. Upon arrival EMS arrival pt was noted to be  hypotensive, tachycardic, tachypneic, febrile. Initially bp 80/52 which improved to 106/78 with 500cc bolus. 83% on RA initially, put on 15L NRB with ems. In the ED pt noted to be febrile to 102.3 tachypneic and placed on BiPAP he was seen by MICU and made DNR/DNI and felt not to be MICU candidate. Pt received empiric antibiotics and 4L NS and BP on my eval HR-69 and SBP 70/30. Daughter Eulalia who is a RN states she would like her father to be treated with Abx/IVF  but does not aggressive (does not want him to be intubated or resusitated or have central lines or  pressor support)    PAST MEDICAL & SURGICAL HISTORY:  Hep C w/o coma, chronic  Tubular adenoma of colon  Gastritis: + h pylori  DM (diabetes mellitus)  GERD (gastroesophageal reflux disease)  HTN (hypertension)  History of subdural hematoma (post traumatic)  S/P CABG x 3  No Past Surgical History    	    MEDICATIONS:    piperacillin/tazobactam IVPB. 3.375 Gram(s) IV Intermittent every 8 hours  vancomycin  IVPB 1000 milliGRAM(s) IV Intermittent every 24 hours      acetaminophen  Suppository 650 milliGRAM(s) Rectal every 6 hours PRN  acetaminophen  Suppository. 650 milliGRAM(s) Rectal every 6 hours PRN  HYDROmorphone  Injectable 0.2 milliGRAM(s) IV Push every 3 hours PRN  ondansetron Injectable 4 milliGRAM(s) IV Push every 6 hours PRN      dextrose 40% Gel 15 Gram(s) Oral once PRN  dextrose 50% Injectable 12.5 Gram(s) IV Push once  dextrose 50% Injectable 25 Gram(s) IV Push once  dextrose 50% Injectable 25 Gram(s) IV Push once  glucagon  Injectable 1 milliGRAM(s) IntraMuscular once PRN  insulin lispro (HumaLOG) corrective regimen sliding scale   SubCutaneous every 6 hours    dextrose 5%. 1000 milliLiter(s) IV Continuous <Continuous>  sodium bicarbonate  Infusion 0.289 mEq/kG/Hr IV Continuous <Continuous>      FAMILY HISTORY:  No pertinent family history in first degree relatives        Allergies    No Known Allergies    Intolerances    	      PHYSICAL EXAM:  T(C): 36.9 (08-21-18 @ 05:05), Max: 37.6 (08-20-18 @ 13:59)  HR: 108 (08-21-18 @ 05:05) (93 - 125)  BP: 101/81 (08-21-18 @ 05:05) (69/30 - 144/96)  RR: 19 (08-21-18 @ 05:05) (19 - 28)  SpO2: 97% (08-21-18 @ 05:05) (96% - 100%)  Wt(kg): --  I&O's Summary      Appearance: Normal	  HEENT:   Normal oral mucosa, PERRL, EOMI	  Cardiovascular: Normal S1 S2, No JVD, No murmurs, No edema  Respiratory:B/L RHONCHI  Gastrointestinal:  Soft, Non-tender, + BS	  Extremities: Normal range of motion, No clubbing, cyanosis or edema    LABS:	 	    CARDIAC MARKERS:                                  12.4   6.74  )-----------( 105      ( 21 Aug 2018 06:00 )             38.7     08-21    138  |  112<H>  |  33<H>  ----------------------------<  193<H>  4.6   |  13<L>  |  1.80<H>    Ca    9.6      21 Aug 2018 06:00    TPro  6.9  /  Alb  1.9<L>  /  TBili  1.2  /  DBili  x   /  AST  68<H>  /  ALT  26  /  AlkPhos  57  08-21    proBNP:   Lipid Profile:   HgA1c: Hemoglobin A1C, Whole Blood: 7.3 % (08-21 @ 06:00)    TSH:     ASSESSMENT/PLAN:

## 2018-08-21 NOTE — CONSULT NOTE ADULT - PROBLEM SELECTOR RECOMMENDATION 9
-cont zosyn 375 gm iv q8  -f/u blood cx results  -urine cx testing  -check abd USG  -source likely  vs GI vs biliary  -elevated bili but now normal  -DC vanco

## 2018-08-21 NOTE — PROGRESS NOTE ADULT - SUBJECTIVE AND OBJECTIVE BOX
INTERVAL HPI/OVERNIGHT EVENTS:  Minimally arrousable.  No active respiratory distress.   No secretions.    Allergies    No Known Allergies    Intolerances    ADVANCE DIRECTIVES:    DNR: [x ] YES [ ] NO           PRESENT SYMPTOMS:   SOURCE:  [ ] Patient   [ ] Family   [ x] Team     Pain: none    Dyspnea:  [ ] YES [ ] NO  Anxiety:  [ ] YES [ ] NO  Fatigue: [ ] YES [ ] NO  Nausea: [ ] YES [ ] NO  Loss of Appetite: [ ] YES [ ] NO  Constipation [ ] YES   [ ] No     OTHER SYMPTOMS:  [ ] All other ROS negative     [ x] Unable to obtain due to poor mentation    MEDICATIONS  (STANDING):  dextrose 5%. 1000 milliLiter(s) (50 mL/Hr) IV Continuous <Continuous>  dextrose 50% Injectable 12.5 Gram(s) IV Push once  dextrose 50% Injectable 25 Gram(s) IV Push once  dextrose 50% Injectable 25 Gram(s) IV Push once  insulin lispro (HumaLOG) corrective regimen sliding scale   SubCutaneous every 6 hours  piperacillin/tazobactam IVPB. 3.375 Gram(s) IV Intermittent every 8 hours  sodium bicarbonate  Infusion 0.289 mEq/kG/Hr (100 mL/Hr) IV Continuous <Continuous>    MEDICATIONS  (PRN):  acetaminophen  Suppository 650 milliGRAM(s) Rectal every 6 hours PRN For Temp greater than 38 C (100.4 F)  acetaminophen  Suppository. 650 milliGRAM(s) Rectal every 6 hours PRN Mild Pain (1 - 3)  dextrose 40% Gel 15 Gram(s) Oral once PRN Blood Glucose LESS THAN 70 milliGRAM(s)/deciliter  glucagon  Injectable 1 milliGRAM(s) IntraMuscular once PRN Glucose LESS THAN 70 milligrams/deciliter  HYDROmorphone  Injectable 0.2 milliGRAM(s) IV Push every 3 hours PRN tachypnic / sob  ondansetron Injectable 4 milliGRAM(s) IV Push every 6 hours PRN Nausea      Karnofsky Performance Score/Palliative Performance Status Version 2:    20     %  Protein Calorie Malnutrition:  [ ] Mild   [ ] Moderate   [ x] Severe     Physical Exam:    General: [ ] Alert,  A&O x     [x ] lethargic   [ ] Agitated   [ ] Cachexia   HEENT: [ ] Normal   [x ] Dry mouth   [ ] ET Tube    [ ] Trach   Lungs: [x ] Clear [ ] Rhonchi  [ ] Crackles [ ] Wheezing [ ] Tachypnea  [ ] Audible excessive secretions   Cardiovascular:  [x ] Regular rate and rhythm  [ ] Irregular [ ] Tachycardia   [ ] Bradycardia   Abdomen: [x ] Soft  [ ] Distended  [ ]  [ ] +BS  [ ] Non tender [ ] Tender  [ ]PEG   [ ] NGT   Last BM:     Genitourinary:  [ ] Normal [x ] Incontinent   [ ] Oliguria/Anuria   [ ] Holly  Musculoskeletal:  [ ] Normal   [ ] Generalized weakness  [x ] Bedbound   Neurological: [ ] No focal deficits  [ x] Cognitive impairment     Skin: [ x] Normal   [ ] Pressure ulcers     Vital Signs Last 24 Hrs  T(C): 37.3 (22 Aug 2018 05:28), Max: 37.3 (21 Aug 2018 21:11)  T(F): 99.1 (22 Aug 2018 05:28), Max: 99.1 (21 Aug 2018 21:11)  HR: 100 (22 Aug 2018 05:33) (75 - 111)  BP: 99/46 (22 Aug 2018 05:28) (95/48 - 113/57)  BP(mean): --  RR: 19 (22 Aug 2018 05:28) (18 - 19)  SpO2: 100% (22 Aug 2018 05:28) (96% - 100%)    LABS:                        11.2   12.74 )-----------( 105      ( 22 Aug 2018 06:04 )             33.7     08-22    143  |  108<H>  |  22  ----------------------------<  156<H>  3.2<L>   |  24  |  1.46<H>    Ca    10.2      22 Aug 2018 06:04  Phos  1.6     08-22  Mg     1.3     08-22    TPro  6.9  /  Alb  1.9<L>  /  TBili  1.2  /  DBili  x   /  AST  68<H>  /  ALT  26  /  AlkPhos  57  08-21      Urinalysis Basic - ( 20 Aug 2018 15:10 )    Color: YELLOW / Appearance: HAZY / S.021 / pH: 5.5  Gluc: TRACE / Ketone: NEGATIVE  / Bili: SMALL / Urobili: 1   Blood: NEGATIVE / Protein: 30 / Nitrite: NEGATIVE   Leuk Esterase: NEGATIVE / RBC: 3-5 / WBC 2-5   Sq Epi: OCC / Non Sq Epi: x / Bacteria: FEW      I&O's Summary      RADIOLOGY & ADDITIONAL STUDIES:

## 2018-08-21 NOTE — PROGRESS NOTE ADULT - PROBLEM SELECTOR PLAN 1
Disseminated disease.  No further disease modifying interventions are offered due to poor ECOG, nutritional and functional status.  Overall prognosis is poor.  Family is still interested in hospice care, however not [facility].

## 2018-08-21 NOTE — CONSULT NOTE ADULT - PROBLEM SELECTOR RECOMMENDATION 3
PPS 40%. Skin care PRN. Assist with ADLS as needed
hx of multiple myeloma   check PTH, vit D  hema/onc eval  palliative on board
-due to above  -cont to trend

## 2018-08-21 NOTE — PROGRESS NOTE ADULT - PROBLEM SELECTOR PLAN 1
ERIKA likely hemodynamically mediated in setting of hypotension and hypercalcemia.   Renal function improving   check urine cr, na, UA  check renal US r/o obstruction  s/p 3L NS bolus   continue IVF, monitor fluid status   monitor BMP. repeat BMP at 3pm ERIKA likely hemodynamically mediated in setting of hypotension and hypercalcemia.   Renal function improving  on IVF  check urine cr, na, UA  check renal US r/o obstruction  continue IVF, monitor fluid status  (change to bicarb drip)  monitor BMP. strict I/O   avoid nephrotoxics, NSAIDS RCA

## 2018-08-21 NOTE — PROGRESS NOTE ADULT - PROBLEM SELECTOR PLAN 2
Resolved at this time.   Likely secondary to severe sepsis/shock.  Currently on Dilaudid 0.2mg IV Q3 hours prn.

## 2018-08-21 NOTE — PROGRESS NOTE ADULT - PROBLEM SELECTOR PLAN 1
-Sepsis Likely secondary to aspiration PNA meets severe sepsis criteria wbc<4 RR24 fever 102.3 and lactate 11  cont vanco/zosyn  -id f/u

## 2018-08-21 NOTE — PROGRESS NOTE ADULT - SUBJECTIVE AND OBJECTIVE BOX
[facility]   MD RUDDY BACH MD ANGELA WONG, PA    TEL:  OFFICE: [phone]  DR FRANCES CELL: [phone]  DR. VILLALTA CELL: [phone]  SHAYNA BORREGO CELL: [phone]        Patient is a 88y old  Male who presents with a chief complaint of SOB (20 Aug 2018 18:19)      Patient seen and examined at bedside.     VITALS:  T(F): 98.3 (08-21-18 @ 13:10), Max: 99.6 (08-20-18 @ 13:59)  HR: 100 (08-21-18 @ 13:10)  BP: 95/48 (08-21-18 @ 13:10)  RR: 19 (08-21-18 @ 13:10)  SpO2: 99% (08-21-18 @ 13:10)  Wt(kg): --    Height (cm): 160.02 (08-20 @ 20:20)  Weight (kg): 51.9 (08-20 @ 20:20)  BMI (kg/m2): 20.3 (08-20 @ 20:20)  BSA (m2): 1.53 (08-20 @ 20:20)    PHYSICAL EXAM:  Constitutional: still on bipap,   Neck: No JVD  Respiratory: slight crackles  Cardiovascular: S1, S2, RRR  Gastrointestinal: BS+, soft, NT/ND  Extremities: No peripheral edema    Hospital Medications:   MEDICATIONS  (STANDING):  dextrose 5%. 1000 milliLiter(s) (50 mL/Hr) IV Continuous <Continuous>  dextrose 50% Injectable 12.5 Gram(s) IV Push once  dextrose 50% Injectable 25 Gram(s) IV Push once  dextrose 50% Injectable 25 Gram(s) IV Push once  insulin lispro (HumaLOG) corrective regimen sliding scale   SubCutaneous every 6 hours  piperacillin/tazobactam IVPB. 3.375 Gram(s) IV Intermittent every 8 hours  sodium bicarbonate  Infusion 0.289 mEq/kG/Hr (100 mL/Hr) IV Continuous <Continuous>  vancomycin  IVPB 1000 milliGRAM(s) IV Intermittent every 24 hours      LABS:  08-21    138  |  112<H>  |  33<H>  ----------------------------<  193<H>  4.6   |  13<L>  |  1.80<H>    Ca    9.6      21 Aug 2018 06:00    TPro  6.9  /  Alb  1.9<L>  /  TBili  1.2  /  DBili      /  AST  68<H>  /  ALT  26  /  AlkPhos  57  08-21    Creatinine Trend: 1.80 <--, 2.08 <--    Albumin, Serum: 1.9 g/dL (08-21 @ 06:00)                              12.4   6.74  )-----------( 105      ( 21 Aug 2018 06:00 )             38.7     Urine Studies:  Urinalysis - [08-20-18 @ 15:10]      Color YELLOW / Appearance HAZY / SG 1.021 / pH 5.5      Gluc TRACE / Ketone NEGATIVE  / Bili SMALL / Urobili 1       Blood NEGATIVE / Protein 30 / Leuk Est NEGATIVE / Nitrite NEGATIVE      RBC 3-5 / WBC 2-5 / Hyaline  / Gran  / Sq Epi OCC / Non Sq Epi  / Bacteria FEW      PTH 5.92 (Ca --)      [06-01-18 @ 06:10]   --  Vitamin D (25OH) 42.8      [06-01-18 @ 06:10]  HbA1c 7.3      [08-21-18 @ 06:00]  TSH 1.18      [06-04-18 @ 05:20]        RADIOLOGY & ADDITIONAL STUDIES: [facility]   MD RUDDY BACH MD ANGELA WONG, PA    TEL:  OFFICE: [phone]  DR FRANCES CELL: [phone]  DR. VILLALTA CELL: [phone]  SHAYNA BORREGO CELL: [phone]        Patient is a 88y old  Male who presents with a chief complaint of SOB (      Patient seen and examined at bedside. Curently on Bipap    VITALS:  T(F): 98.3 (08-21-18 @ 13:10), Max: 99.6 (08-20-18 @ 13:59)  HR: 100 (08-21-18 @ 13:10)  BP: 95/48 (08-21-18 @ 13:10)  RR: 19 (08-21-18 @ 13:10)  SpO2: 99% (08-21-18 @ 13:10)  Wt(kg): --    Height (cm): 160.02 (08-20 @ 20:20)  Weight (kg): 51.9 (08-20 @ 20:20)  BMI (kg/m2): 20.3 (08-20 @ 20:20)  BSA (m2): 1.53 (08-20 @ 20:20)    PHYSICAL EXAM:  Constitutional: male  on bipap,   Neck: No JVD  Respiratory: slight crackles b/l   Cardiovascular: S1, S2, RRR  Gastrointestinal: BS+, soft, NT/ND  Extremities: No peripheral edema    Hospital Medications:   MEDICATIONS  (STANDING):  dextrose 5%. 1000 milliLiter(s) (50 mL/Hr) IV Continuous <Continuous>  dextrose 50% Injectable 12.5 Gram(s) IV Push once  dextrose 50% Injectable 25 Gram(s) IV Push once  dextrose 50% Injectable 25 Gram(s) IV Push once  insulin lispro (HumaLOG) corrective regimen sliding scale   SubCutaneous every 6 hours  piperacillin/tazobactam IVPB. 3.375 Gram(s) IV Intermittent every 8 hours  sodium bicarbonate  Infusion 0.289 mEq/kG/Hr (100 mL/Hr) IV Continuous <Continuous>  vancomycin  IVPB 1000 milliGRAM(s) IV Intermittent every 24 hours      LABS:  08-21    138  |  112<H>  |  33<H>  ----------------------------<  193<H>  4.6   |  13<L>  |  1.80<H>    Ca    9.6      21 Aug 2018 06:00    TPro  6.9  /  Alb  1.9<L>  /  TBili  1.2  /  DBili      /  AST  68<H>  /  ALT  26  /  AlkPhos  57  08-21    Creatinine Trend: 1.80 <--, 2.08 <--    Albumin, Serum: 1.9 g/dL (08-21 @ 06:00)                              12.4   6.74  )-----------( 105      ( 21 Aug 2018 06:00 )             38.7     Urine Studies:  Urinalysis - [08-20-18 @ 15:10]      Color YELLOW / Appearance HAZY / SG 1.021 / pH 5.5      Gluc TRACE / Ketone NEGATIVE  / Bili SMALL / Urobili 1       Blood NEGATIVE / Protein 30 / Leuk Est NEGATIVE / Nitrite NEGATIVE      RBC 3-5 / WBC 2-5 / Hyaline  / Gran  / Sq Epi OCC / Non Sq Epi  / Bacteria FEW      PTH 5.92 (Ca --)      [06-01-18 @ 06:10]   --  Vitamin D (25OH) 42.8      [06-01-18 @ 06:10]  HbA1c 7.3      [08-21-18 @ 06:00]  TSH 1.18      [06-04-18 @ 05:20]        RADIOLOGY & ADDITIONAL STUDIES:

## 2018-08-21 NOTE — CONSULT NOTE ADULT - SUBJECTIVE AND OBJECTIVE BOX
ASPEN AVILEZ 88y Male  MRN-[id number]    Patient is a 88y old  Male who presents with a chief complaint of SOB (20 Aug 2018 18:19)      HPI:  89 yo M w/ hx alzehemers Dementia at baseline oriented to person only, HTN, DM, MM (not on treatment-on home hospice) p/w SOB. At 4am had a episode of coughing with vomiting. She subsequently called hospice was unable to reach anyone for 5 hrs so she called EMS. Upon arrival EMS arrival pt was noted to be  hypotensive, tachycardic, tachypneic, febrile. Initially bp 80/52 which improved to 106/78 with 500cc bolus. 83% on RA initially, put on 15L NRB with ems. In the ED pt noted to be febrile to 102.3 tachypneic and placed on BiPAP he was seen by MICU and made DNR/DNI and felt not to be MICU candidate. Pt received empiric antibiotics and 4L NS and BP on my eval HR-69 and SBP 70/30. Daughter Eulalia who is a RN states she would like her father to be treated with Abx/IVF  but does not aggressive (does not want him to be intubated or resusitated or have central lines or  pressor support) (20 Aug 2018 18:19)    Daughter at bedside helped with hx      PAST MEDICAL & SURGICAL HISTORY:  Hep C w/o coma, chronic  Tubular adenoma of colon  Gastritis: + h pylori  DM (diabetes mellitus)  GERD (gastroesophageal reflux disease)  HTN (hypertension)  History of subdural hematoma (post traumatic)  S/P CABG x 3  No Past Surgical History      Allergies    No Known Allergies    Intolerances        ANTIMICROBIALS:  piperacillin/tazobactam IVPB. 3.375 every 8 hours      MEDICATIONS  (STANDING):  dextrose 5%. 1000 milliLiter(s) (50 mL/Hr) IV Continuous <Continuous>  dextrose 50% Injectable 12.5 Gram(s) IV Push once  dextrose 50% Injectable 25 Gram(s) IV Push once  dextrose 50% Injectable 25 Gram(s) IV Push once  insulin lispro (HumaLOG) corrective regimen sliding scale   SubCutaneous every 6 hours  piperacillin/tazobactam IVPB. 3.375 Gram(s) IV Intermittent every 8 hours  sodium bicarbonate  Infusion 0.289 mEq/kG/Hr (100 mL/Hr) IV Continuous <Continuous>      Social History  Smoking: no  Etoh: no  Drug use: no      FAMILY HISTORY:  No pertinent family history in relation to chief complaint      Vital Signs Last 24 Hrs  T(C): 36.8 (21 Aug 2018 13:10), Max: 37.2 (20 Aug 2018 20:20)  T(F): 98.3 (21 Aug 2018 13:10), Max: 99 (20 Aug 2018 20:20)  HR: 100 (21 Aug 2018 13:10) (93 - 115)  BP: 95/48 (21 Aug 2018 13:10) (69/30 - 103/76)  BP(mean): --  RR: 19 (21 Aug 2018 13:10) (19 - 28)  SpO2: 99% (21 Aug 2018 13:10) (97% - 100%)    CBC Full  -  ( 21 Aug 2018 06:00 )  WBC Count : 6.74 K/uL  Hemoglobin : 12.4 g/dL  Hematocrit : 38.7 %  Platelet Count - Automated : 105 K/uL  Mean Cell Volume : 88.8 fL  Mean Cell Hemoglobin : 28.4 pg  Mean Cell Hemoglobin Concentration : 32.0 %  Auto Neutrophil # : 4.05 K/uL  Auto Lymphocyte # : 2.13 K/uL  Auto Monocyte # : 0.36 K/uL  Auto Eosinophil # : 0.00 K/uL  Auto Basophil # : 0.07 K/uL  Auto Neutrophil % : 60.2 %  Auto Lymphocyte % : 31.6 %  Auto Monocyte % : 5.3 %  Auto Eosinophil % : 0.0 %  Auto Basophil % : 1.0 %        138  |  112<H>  |  33<H>  ----------------------------<  193<H>  4.6   |  13<L>  |  1.80<H>    Ca    9.6      21 Aug 2018 06:00    TPro  6.9  /  Alb  1.9<L>  /  TBili  1.2  /  DBili  x   /  AST  68<H>  /  ALT  26  /  AlkPhos  57      LIVER FUNCTIONS - ( 21 Aug 2018 06:00 )  Alb: 1.9 g/dL / Pro: 6.9 g/dL / ALK PHOS: 57 u/L / ALT: 26 u/L / AST: 68 u/L / GGT: x           Urinalysis Basic - ( 20 Aug 2018 15:10 )    Color: YELLOW / Appearance: HAZY / S.021 / pH: 5.5  Gluc: TRACE / Ketone: NEGATIVE  / Bili: SMALL / Urobili: 1   Blood: NEGATIVE / Protein: 30 / Nitrite: NEGATIVE   Leuk Esterase: NEGATIVE / RBC: 3-5 / WBC 2-5   Sq Epi: OCC / Non Sq Epi: x / Bacteria: FEW        MICROBIOLOGY:    Culture - Blood (18 @ 12:53)    Specimen Source: BLOOD VENOUS    Gram Stain Blood:   ***** CRITICAL RESULT *****  PERSON CALLED / READ-BACK: ADELA ELIZONDO RN./ Y  DATE / TIME CALLED: 18 023  CALLED BY: IRMA BRAN^Gram Neg Rods  AFTER: 10 HOURS INCUBATION  BOTTLE: AEROBIC BOTTLE    Culture - Blood (18 @ 12:53)    Culture - Blood:   ***Blood Panel PCR results on this specimen are available  approximately 3 hours after the Gram stain result***  Gram stain, PCR, and/or culture results may not always  correspond due to difference in methodologies  ------------------------------------------------------------  This PCR assay was performed using [employer].  The  following targets are tested for:  Enterococcus, vancomycin  resistant enterococci, Listeria monocytogenes,  coagulase  negative staphylococci, S. aureus, methicillin resistant S.  aureus, Streptococcus agalactiae (Group B), S. pneumoniae,  S. pyogenes (Group A), Acinetobacter baumannii, Enterobacter  cloacae, E. coli, Klebsiella oxytoca, K. pneumoniae, Proteus  sp., Serratia marcescens, Haemophilus influenzae, Neisseria  meningitidis, Pseudomonas aeruginosa, Candida albicans, C.  glabrata, C. krusei, C. parapsilosis, C. tropicalis and the  KPC resistance gene.  **NOTE: Due to technical problems, Proteus sp. will NOT be  reported as part of the BCID paneluntil further notice.    -  Escherichia coli: + DETECT CHIP    Specimen Source: BLOOD PERIPHERAL    Organism: BLOOD CULTURE PCR    Gram Stain Blood:   ***** CRITICAL RESULT *****  PERSON CALLED / READ-BACK: ADELA ELIZONDO RN. / Y  DATE / TIME CALLED: 18 0224  CALLED BY: IRMA BRAN  GNCAMMY^Gram Neg Rods  AFTER: 10 HOURS INCUBATION  BOTTLE: AEROBIC   ANAEROBIC BOTTLES    Organism Identification: BLOOD CULTURE PCR    Method Type: PCR    RADIOLOGY    Xray Chest 1 View- PORTABLE-Urgent (18 @ 11:41) >  Clear lung

## 2018-08-21 NOTE — CONSULT NOTE ADULT - ATTENDING COMMENTS
Patient examined and case reviewed in detail on bedside rounds
Patient seen and examined.  Agree with NP note.
Meng Zafar  Attending Physician   Division of Infectious Disease  Pager #[phone]  After 5pm/weekend or no response, call #[phone]

## 2018-08-21 NOTE — PROGRESS NOTE ADULT - PROBLEM SELECTOR PLAN 4
sec to hyperglycemia  optimize glucose control.  better today sec to hyperglycemia  optimize glucose control.  improving

## 2018-08-21 NOTE — CONSULT NOTE ADULT - ASSESSMENT
ekg - svt @ 159 BPM     Echo 2012 - normal LV function     a/p     1) SVT - now in NSR, likely sec to aspiration pneumonia, will resume bblockers when bp ok     2) Aspiration pneumonia - on vanco and zosyn    3) CAD s/p CABG - home hospice not on ASA, no CP EKG shows no sign of ischemia

## 2018-08-21 NOTE — CONSULT NOTE ADULT - ASSESSMENT
89 yo M w/ hx alzheimer's Dementia at baseline oriented to person only, HTN, DM, MM (not on treatment-on home hospice) p/w SOB with sepsis, fever, bandemia, aspiration, GNR bacteremia

## 2018-08-21 NOTE — PROGRESS NOTE ADULT - PROBLEM SELECTOR PLAN 5
in setting of RF and Lactic acidosis  start on bicarb 150meq gtt  monitor serum co2.  repeat BMP @ 1500

## 2018-08-21 NOTE — PROGRESS NOTE ADULT - PROBLEM SELECTOR PLAN 4
Patient remains DNR.  If clinically stabilizes, will need to discuss disposition in the form of another hospice agency with patients daughter.

## 2018-08-21 NOTE — PROGRESS NOTE ADULT - SUBJECTIVE AND OBJECTIVE BOX
chief complaint : shortness of breath        SUBJECTIVE / OVERNIGHT EVENTS: unable to obtain ros from pt , pt lethargic      MEDICATIONS  (STANDING):  dextrose 5%. 1000 milliLiter(s) (50 mL/Hr) IV Continuous <Continuous>  dextrose 50% Injectable 12.5 Gram(s) IV Push once  dextrose 50% Injectable 25 Gram(s) IV Push once  dextrose 50% Injectable 25 Gram(s) IV Push once  haloperidol    Injectable 1 milliGRAM(s) IV Push once  insulin lispro (HumaLOG) corrective regimen sliding scale   SubCutaneous every 6 hours  piperacillin/tazobactam IVPB. 3.375 Gram(s) IV Intermittent every 8 hours  sodium bicarbonate  Infusion 0.289 mEq/kG/Hr (100 mL/Hr) IV Continuous <Continuous>    MEDICATIONS  (PRN):  acetaminophen  Suppository 650 milliGRAM(s) Rectal every 6 hours PRN For Temp greater than 38 C (100.4 F)  acetaminophen  Suppository. 650 milliGRAM(s) Rectal every 6 hours PRN Mild Pain (1 - 3)  dextrose 40% Gel 15 Gram(s) Oral once PRN Blood Glucose LESS THAN 70 milliGRAM(s)/deciliter  glucagon  Injectable 1 milliGRAM(s) IntraMuscular once PRN Glucose LESS THAN 70 milligrams/deciliter  HYDROmorphone  Injectable 0.2 milliGRAM(s) IV Push every 3 hours PRN tachypnic / sob  ondansetron Injectable 4 milliGRAM(s) IV Push every 6 hours PRN Nausea    Vital Signs Last 24 Hrs  T(C): 37.3 (21 Aug 2018 21:11), Max: 37.3 (21 Aug 2018 21:11)  T(F): 99.1 (21 Aug 2018 21:11), Max: 99.1 (21 Aug 2018 21:11)  HR: 104 (21 Aug 2018 21:15) (75 - 111)  BP: 113/57 (21 Aug 2018 21:11) (95/48 - 113/57)  BP(mean): --  RR: 18 (21 Aug 2018 21:11) (18 - 19)  SpO2: 98% (21 Aug 2018 21:11) (96% - 99%)    CAPILLARY BLOOD GLUCOSE      POCT Blood Glucose.: 161 mg/dL (21 Aug 2018 18:17)  POCT Blood Glucose.: 155 mg/dL (21 Aug 2018 12:22)  POCT Blood Glucose.: 172 mg/dL (21 Aug 2018 07:42)  POCT Blood Glucose.: 200 mg/dL (21 Aug 2018 01:14)    I&O's Summary    unable to obtain ros from pt - pt lethargic      PHYSICAL EXAM:  CHEST/LUNG: bronchial breath sounds + ant  HEART:  S1 , S2 +  ABDOMEN: soft, bs+  EXTREMITIES: trace edema  NEUROLOGY: lethargic      LABS:                        12.4   6.74  )-----------( 105      ( 21 Aug 2018 06:00 )             38.7     08-    142  |  111<H>  |  26<H>  ----------------------------<  137<H>  3.7   |  22  |  1.61<H>    Ca    10.2      21 Aug 2018 21:00  Phos  2.4       Mg     1.3         TPro  6.9  /  Alb  1.9<L>  /  TBili  1.2  /  DBili  x   /  AST  68<H>  /  ALT  26  /  AlkPhos  57  -          Urinalysis Basic - ( 20 Aug 2018 15:10 )    Color: YELLOW / Appearance: HAZY / S.021 / pH: 5.5  Gluc: TRACE / Ketone: NEGATIVE  / Bili: SMALL / Urobili: 1   Blood: NEGATIVE / Protein: 30 / Nitrite: NEGATIVE   Leuk Esterase: NEGATIVE / RBC: 3-5 / WBC 2-5   Sq Epi: OCC / Non Sq Epi: x / Bacteria: FEW        RADIOLOGY & ADDITIONAL TESTS:    Imaging Personally Reviewed:    Consultant(s) Notes Reviewed:      Care Discussed with Consultants/Other Providers:

## 2018-08-22 DIAGNOSIS — E87.8 OTHER DISORDERS OF ELECTROLYTE AND FLUID BALANCE, NOT ELSEWHERE CLASSIFIED: ICD-10-CM

## 2018-08-22 LAB
BACTERIA UR CULT: SIGNIFICANT CHANGE UP
BUN SERPL-MCNC: 22 MG/DL — SIGNIFICANT CHANGE UP (ref 7–23)
CALCIUM SERPL-MCNC: 10.2 MG/DL — SIGNIFICANT CHANGE UP (ref 8.4–10.5)
CHLORIDE SERPL-SCNC: 108 MMOL/L — HIGH (ref 98–107)
CO2 SERPL-SCNC: 24 MMOL/L — SIGNIFICANT CHANGE UP (ref 22–31)
CREAT SERPL-MCNC: 1.46 MG/DL — HIGH (ref 0.5–1.3)
GLUCOSE BLDC GLUCOMTR-MCNC: 135 MG/DL — HIGH (ref 70–99)
GLUCOSE BLDC GLUCOMTR-MCNC: 139 MG/DL — HIGH (ref 70–99)
GLUCOSE BLDC GLUCOMTR-MCNC: 175 MG/DL — HIGH (ref 70–99)
GLUCOSE SERPL-MCNC: 156 MG/DL — HIGH (ref 70–99)
HCT VFR BLD CALC: 33.7 % — LOW (ref 39–50)
HGB BLD-MCNC: 11.2 G/DL — LOW (ref 13–17)
MAGNESIUM SERPL-MCNC: 1.3 MG/DL — LOW (ref 1.6–2.6)
MCHC RBC-ENTMCNC: 28.4 PG — SIGNIFICANT CHANGE UP (ref 27–34)
MCHC RBC-ENTMCNC: 33.2 % — SIGNIFICANT CHANGE UP (ref 32–36)
MCV RBC AUTO: 85.3 FL — SIGNIFICANT CHANGE UP (ref 80–100)
NRBC # FLD: 0 — SIGNIFICANT CHANGE UP
ORGANISM # SPEC MICROSCOPIC CNT: SIGNIFICANT CHANGE UP
ORGANISM # SPEC MICROSCOPIC CNT: SIGNIFICANT CHANGE UP
PHOSPHATE SERPL-MCNC: 1.6 MG/DL — LOW (ref 2.5–4.5)
PLATELET # BLD AUTO: 105 K/UL — LOW (ref 150–400)
PMV BLD: 9.8 FL — SIGNIFICANT CHANGE UP (ref 7–13)
POTASSIUM SERPL-MCNC: 3.2 MMOL/L — LOW (ref 3.5–5.3)
POTASSIUM SERPL-SCNC: 3.2 MMOL/L — LOW (ref 3.5–5.3)
PTH-INTACT SERPL-MCNC: 10.35 PG/ML — LOW (ref 15–65)
RBC # BLD: 3.95 M/UL — LOW (ref 4.2–5.8)
RBC # FLD: 14.9 % — HIGH (ref 10.3–14.5)
SODIUM SERPL-SCNC: 143 MMOL/L — SIGNIFICANT CHANGE UP (ref 135–145)
SPECIMEN SOURCE: SIGNIFICANT CHANGE UP
SPECIMEN SOURCE: SIGNIFICANT CHANGE UP
WBC # BLD: 12.74 K/UL — HIGH (ref 3.8–10.5)
WBC # FLD AUTO: 12.74 K/UL — HIGH (ref 3.8–10.5)

## 2018-08-22 PROCEDURE — 99232 SBSQ HOSP IP/OBS MODERATE 35: CPT

## 2018-08-22 PROCEDURE — 76770 US EXAM ABDO BACK WALL COMP: CPT | Mod: 26

## 2018-08-22 RX ORDER — MAGNESIUM SULFATE 500 MG/ML
2 VIAL (ML) INJECTION ONCE
Qty: 0 | Refills: 0 | Status: COMPLETED | OUTPATIENT
Start: 2018-08-22 | End: 2018-08-22

## 2018-08-22 RX ORDER — ACETAMINOPHEN 500 MG
325 TABLET ORAL ONCE
Qty: 0 | Refills: 0 | Status: COMPLETED | OUTPATIENT
Start: 2018-08-22 | End: 2018-08-22

## 2018-08-22 RX ORDER — POTASSIUM PHOSPHATE, MONOBASIC POTASSIUM PHOSPHATE, DIBASIC 236; 224 MG/ML; MG/ML
15 INJECTION, SOLUTION INTRAVENOUS ONCE
Qty: 0 | Refills: 0 | Status: COMPLETED | OUTPATIENT
Start: 2018-08-22 | End: 2018-08-22

## 2018-08-22 RX ORDER — POTASSIUM CHLORIDE 20 MEQ
40 PACKET (EA) ORAL ONCE
Qty: 0 | Refills: 0 | Status: COMPLETED | OUTPATIENT
Start: 2018-08-22 | End: 2018-08-22

## 2018-08-22 RX ORDER — DEXTROSE MONOHYDRATE, SODIUM CHLORIDE, AND POTASSIUM CHLORIDE 50; .745; 4.5 G/1000ML; G/1000ML; G/1000ML
1000 INJECTION, SOLUTION INTRAVENOUS
Qty: 0 | Refills: 0 | Status: DISCONTINUED | OUTPATIENT
Start: 2018-08-22 | End: 2018-08-24

## 2018-08-22 RX ADMIN — Medication 1: at 13:22

## 2018-08-22 RX ADMIN — Medication 325 MILLIGRAM(S): at 17:52

## 2018-08-22 RX ADMIN — PIPERACILLIN AND TAZOBACTAM 25 GRAM(S): 4; .5 INJECTION, POWDER, LYOPHILIZED, FOR SOLUTION INTRAVENOUS at 14:20

## 2018-08-22 RX ADMIN — Medication 1: at 07:31

## 2018-08-22 RX ADMIN — DEXTROSE MONOHYDRATE, SODIUM CHLORIDE, AND POTASSIUM CHLORIDE 75 MILLILITER(S): 50; .745; 4.5 INJECTION, SOLUTION INTRAVENOUS at 14:20

## 2018-08-22 RX ADMIN — PIPERACILLIN AND TAZOBACTAM 25 GRAM(S): 4; .5 INJECTION, POWDER, LYOPHILIZED, FOR SOLUTION INTRAVENOUS at 22:06

## 2018-08-22 RX ADMIN — PIPERACILLIN AND TAZOBACTAM 25 GRAM(S): 4; .5 INJECTION, POWDER, LYOPHILIZED, FOR SOLUTION INTRAVENOUS at 05:27

## 2018-08-22 RX ADMIN — Medication 100 MEQ/KG/HR: at 10:44

## 2018-08-22 RX ADMIN — POTASSIUM PHOSPHATE, MONOBASIC POTASSIUM PHOSPHATE, DIBASIC 62.5 MILLIMOLE(S): 236; 224 INJECTION, SOLUTION INTRAVENOUS at 17:51

## 2018-08-22 RX ADMIN — Medication 325 MILLIGRAM(S): at 18:50

## 2018-08-22 RX ADMIN — Medication 50 GRAM(S): at 21:55

## 2018-08-22 NOTE — PROGRESS NOTE ADULT - PROBLEM SELECTOR PLAN 1
ERIKA likely hemodynamically mediated in setting of hypotension and hypercalcemia.   Renal function improving   renal US neg for obstruction   patient still NPO with bicarb improved, will d/c bicarb gtt, start NS +D5 +kcl 20meq @ 60cc/hr for maintainance until able to tolerate PO   monitor BMP. strict I/O   avoid nephrotoxics, NSAIDS RCA ERIKA likely hemodynamically mediated in setting of hypotension and hypercalcemia.   Renal function improving   renal US neg for obstruction   patient still NPO with bicarb improved, will d/c bicarb gtt, start NS +D5 +kcl 20meq @ 75cc/hr for maintenance until able to tolerate PO   monitor fluid status  monitor BMP. strict I/O   avoid nephrotoxics, NSAIDS RCA

## 2018-08-22 NOTE — PROGRESS NOTE ADULT - COMMENTS
on BIPAP, pt demented, cannot get ROS    Daughter at bedside helped with hx pt demented, cannot get ROS    Daughter at bedside helped with hx

## 2018-08-22 NOTE — PROGRESS NOTE ADULT - PROBLEM SELECTOR PLAN 1
-Sepsis Likely secondary to aspiration PNA meets severe sepsis criteria wbc<4 bacteremia+ abd ct   cont vanco/zosyn  -id f/u

## 2018-08-22 NOTE — PROGRESS NOTE ADULT - PROBLEM SELECTOR PLAN 5
in setting of RF and Lactic acidosis  now improved, will d/c bicarb gtt  monitor serum co2. in setting of RF and Lactic acidosis  now improved, d/c bicarb gtt  monitor serum co2.

## 2018-08-22 NOTE — PROGRESS NOTE ADULT - SUBJECTIVE AND OBJECTIVE BOX
INTERVAL HPI/OVERNIGHT EVENTS:  No acute events overnight.     Allergies    No Known Allergies    Intolerances        ADVANCE DIRECTIVES:    DNR: [ x] YES [ ] NO           PRESENT SYMPTOMS:   SOURCE:  [ ] Patient   [ ] Family   [ x] Team     Pain: none    Dyspnea:  [ ] YES [ ] NO  Anxiety:  [ ] YES [ ] NO  Fatigue: [ ] YES [ ] NO  Nausea: [ ] YES [ ] NO  Loss of Appetite: [ ] YES [ ] NO  Constipation [ ] YES   [ ] No     OTHER SYMPTOMS:  [ ] All other ROS negative     [ x] Unable to obtain due to poor mentation    MEDICATIONS  (STANDING):  dextrose 5% + sodium chloride 0.9% with potassium chloride 20 mEq/L 1000 milliLiter(s) (75 mL/Hr) IV Continuous <Continuous>  dextrose 5%. 1000 milliLiter(s) (50 mL/Hr) IV Continuous <Continuous>  dextrose 50% Injectable 12.5 Gram(s) IV Push once  dextrose 50% Injectable 25 Gram(s) IV Push once  dextrose 50% Injectable 25 Gram(s) IV Push once  insulin lispro (HumaLOG) corrective regimen sliding scale   SubCutaneous every 6 hours  piperacillin/tazobactam IVPB. 3.375 Gram(s) IV Intermittent every 8 hours    MEDICATIONS  (PRN):  acetaminophen  Suppository 650 milliGRAM(s) Rectal every 6 hours PRN For Temp greater than 38 C (100.4 F)  acetaminophen  Suppository. 650 milliGRAM(s) Rectal every 6 hours PRN Mild Pain (1 - 3)  dextrose 40% Gel 15 Gram(s) Oral once PRN Blood Glucose LESS THAN 70 milliGRAM(s)/deciliter  glucagon  Injectable 1 milliGRAM(s) IntraMuscular once PRN Glucose LESS THAN 70 milligrams/deciliter  HYDROmorphone  Injectable 0.2 milliGRAM(s) IV Push every 3 hours PRN tachypnic / sob  ondansetron Injectable 4 milliGRAM(s) IV Push every 6 hours PRN Nausea      Karnofsky Performance Score/Palliative Performance Status Version 2:   20-30     %  Protein Calorie Malnutrition:  [ ] Mild   [ ] Moderate   [x ] Severe     Physical Exam:    General: [ ] Alert,  A&O x     [x ] lethargic   [ ] Agitated   [ ] Cachexia   HEENT: [ ] Normal   [x ] Dry mouth   [ ] ET Tube    [ ] Trach   Lungs: [ x] Clear [ ] Rhonchi  [ ] Crackles [ ] Wheezing [ ] Tachypnea  [ ] Audible excessive secretions   Cardiovascular:  [ x] Regular rate and rhythm  [ ] Irregular [ ] Tachycardia   [ ] Bradycardia   Abdomen: [x ] Soft  [ ] Distended  [ ]  [x ] +BS  [x ] Non tender [ ] Tender  [ ]PEG   [ ] NGT   Last BM:     Genitourinary:  [ ] Normal [x ] Incontinent   [ ] Oliguria/Anuria   [ ] Holly  Musculoskeletal:  [ ] Normal   [ ] Generalized weakness  [x ] Bedbound   Neurological: [ ] No focal deficits  [ x] Cognitive impairment     Skin: [x ] Normal   [ ] Pressure ulcers     Vital Signs Last 24 Hrs  T(C): 37.1 (22 Aug 2018 22:07), Max: 37.3 (22 Aug 2018 05:28)  T(F): 98.8 (22 Aug 2018 22:07), Max: 99.1 (22 Aug 2018 05:28)  HR: 97 (22 Aug 2018 22:58) (93 - 110)  BP: 135/67 (22 Aug 2018 22:07) (98/63 - 135/67)  BP(mean): --  RR: 18 (22 Aug 2018 22:07) (17 - 19)  SpO2: 98% (22 Aug 2018 22:58) (97% - 100%)    LABS:                        11.2   12.74 )-----------( 105      ( 22 Aug 2018 06:04 )             33.7     08-22    143  |  108<H>  |  22  ----------------------------<  156<H>  3.2<L>   |  24  |  1.46<H>    Ca    10.2      22 Aug 2018 06:04  Phos  1.6     08-22  Mg     1.3     08-22    TPro  6.9  /  Alb  1.9<L>  /  TBili  1.2  /  DBili  x   /  AST  68<H>  /  ALT  26  /  AlkPhos  57  08-21        I&O's Summary      RADIOLOGY & ADDITIONAL STUDIES:

## 2018-08-22 NOTE — PROGRESS NOTE ADULT - PROBLEM SELECTOR PLAN 7
hypokalemia, supplemented with Kcl 40meq x 1 today, will change IVF with KCl , likely will need additional supplementation   hypomagnesemia: likely sec to no PO intake. supplement with mg 2g iv x1 today  hypophos: likely sec to no po intake, supplement k-phos 15 mmole iv x1  monitor daily electrolyte, supplement as needed. hypokalemia, supplemented with Kcl 40meq x 1 today, will change IVF with KCl , likely will need additional supplementation   hypomagnesemia: likely sec to decreased PO intake. supplement with mg 2g iv x1 today  hypophos: likely sec to decreased po intake, supplement k-phos 15 mmole iv x1  monitor daily electrolyte, supplement as needed.

## 2018-08-22 NOTE — PROGRESS NOTE ADULT - SUBJECTIVE AND OBJECTIVE BOX
Emir Cox MD  Interventional Cardiology / Advance Heart Failure and Cardiac Transplant Specialist  [place] Office : [address] [place] [address]  Tel:   [place] Office : [address] [facility] [place] [address]  Tel: [phone]  Cell : [phone]    Subjective : Pt lying in bed comfortable, not in distress, denies any chest pain or SOB  	  MEDICATIONS:    piperacillin/tazobactam IVPB. 3.375 Gram(s) IV Intermittent every 8 hours  acetaminophen  Suppository 650 milliGRAM(s) Rectal every 6 hours PRN  acetaminophen  Suppository. 650 milliGRAM(s) Rectal every 6 hours PRN  HYDROmorphone  Injectable 0.2 milliGRAM(s) IV Push every 3 hours PRN  ondansetron Injectable 4 milliGRAM(s) IV Push every 6 hours PRN  dextrose 40% Gel 15 Gram(s) Oral once PRN  dextrose 50% Injectable 12.5 Gram(s) IV Push once  dextrose 50% Injectable 25 Gram(s) IV Push once  dextrose 50% Injectable 25 Gram(s) IV Push once  glucagon  Injectable 1 milliGRAM(s) IntraMuscular once PRN  insulin lispro (HumaLOG) corrective regimen sliding scale   SubCutaneous every 6 hours  dextrose 5%. 1000 milliLiter(s) IV Continuous <Continuous>  sodium bicarbonate  Infusion 0.289 mEq/kG/Hr IV Continuous <Continuous>      PHYSICAL EXAM:  T(C): 36.6 (08-22-18 @ 10:35), Max: 37.3 (08-21-18 @ 21:11)  HR: 103 (08-22-18 @ 10:35) (75 - 111)  BP: 99/50 (08-22-18 @ 10:35) (99/46 - 113/57)  RR: 17 (08-22-18 @ 10:35) (17 - 19)  SpO2: 97% (08-22-18 @ 10:35) (96% - 100%)  Wt(kg): --  I&O's Summary        Appearance: Normal	  HEENT:   Normal oral mucosa, PERRL, EOMI	  Cardiovascular: Normal S1 S2, No JVD, No murmurs, No edema  Respiratory: Lungs clear to auscultation	  Gastrointestinal:  Soft, Non-tender, + BS	  Extremities: no edema                                    11.2   12.74 )-----------( 105      ( 22 Aug 2018 06:04 )             33.7     08-22    143  |  108<H>  |  22  ----------------------------<  156<H>  3.2<L>   |  24  |  1.46<H>    Ca    10.2      22 Aug 2018 06:04  Phos  1.6     08-22  Mg     1.3     08-22    TPro  6.9  /  Alb  1.9<L>  /  TBili  1.2  /  DBili  x   /  AST  68<H>  /  ALT  26  /  AlkPhos  57  08-21    proBNP:   Lipid Profile:   HgA1c:   TSH:

## 2018-08-22 NOTE — PROGRESS NOTE ADULT - SUBJECTIVE AND OBJECTIVE BOX
ASPEN Magaña 88y [id number]    Patient is a 88y old  Male who presents with a chief complaint of SOB (20 Aug 2018 18:19)      Follow Up/CC:  ID following for    Interval History/ROS:    Allergies    No Known Allergies    Intolerances        ANTIMICROBIALS:  piperacillin/tazobactam IVPB. 3.375 every 8 hours      MEDICATIONS  (STANDING):  dextrose 5% + sodium chloride 0.9% with potassium chloride 20 mEq/L 1000 milliLiter(s) (75 mL/Hr) IV Continuous <Continuous>  dextrose 5%. 1000 milliLiter(s) (50 mL/Hr) IV Continuous <Continuous>  dextrose 50% Injectable 12.5 Gram(s) IV Push once  dextrose 50% Injectable 25 Gram(s) IV Push once  dextrose 50% Injectable 25 Gram(s) IV Push once  insulin lispro (HumaLOG) corrective regimen sliding scale   SubCutaneous every 6 hours  magnesium sulfate  IVPB 2 Gram(s) IV Intermittent once  piperacillin/tazobactam IVPB. 3.375 Gram(s) IV Intermittent every 8 hours    MEDICATIONS  (PRN):  acetaminophen  Suppository 650 milliGRAM(s) Rectal every 6 hours PRN For Temp greater than 38 C (100.4 F)  acetaminophen  Suppository. 650 milliGRAM(s) Rectal every 6 hours PRN Mild Pain (1 - 3)  dextrose 40% Gel 15 Gram(s) Oral once PRN Blood Glucose LESS THAN 70 milliGRAM(s)/deciliter  glucagon  Injectable 1 milliGRAM(s) IntraMuscular once PRN Glucose LESS THAN 70 milligrams/deciliter  HYDROmorphone  Injectable 0.2 milliGRAM(s) IV Push every 3 hours PRN tachypnic / sob  ondansetron Injectable 4 milliGRAM(s) IV Push every 6 hours PRN Nausea        Vital Signs Last 24 Hrs  T(C): 37.1 (22 Aug 2018 15:43), Max: 37.3 (21 Aug 2018 21:11)  T(F): 98.7 (22 Aug 2018 15:43), Max: 99.1 (21 Aug 2018 21:11)  HR: 93 (22 Aug 2018 15:43) (75 - 111)  BP: 98/63 (22 Aug 2018 15:43) (98/63 - 113/57)  BP(mean): --  RR: 18 (22 Aug 2018 15:43) (17 - 19)  SpO2: 99% (22 Aug 2018 15:43) (96% - 100%)    CBC Full  -  ( 22 Aug 2018 06:04 )  WBC Count : 12.74 K/uL  Hemoglobin : 11.2 g/dL  Hematocrit : 33.7 %  Platelet Count - Automated : 105 K/uL  Mean Cell Volume : 85.3 fL  Mean Cell Hemoglobin : 28.4 pg  Mean Cell Hemoglobin Concentration : 33.2 %  Auto Neutrophil # : x  Auto Lymphocyte # : x  Auto Monocyte # : x  Auto Eosinophil # : x  Auto Basophil # : x  Auto Neutrophil % : x  Auto Lymphocyte % : x  Auto Monocyte % : x  Auto Eosinophil % : x  Auto Basophil % : x    08-22    143  |  108<H>  |  22  ----------------------------<  156<H>  3.2<L>   |  24  |  1.46<H>    Ca    10.2      22 Aug 2018 06:04  Phos  1.6     08-22  Mg     1.3     08-22    TPro  6.9  /  Alb  1.9<L>  /  TBili  1.2  /  DBili  x   /  AST  68<H>  /  ALT  26  /  AlkPhos  57  08-21    LIVER FUNCTIONS - ( 21 Aug 2018 06:00 )  Alb: 1.9 g/dL / Pro: 6.9 g/dL / ALK PHOS: 57 u/L / ALT: 26 u/L / AST: 68 u/L / GGT: x               MICROBIOLOGY:  URINE MIDSTREAM  08-20-18 --  --  --      BLOOD PERIPHERAL  08-20-18 --  --  BLOOD CULTURE PCR  Escherichia coli              v            RADIOLOGY ASPEN Magaña 88y [id number]    Patient is a 88y old  Male who presents with a chief complaint of SOB (20 Aug 2018 18:19)      Follow Up/CC:  ID following for bacteremia    Interval History/ROS: doing better, more awake    Allergies    No Known Allergies    Intolerances        ANTIMICROBIALS:  piperacillin/tazobactam IVPB. 3.375 every 8 hours      MEDICATIONS  (STANDING):  dextrose 5% + sodium chloride 0.9% with potassium chloride 20 mEq/L 1000 milliLiter(s) (75 mL/Hr) IV Continuous <Continuous>  dextrose 5%. 1000 milliLiter(s) (50 mL/Hr) IV Continuous <Continuous>  dextrose 50% Injectable 12.5 Gram(s) IV Push once  dextrose 50% Injectable 25 Gram(s) IV Push once  dextrose 50% Injectable 25 Gram(s) IV Push once  insulin lispro (HumaLOG) corrective regimen sliding scale   SubCutaneous every 6 hours  magnesium sulfate  IVPB 2 Gram(s) IV Intermittent once  piperacillin/tazobactam IVPB. 3.375 Gram(s) IV Intermittent every 8 hours    MEDICATIONS  (PRN):  acetaminophen  Suppository 650 milliGRAM(s) Rectal every 6 hours PRN For Temp greater than 38 C (100.4 F)  acetaminophen  Suppository. 650 milliGRAM(s) Rectal every 6 hours PRN Mild Pain (1 - 3)  dextrose 40% Gel 15 Gram(s) Oral once PRN Blood Glucose LESS THAN 70 milliGRAM(s)/deciliter  glucagon  Injectable 1 milliGRAM(s) IntraMuscular once PRN Glucose LESS THAN 70 milligrams/deciliter  HYDROmorphone  Injectable 0.2 milliGRAM(s) IV Push every 3 hours PRN tachypnic / sob  ondansetron Injectable 4 milliGRAM(s) IV Push every 6 hours PRN Nausea        Vital Signs Last 24 Hrs  T(C): 37.1 (22 Aug 2018 15:43), Max: 37.3 (21 Aug 2018 21:11)  T(F): 98.7 (22 Aug 2018 15:43), Max: 99.1 (21 Aug 2018 21:11)  HR: 93 (22 Aug 2018 15:43) (75 - 111)  BP: 98/63 (22 Aug 2018 15:43) (98/63 - 113/57)  BP(mean): --  RR: 18 (22 Aug 2018 15:43) (17 - 19)  SpO2: 99% (22 Aug 2018 15:43) (96% - 100%)    CBC Full  -  ( 22 Aug 2018 06:04 )  WBC Count : 12.74 K/uL  Hemoglobin : 11.2 g/dL  Hematocrit : 33.7 %  Platelet Count - Automated : 105 K/uL  Mean Cell Volume : 85.3 fL  Mean Cell Hemoglobin : 28.4 pg  Mean Cell Hemoglobin Concentration : 33.2 %  Auto Neutrophil # : x  Auto Lymphocyte # : x  Auto Monocyte # : x  Auto Eosinophil # : x  Auto Basophil # : x  Auto Neutrophil % : x  Auto Lymphocyte % : x  Auto Monocyte % : x  Auto Eosinophil % : x  Auto Basophil % : x    08-22    143  |  108<H>  |  22  ----------------------------<  156<H>  3.2<L>   |  24  |  1.46<H>    Ca    10.2      22 Aug 2018 06:04  Phos  1.6     08-22  Mg     1.3     08-22    TPro  6.9  /  Alb  1.9<L>  /  TBili  1.2  /  DBili  x   /  AST  68<H>  /  ALT  26  /  AlkPhos  57  08-21    LIVER FUNCTIONS - ( 21 Aug 2018 06:00 )  Alb: 1.9 g/dL / Pro: 6.9 g/dL / ALK PHOS: 57 u/L / ALT: 26 u/L / AST: 68 u/L / GGT: x               MICROBIOLOGY:  URINE MIDSTREAM  08-20-18 --  --  --      BLOOD PERIPHERAL  08-20-18 --  --  BLOOD CULTURE PCR  Escherichia coli              v            RADIOLOGY

## 2018-08-22 NOTE — PROGRESS NOTE ADULT - SUBJECTIVE AND OBJECTIVE BOX
[facility] [place] [facility]   MD RUDDY BACH MD ANGELA WONG, PA    TEL:  OFFICE: [phone]  DR FRANCES CELL: [phone]  DR. VILLALTA CELL: [phone]  SHAYNA BORREGO CELL: [phone]        Patient is a 88y old  Male who presents with a chief complaint of SOB (20 Aug 2018 18:19)      Patient seen and examined at bedside.    VITALS:  T(F): 97.9 (08-22-18 @ 10:35), Max: 99.1 (08-21-18 @ 21:11)  HR: 103 (08-22-18 @ 10:35)  BP: 99/50 (08-22-18 @ 10:35)  RR: 17 (08-22-18 @ 10:35)  SpO2: 97% (08-22-18 @ 10:35)  Wt(kg): --        PHYSICAL EXAM:  Constitutional: slightly restless, off bipap, on NC  Neck: No JVD  Respiratory: no crackles/rales appreciated   Cardiovascular: S1, S2, RRR  Gastrointestinal: BS+, soft, NT/ND  Extremities: No peripheral edema    Hospital Medications:   MEDICATIONS  (STANDING):  dextrose 5%. 1000 milliLiter(s) (50 mL/Hr) IV Continuous <Continuous>  dextrose 50% Injectable 12.5 Gram(s) IV Push once  dextrose 50% Injectable 25 Gram(s) IV Push once  dextrose 50% Injectable 25 Gram(s) IV Push once  insulin lispro (HumaLOG) corrective regimen sliding scale   SubCutaneous every 6 hours  piperacillin/tazobactam IVPB. 3.375 Gram(s) IV Intermittent every 8 hours      LABS:  08-22    143  |  108<H>  |  22  ----------------------------<  156<H>  3.2<L>   |  24  |  1.46<H>    Ca    10.2      22 Aug 2018 06:04  Phos  1.6     08-22  Mg     1.3     08-22    TPro  6.9  /  Alb  1.9<L>  /  TBili  1.2  /  DBili      /  AST  68<H>  /  ALT  26  /  AlkPhos  57  08-21    Creatinine Trend: 1.46 <--, 1.61 <--, 1.80 <--, 2.08 <--    Phosphorus Level, Serum: 1.6 mg/dL (08-22 @ 06:04)  Phosphorus Level, Serum: 2.4 mg/dL (08-21 @ 21:00)    calcium--  intact pth--  parathyroid hormone intact, serum10.35                            11.2   12.74 )-----------( 105      ( 22 Aug 2018 06:04 )             33.7     Urine Studies:  Urinalysis - [08-20-18 @ 15:10]      Color YELLOW / Appearance HAZY / SG 1.021 / pH 5.5      Gluc TRACE / Ketone NEGATIVE  / Bili SMALL / Urobili 1       Blood NEGATIVE / Protein 30 / Leuk Est NEGATIVE / Nitrite NEGATIVE      RBC 3-5 / WBC 2-5 / Hyaline  / Gran  / Sq Epi OCC / Non Sq Epi  / Bacteria FEW      PTH 10.35 (Ca --)      [08-22-18 @ 06:04]   --  PTH 5.92 (Ca --)      [06-01-18 @ 06:10]   --  Vitamin D (25OH) 42.8      [06-01-18 @ 06:10]  HbA1c 7.3      [08-21-18 @ 06:00]  TSH 1.18      [06-04-18 @ 05:20]        RADIOLOGY & ADDITIONAL STUDIES[place] [facility]   MD RUDDY BACH MD ANGELA WONG, PA    TEL:  OFFICE: [phone]  DR FRANCES CELL: [phone]  DR. VILLALTA CELL: [phone]  SHAYNA BORREGO CELL: [phone]        Patient is a 88y old  Male who presents with a chief complaint of SOB      Patient seen and examined at bedside.    VITALS:  T(F): 97.9 (08-22-18 @ 10:35), Max: 99.1 (08-21-18 @ 21:11)  HR: 103 (08-22-18 @ 10:35)  BP: 99/50 (08-22-18 @ 10:35)  RR: 17 (08-22-18 @ 10:35)  SpO2: 97% (08-22-18 @ 10:35)  Wt(kg): --        PHYSICAL EXAM:  Constitutional: slightly restless, off bipap, on NC  Neck: No JVD  Respiratory: no crackles/rales appreciated   Cardiovascular: S1, S2, RRR  Gastrointestinal: BS+, soft, NT/ND  Extremities: No peripheral edema    Hospital Medications:   MEDICATIONS  (STANDING):  dextrose 5%. 1000 milliLiter(s) (50 mL/Hr) IV Continuous <Continuous>  dextrose 50% Injectable 12.5 Gram(s) IV Push once  dextrose 50% Injectable 25 Gram(s) IV Push once  dextrose 50% Injectable 25 Gram(s) IV Push once  insulin lispro (HumaLOG) corrective regimen sliding scale   SubCutaneous every 6 hours  piperacillin/tazobactam IVPB. 3.375 Gram(s) IV Intermittent every 8 hours      LABS:  08-22    143  |  108<H>  |  22  ----------------------------<  156<H>  3.2<L>   |  24  |  1.46<H>    Ca    10.2      22 Aug 2018 06:04  Phos  1.6     08-22  Mg     1.3     08-22    TPro  6.9  /  Alb  1.9<L>  /  TBili  1.2  /  DBili      /  AST  68<H>  /  ALT  26  /  AlkPhos  57  08-21    Creatinine Trend: 1.46 <--, 1.61 <--, 1.80 <--, 2.08 <--    Phosphorus Level, Serum: 1.6 mg/dL (08-22 @ 06:04)  Phosphorus Level, Serum: 2.4 mg/dL (08-21 @ 21:00)    calcium--  intact pth--  parathyroid hormone intact, serum10.35                            11.2   12.74 )-----------( 105      ( 22 Aug 2018 06:04 )             33.7     Urine Studies:  Urinalysis - [08-20-18 @ 15:10]      Color YELLOW / Appearance HAZY / SG 1.021 / pH 5.5      Gluc TRACE / Ketone NEGATIVE  / Bili SMALL / Urobili 1       Blood NEGATIVE / Protein 30 / Leuk Est NEGATIVE / Nitrite NEGATIVE      RBC 3-5 / WBC 2-5 / Hyaline  / Gran  / Sq Epi OCC / Non Sq Epi  / Bacteria FEW      PTH 10.35 (Ca --)      [08-22-18 @ 06:04]   --  PTH 5.92 (Ca --)      [06-01-18 @ 06:10]   --  Vitamin D (25OH) 42.8      [06-01-18 @ 06:10]  HbA1c 7.3      [08-21-18 @ 06:00]  TSH 1.18      [06-04-18 @ 05:20]        RADIOLOGY & ADDITIONAL STUDIES:

## 2018-08-22 NOTE — PROGRESS NOTE ADULT - PROBLEM SELECTOR PLAN 4
sec to hyperglycemia  optimize glucose control.  improving sec to hyperglycemia  improved  continue to monitor

## 2018-08-23 LAB
-  AMIKACIN: SIGNIFICANT CHANGE UP
-  AMPICILLIN/SULBACTAM: SIGNIFICANT CHANGE UP
-  AMPICILLIN: SIGNIFICANT CHANGE UP
-  AZTREONAM: SIGNIFICANT CHANGE UP
-  CEFAZOLIN: SIGNIFICANT CHANGE UP
-  CEFEPIME: SIGNIFICANT CHANGE UP
-  CEFOXITIN: SIGNIFICANT CHANGE UP
-  CEFTAZIDIME: SIGNIFICANT CHANGE UP
-  CEFTRIAXONE: SIGNIFICANT CHANGE UP
-  CIPROFLOXACIN: SIGNIFICANT CHANGE UP
-  ERTAPENEM: SIGNIFICANT CHANGE UP
-  GENTAMICIN: SIGNIFICANT CHANGE UP
-  IMIPENEM: SIGNIFICANT CHANGE UP
-  LEVOFLOXACIN: SIGNIFICANT CHANGE UP
-  MEROPENEM: SIGNIFICANT CHANGE UP
-  PIPERACILLIN/TAZOBACTAM: SIGNIFICANT CHANGE UP
-  TIGECYCLINE: SIGNIFICANT CHANGE UP
-  TOBRAMYCIN: SIGNIFICANT CHANGE UP
-  TRIMETHOPRIM/SULFAMETHOXAZOLE: SIGNIFICANT CHANGE UP
ALBUMIN SERPL ELPH-MCNC: 1.9 G/DL — LOW (ref 3.3–5)
ALP SERPL-CCNC: 99 U/L — SIGNIFICANT CHANGE UP (ref 40–120)
ALT FLD-CCNC: 27 U/L — SIGNIFICANT CHANGE UP (ref 4–41)
AST SERPL-CCNC: 55 U/L — HIGH (ref 4–40)
BACTERIA BLD CULT: SIGNIFICANT CHANGE UP
BACTERIA BLD CULT: SIGNIFICANT CHANGE UP
BASOPHILS # BLD AUTO: 0.04 K/UL — SIGNIFICANT CHANGE UP (ref 0–0.2)
BASOPHILS NFR BLD AUTO: 0.4 % — SIGNIFICANT CHANGE UP (ref 0–2)
BILIRUB SERPL-MCNC: 1.5 MG/DL — HIGH (ref 0.2–1.2)
BUN SERPL-MCNC: 16 MG/DL — SIGNIFICANT CHANGE UP (ref 7–23)
CALCIUM SERPL-MCNC: 9.9 MG/DL — SIGNIFICANT CHANGE UP (ref 8.4–10.5)
CHLORIDE SERPL-SCNC: 108 MMOL/L — HIGH (ref 98–107)
CO2 SERPL-SCNC: 25 MMOL/L — SIGNIFICANT CHANGE UP (ref 22–31)
CREAT SERPL-MCNC: 1.19 MG/DL — SIGNIFICANT CHANGE UP (ref 0.5–1.3)
E COLI DNA BLD POS QL NAA+NON-PROBE: SIGNIFICANT CHANGE UP
EOSINOPHIL # BLD AUTO: 0.18 K/UL — SIGNIFICANT CHANGE UP (ref 0–0.5)
EOSINOPHIL NFR BLD AUTO: 1.7 % — SIGNIFICANT CHANGE UP (ref 0–6)
GLUCOSE BLDC GLUCOMTR-MCNC: 136 MG/DL — HIGH (ref 70–99)
GLUCOSE BLDC GLUCOMTR-MCNC: 146 MG/DL — HIGH (ref 70–99)
GLUCOSE BLDC GLUCOMTR-MCNC: 148 MG/DL — HIGH (ref 70–99)
GLUCOSE BLDC GLUCOMTR-MCNC: 154 MG/DL — HIGH (ref 70–99)
GLUCOSE BLDC GLUCOMTR-MCNC: 214 MG/DL — HIGH (ref 70–99)
GLUCOSE SERPL-MCNC: 172 MG/DL — HIGH (ref 70–99)
HCT VFR BLD CALC: 42.4 % — SIGNIFICANT CHANGE UP (ref 39–50)
HGB BLD-MCNC: 13.5 G/DL — SIGNIFICANT CHANGE UP (ref 13–17)
IMM GRANULOCYTES # BLD AUTO: 0.08 # — SIGNIFICANT CHANGE UP
IMM GRANULOCYTES NFR BLD AUTO: 0.7 % — SIGNIFICANT CHANGE UP (ref 0–1.5)
LYMPHOCYTES # BLD AUTO: 2.98 K/UL — SIGNIFICANT CHANGE UP (ref 1–3.3)
LYMPHOCYTES # BLD AUTO: 27.7 % — SIGNIFICANT CHANGE UP (ref 13–44)
MAGNESIUM SERPL-MCNC: 1.9 MG/DL — SIGNIFICANT CHANGE UP (ref 1.6–2.6)
MCHC RBC-ENTMCNC: 28.3 PG — SIGNIFICANT CHANGE UP (ref 27–34)
MCHC RBC-ENTMCNC: 31.8 % — LOW (ref 32–36)
MCV RBC AUTO: 88.9 FL — SIGNIFICANT CHANGE UP (ref 80–100)
METHOD TYPE: SIGNIFICANT CHANGE UP
MONOCYTES # BLD AUTO: 0.4 K/UL — SIGNIFICANT CHANGE UP (ref 0–0.9)
MONOCYTES NFR BLD AUTO: 3.7 % — SIGNIFICANT CHANGE UP (ref 2–14)
NEUTROPHILS # BLD AUTO: 7.06 K/UL — SIGNIFICANT CHANGE UP (ref 1.8–7.4)
NEUTROPHILS NFR BLD AUTO: 65.8 % — SIGNIFICANT CHANGE UP (ref 43–77)
NRBC # FLD: 0 — SIGNIFICANT CHANGE UP
PHOSPHATE SERPL-MCNC: 2.1 MG/DL — LOW (ref 2.5–4.5)
PLATELET # BLD AUTO: 122 K/UL — LOW (ref 150–400)
PMV BLD: 9.7 FL — SIGNIFICANT CHANGE UP (ref 7–13)
POTASSIUM SERPL-MCNC: 3.7 MMOL/L — SIGNIFICANT CHANGE UP (ref 3.5–5.3)
POTASSIUM SERPL-SCNC: 3.7 MMOL/L — SIGNIFICANT CHANGE UP (ref 3.5–5.3)
PROT SERPL-MCNC: 6.6 G/DL — SIGNIFICANT CHANGE UP (ref 6–8.3)
RBC # BLD: 4.77 M/UL — SIGNIFICANT CHANGE UP (ref 4.2–5.8)
RBC # FLD: 14.8 % — HIGH (ref 10.3–14.5)
SODIUM SERPL-SCNC: 143 MMOL/L — SIGNIFICANT CHANGE UP (ref 135–145)
SPECIMEN SOURCE: SIGNIFICANT CHANGE UP
WBC # BLD: 10.74 K/UL — HIGH (ref 3.8–10.5)
WBC # FLD AUTO: 10.74 K/UL — HIGH (ref 3.8–10.5)

## 2018-08-23 PROCEDURE — 74177 CT ABD & PELVIS W/CONTRAST: CPT | Mod: 26

## 2018-08-23 RX ORDER — HYDROMORPHONE HYDROCHLORIDE 2 MG/ML
0.2 INJECTION INTRAMUSCULAR; INTRAVENOUS; SUBCUTANEOUS
Qty: 0 | Refills: 0 | Status: DISCONTINUED | OUTPATIENT
Start: 2018-08-23 | End: 2018-08-28

## 2018-08-23 RX ORDER — POTASSIUM PHOSPHATE, MONOBASIC POTASSIUM PHOSPHATE, DIBASIC 236; 224 MG/ML; MG/ML
15 INJECTION, SOLUTION INTRAVENOUS ONCE
Qty: 0 | Refills: 0 | Status: COMPLETED | OUTPATIENT
Start: 2018-08-23 | End: 2018-08-23

## 2018-08-23 RX ADMIN — DEXTROSE MONOHYDRATE, SODIUM CHLORIDE, AND POTASSIUM CHLORIDE 75 MILLILITER(S): 50; .745; 4.5 INJECTION, SOLUTION INTRAVENOUS at 23:00

## 2018-08-23 RX ADMIN — POTASSIUM PHOSPHATE, MONOBASIC POTASSIUM PHOSPHATE, DIBASIC 62.5 MILLIMOLE(S): 236; 224 INJECTION, SOLUTION INTRAVENOUS at 18:41

## 2018-08-23 RX ADMIN — PIPERACILLIN AND TAZOBACTAM 25 GRAM(S): 4; .5 INJECTION, POWDER, LYOPHILIZED, FOR SOLUTION INTRAVENOUS at 22:59

## 2018-08-23 RX ADMIN — PIPERACILLIN AND TAZOBACTAM 25 GRAM(S): 4; .5 INJECTION, POWDER, LYOPHILIZED, FOR SOLUTION INTRAVENOUS at 05:53

## 2018-08-23 RX ADMIN — Medication 2: at 12:51

## 2018-08-23 RX ADMIN — PIPERACILLIN AND TAZOBACTAM 25 GRAM(S): 4; .5 INJECTION, POWDER, LYOPHILIZED, FOR SOLUTION INTRAVENOUS at 16:17

## 2018-08-23 NOTE — CHART NOTE - NSCHARTNOTEFT_GEN_A_CORE
Patient resting in bed in no acute distress. As per past discussion with family, they understand the extent of his disease and would like to ensure that he is kept comfortable but would be interested in a different hospice agency than [facility]. Case management made aware. Palliative care will sign off at this time. Please reconsult as needed.

## 2018-08-23 NOTE — PROGRESS NOTE ADULT - PROBLEM SELECTOR PLAN 1
-Sepsis Likely secondary to aspiration PNA meets severe sepsis criteria wbc<4 bacteremia+   < from: CT Abdomen and Pelvis w/ IV Cont (08.23.18 @ 13:55) >    Moderate pathologic compression fracture deformity of L3, new since   6/1/2018 and CT abdomen and pelvis. No bony retropulsion.     Redemonstration of small osseous lytic lesions throughout the axial and   appendical skeleton, as seen on prior studies, which may represent   metastatic disease versus multiple myeloma.    Bibasilar and middle lobe opacities may represent a combination of   atelectasis and/or infection. Small pleural effusions.    < end of copied text >      cont vanco/zosyn  -id f/u

## 2018-08-23 NOTE — PROGRESS NOTE ADULT - PROBLEM SELECTOR PLAN 7
hypokalemia, supplemented improved   hypomagnesemia: supplemented improved.  hypophos: supplement k-phos 15 mmole iv x1, monitor   monitor daily electrolyte, supplement as needed.

## 2018-08-23 NOTE — PROGRESS NOTE ADULT - SUBJECTIVE AND OBJECTIVE BOX
Emir Cox MD  Interventional Cardiology  [place] Office : [address] [place] [address]  Tel:   [place] Office : [address] [facility] [place]. [address]  Tel: [phone]  Cell : [phone]    Subjective : Pt lying in bed, not in distress, denies any chest pain or SOB  	  MEDICATIONS:    piperacillin/tazobactam IVPB. 3.375 Gram(s) IV Intermittent every 8 hours      acetaminophen  Suppository 650 milliGRAM(s) Rectal every 6 hours PRN  acetaminophen  Suppository. 650 milliGRAM(s) Rectal every 6 hours PRN  HYDROmorphone  Injectable 0.2 milliGRAM(s) IV Push every 3 hours PRN  ondansetron Injectable 4 milliGRAM(s) IV Push every 6 hours PRN      dextrose 40% Gel 15 Gram(s) Oral once PRN  dextrose 50% Injectable 12.5 Gram(s) IV Push once  dextrose 50% Injectable 25 Gram(s) IV Push once  dextrose 50% Injectable 25 Gram(s) IV Push once  glucagon  Injectable 1 milliGRAM(s) IntraMuscular once PRN  insulin lispro (HumaLOG) corrective regimen sliding scale   SubCutaneous every 6 hours    dextrose 5% + sodium chloride 0.9% with potassium chloride 20 mEq/L 1000 milliLiter(s) IV Continuous <Continuous>  dextrose 5%. 1000 milliLiter(s) IV Continuous <Continuous>      PHYSICAL EXAM:  T(C): 36.3 (08-23-18 @ 21:27), Max: 37 (08-23-18 @ 05:59)  HR: 89 (08-23-18 @ 21:27) (71 - 108)  BP: 117/74 (08-23-18 @ 21:27) (106/60 - 140/62)  RR: 18 (08-23-18 @ 21:27) (18 - 18)  SpO2: 100% (08-23-18 @ 21:27) (96% - 100%)  Wt(kg): --  I&O's Summary        Appearance: Normal	  HEENT:   Normal oral mucosa, PERRL, EOMI	  Cardiovascular: Normal S1 S2, No JVD, No murmurs, No edema  Respiratory: Lungs clear to auscultation	  Gastrointestinal:  Soft, Non-tender, + BS	  Extremities: Normal range of motion, No clubbing, cyanosis or edema                                    13.5   10.74 )-----------( 122      ( 23 Aug 2018 06:56 )             42.4     08-23    143  |  108<H>  |  16  ----------------------------<  172<H>  3.7   |  25  |  1.19    Ca    9.9      23 Aug 2018 06:56  Phos  2.1     08-23  Mg     1.9     08-23    TPro  6.6  /  Alb  1.9<L>  /  TBili  1.5<H>  /  DBili  x   /  AST  55<H>  /  ALT  27  /  AlkPhos  99  08-23    proBNP:   Lipid Profile:   HgA1c:   TSH:

## 2018-08-23 NOTE — PROGRESS NOTE ADULT - SUBJECTIVE AND OBJECTIVE BOX
[facility]   MD RUDDY BACH MD ANGELA WONG, PA    TEL:  OFFICE: [phone]  DR FRANCES CELL: [phone]  DR. VILLALTA CELL: [phone]  SHAYNA BORREGO CELL: [phone]        Patient is a 88y old  Male who presents with a chief complaint of SOB (20 Aug 2018 18:19)      Patient seen and examined at bedside. awake and able to follow simple commends.     VITALS:  T(F): 98.6 (08-23-18 @ 05:59), Max: 98.8 (08-22-18 @ 22:07)  HR: 73 (08-23-18 @ 05:59)  BP: 106/60 (08-23-18 @ 05:59)  RR: 18 (08-23-18 @ 05:59)  SpO2: 100% (08-23-18 @ 05:59)  Wt(kg): --        PHYSICAL EXAM:  Constitutional: awake and able to follow simple commends.   Neck: No JVD  Respiratory: CTAB, no wheezes, rales or rhonchi  Cardiovascular: S1, S2, RRR  Gastrointestinal: BS+, soft, NT/ND  Extremities: No peripheral edema    Hospital Medications:   MEDICATIONS  (STANDING):  dextrose 5% + sodium chloride 0.9% with potassium chloride 20 mEq/L 1000 milliLiter(s) (75 mL/Hr) IV Continuous <Continuous>  dextrose 5%. 1000 milliLiter(s) (50 mL/Hr) IV Continuous <Continuous>  dextrose 50% Injectable 12.5 Gram(s) IV Push once  dextrose 50% Injectable 25 Gram(s) IV Push once  dextrose 50% Injectable 25 Gram(s) IV Push once  insulin lispro (HumaLOG) corrective regimen sliding scale   SubCutaneous every 6 hours  piperacillin/tazobactam IVPB. 3.375 Gram(s) IV Intermittent every 8 hours      LABS:  08-23    143  |  108<H>  |  16  ----------------------------<  172<H>  3.7   |  25  |  1.19    Ca    9.9      23 Aug 2018 06:56  Phos  2.1     08-23  Mg     1.9     08-23    TPro  6.6  /  Alb  1.9<L>  /  TBili  1.5<H>  /  DBili      /  AST  55<H>  /  ALT  27  /  AlkPhos  99  08-23    Creatinine Trend: 1.19 <--, 1.46 <--, 1.61 <--, 1.80 <--, 2.08 <--    Phosphorus Level, Serum: 2.1 mg/dL (08-23 @ 06:56)  Albumin, Serum: 1.9 g/dL (08-23 @ 06:56)                              13.5   10.74 )-----------( 122      ( 23 Aug 2018 06:56 )             42.4     Urine Studies:  Urinalysis - [08-20-18 @ 15:10]      Color YELLOW / Appearance HAZY / SG 1.021 / pH 5.5      Gluc TRACE / Ketone NEGATIVE  / Bili SMALL / Urobili 1       Blood NEGATIVE / Protein 30 / Leuk Est NEGATIVE / Nitrite NEGATIVE      RBC 3-5 / WBC 2-5 / Hyaline  / Gran  / Sq Epi OCC / Non Sq Epi  / Bacteria FEW      PTH 10.35 (Ca --)      [08-22-18 @ 06:04]   --  PTH 5.92 (Ca --)      [06-01-18 @ 06:10]   --  Vitamin D (25OH) 42.8      [06-01-18 @ 06:10]  HbA1c 7.3      [08-21-18 @ 06:00]  TSH 1.18      [06-04-18 @ 05:20]        RADIOLOGY & ADDITIONAL STUDIES: [facility]   MD RUDDY BACH MD ANGELA WONG, PA    TEL:  OFFICE: [phone]  DR FRANCES CELL: [phone]  DR. VILLALTA CELL: [phone]  SHAYNA BORREGO CELL: [phone]        Patient is a 88y old  Male who presents with a chief complaint of SOB       Patient seen and examined at bedside. awake and able to follow simple commends.     VITALS:  T(F): 98.6 (08-23-18 @ 05:59), Max: 98.8 (08-22-18 @ 22:07)  HR: 73 (08-23-18 @ 05:59)  BP: 106/60 (08-23-18 @ 05:59)  RR: 18 (08-23-18 @ 05:59)  SpO2: 100% (08-23-18 @ 05:59)  Wt(kg): --        PHYSICAL EXAM:  Constitutional: awake and able to follow simple commands  Neck: No JVD  Respiratory: CTAB,   Cardiovascular: S1, S2, RRR  Gastrointestinal: BS+, soft, NT/ND  Extremities: No peripheral edema    Hospital Medications:   MEDICATIONS  (STANDING):  dextrose 5% + sodium chloride 0.9% with potassium chloride 20 mEq/L 1000 milliLiter(s) (75 mL/Hr) IV Continuous <Continuous>  dextrose 5%. 1000 milliLiter(s) (50 mL/Hr) IV Continuous <Continuous>  dextrose 50% Injectable 12.5 Gram(s) IV Push once  dextrose 50% Injectable 25 Gram(s) IV Push once  dextrose 50% Injectable 25 Gram(s) IV Push once  insulin lispro (HumaLOG) corrective regimen sliding scale   SubCutaneous every 6 hours  piperacillin/tazobactam IVPB. 3.375 Gram(s) IV Intermittent every 8 hours      LABS:  08-23    143  |  108<H>  |  16  ----------------------------<  172<H>  3.7   |  25  |  1.19    Ca    9.9      23 Aug 2018 06:56  Phos  2.1     08-23  Mg     1.9     08-23    TPro  6.6  /  Alb  1.9<L>  /  TBili  1.5<H>  /  DBili      /  AST  55<H>  /  ALT  27  /  AlkPhos  99  08-23    Creatinine Trend: 1.19 <--, 1.46 <--, 1.61 <--, 1.80 <--, 2.08 <--    Phosphorus Level, Serum: 2.1 mg/dL (08-23 @ 06:56)  Albumin, Serum: 1.9 g/dL (08-23 @ 06:56)                              13.5   10.74 )-----------( 122      ( 23 Aug 2018 06:56 )             42.4     Urine Studies:  Urinalysis - [08-20-18 @ 15:10]      Color YELLOW / Appearance HAZY / SG 1.021 / pH 5.5      Gluc TRACE / Ketone NEGATIVE  / Bili SMALL / Urobili 1       Blood NEGATIVE / Protein 30 / Leuk Est NEGATIVE / Nitrite NEGATIVE      RBC 3-5 / WBC 2-5 / Hyaline  / Gran  / Sq Epi OCC / Non Sq Epi  / Bacteria FEW      PTH 10.35 (Ca --)      [08-22-18 @ 06:04]   --  PTH 5.92 (Ca --)      [06-01-18 @ 06:10]   --  Vitamin D (25OH) 42.8      [06-01-18 @ 06:10]  HbA1c 7.3      [08-21-18 @ 06:00]  TSH 1.18      [06-04-18 @ 05:20]        RADIOLOGY & ADDITIONAL STUDIES:

## 2018-08-23 NOTE — PROGRESS NOTE ADULT - PROBLEM SELECTOR PLAN 1
ERIKA likely hemodynamically mediated in setting of hypotension and hypercalcemia.   Renal function improving, ok from renal stand point for IV contrast study if needed. continue current IVF. monitor fluid status  renal US neg for obstruction   patient still NPO on NS +D5 +kcl 20meq @ 75cc/hr for maintenance until able to tolerate PO   monitor fluid status  monitor BMP. strict I/O   avoid nephrotoxics, NSAIDS RCA

## 2018-08-23 NOTE — PROGRESS NOTE ADULT - SUBJECTIVE AND OBJECTIVE BOX
chief complaint : shortness of breath        SUBJECTIVE / OVERNIGHT EVENTS: unable to obtain ros from pt , pt lethargic intermittently opening eyes asking for water ,  pts family next to pts bed    MEDICATIONS  (STANDING):  dextrose 5% + sodium chloride 0.9% with potassium chloride 20 mEq/L 1000 milliLiter(s) (75 mL/Hr) IV Continuous <Continuous>  dextrose 5%. 1000 milliLiter(s) (50 mL/Hr) IV Continuous <Continuous>  dextrose 50% Injectable 12.5 Gram(s) IV Push once  dextrose 50% Injectable 25 Gram(s) IV Push once  dextrose 50% Injectable 25 Gram(s) IV Push once  insulin lispro (HumaLOG) corrective regimen sliding scale   SubCutaneous every 6 hours  piperacillin/tazobactam IVPB. 3.375 Gram(s) IV Intermittent every 8 hours    MEDICATIONS  (PRN):  acetaminophen  Suppository 650 milliGRAM(s) Rectal every 6 hours PRN For Temp greater than 38 C (100.4 F)  acetaminophen  Suppository. 650 milliGRAM(s) Rectal every 6 hours PRN Mild Pain (1 - 3)  dextrose 40% Gel 15 Gram(s) Oral once PRN Blood Glucose LESS THAN 70 milliGRAM(s)/deciliter  glucagon  Injectable 1 milliGRAM(s) IntraMuscular once PRN Glucose LESS THAN 70 milligrams/deciliter  HYDROmorphone  Injectable 0.2 milliGRAM(s) IV Push every 3 hours PRN tachypnic / sob  ondansetron Injectable 4 milliGRAM(s) IV Push every 6 hours PRN Nausea    Vital Signs Last 24 Hrs  T(C): 36.4 (23 Aug 2018 13:55), Max: 37.1 (22 Aug 2018 22:07)  T(F): 97.5 (23 Aug 2018 13:55), Max: 98.8 (22 Aug 2018 22:07)  HR: 71 (23 Aug 2018 15:55) (71 - 108)  BP: 140/62 (23 Aug 2018 13:55) (106/60 - 140/62)  BP(mean): --  RR: 18 (23 Aug 2018 13:55) (18 - 18)  SpO2: 97% (23 Aug 2018 15:55) (97% - 100%)    unable to obtain ros from pt - pt lethargic      PHYSICAL EXAM:  CHEST/LUNG: bronchial breath sounds + ant  HEART:  S1 , S2 +  ABDOMEN: soft, bs+  EXTREMITIES: trace edema  NEUROLOGY: lethargic      LABS:      143  |  108<H>  |  16  ----------------------------<  172<H>  3.7   |  25  |  1.19    Ca    9.9      23 Aug 2018 06:56  Phos  2.1       Mg     1.9         TPro  6.6  /  Alb  1.9<L>  /  TBili  1.5<H>  /  DBili      /  AST  55<H>  /  ALT  27  /  AlkPhos  99      Creatinine Trend: 1.19 <--, 1.46 <--, 1.61 <--, 1.80 <--, 2.08 <--                        13.5   10.74 )-----------( 122      ( 23 Aug 2018 06:56 )             42.4     Urine Studies:  Urinalysis Basic - ( 20 Aug 2018 15:10 )    Color: YELLOW / Appearance: HAZY / S.021 / pH: 5.5  Gluc: TRACE / Ketone: NEGATIVE  / Bili: SMALL / Urobili: 1   Blood: NEGATIVE / Protein: 30 / Nitrite: NEGATIVE   Leuk Esterase: NEGATIVE / RBC: 3-5 / WBC 2-5   Sq Epi: OCC / Non Sq Epi:  / Bacteria: FEW              LIVER FUNCTIONS - ( 23 Aug 2018 06:56 )  Alb: 1.9 g/dL / Pro: 6.6 g/dL / ALK PHOS: 99 u/L / ALT: 27 u/L / AST: 55 u/L / GGT: x

## 2018-08-24 DIAGNOSIS — E87.0 HYPEROSMOLALITY AND HYPERNATREMIA: ICD-10-CM

## 2018-08-24 LAB
-  AMIKACIN: SIGNIFICANT CHANGE UP
-  AMPICILLIN/SULBACTAM: SIGNIFICANT CHANGE UP
-  AMPICILLIN: SIGNIFICANT CHANGE UP
-  AZTREONAM: SIGNIFICANT CHANGE UP
-  CEFAZOLIN: SIGNIFICANT CHANGE UP
-  CEFEPIME: SIGNIFICANT CHANGE UP
-  CEFOXITIN: SIGNIFICANT CHANGE UP
-  CEFTAZIDIME: SIGNIFICANT CHANGE UP
-  CEFTRIAXONE: SIGNIFICANT CHANGE UP
-  CIPROFLOXACIN: SIGNIFICANT CHANGE UP
-  ERTAPENEM: SIGNIFICANT CHANGE UP
-  GENTAMICIN: SIGNIFICANT CHANGE UP
-  IMIPENEM: SIGNIFICANT CHANGE UP
-  LEVOFLOXACIN: SIGNIFICANT CHANGE UP
-  MEROPENEM: SIGNIFICANT CHANGE UP
-  PIPERACILLIN/TAZOBACTAM: SIGNIFICANT CHANGE UP
-  TIGECYCLINE: SIGNIFICANT CHANGE UP
-  TOBRAMYCIN: SIGNIFICANT CHANGE UP
-  TRIMETHOPRIM/SULFAMETHOXAZOLE: SIGNIFICANT CHANGE UP
ALBUMIN SERPL ELPH-MCNC: 2 G/DL — LOW (ref 3.3–5)
ALP SERPL-CCNC: 81 U/L — SIGNIFICANT CHANGE UP (ref 40–120)
ALT FLD-CCNC: 23 U/L — SIGNIFICANT CHANGE UP (ref 4–41)
AST SERPL-CCNC: 40 U/L — SIGNIFICANT CHANGE UP (ref 4–40)
BASOPHILS # BLD AUTO: 0.04 K/UL — SIGNIFICANT CHANGE UP (ref 0–0.2)
BASOPHILS NFR BLD AUTO: 0.6 % — SIGNIFICANT CHANGE UP (ref 0–2)
BILIRUB SERPL-MCNC: 1 MG/DL — SIGNIFICANT CHANGE UP (ref 0.2–1.2)
BUN SERPL-MCNC: 13 MG/DL — SIGNIFICANT CHANGE UP (ref 7–23)
CALCIUM SERPL-MCNC: 9.4 MG/DL — SIGNIFICANT CHANGE UP (ref 8.4–10.5)
CHLORIDE SERPL-SCNC: 116 MMOL/L — HIGH (ref 98–107)
CO2 SERPL-SCNC: 24 MMOL/L — SIGNIFICANT CHANGE UP (ref 22–31)
CREAT SERPL-MCNC: 1.22 MG/DL — SIGNIFICANT CHANGE UP (ref 0.5–1.3)
EOSINOPHIL # BLD AUTO: 0.22 K/UL — SIGNIFICANT CHANGE UP (ref 0–0.5)
EOSINOPHIL NFR BLD AUTO: 3.2 % — SIGNIFICANT CHANGE UP (ref 0–6)
GLUCOSE BLDC GLUCOMTR-MCNC: 133 MG/DL — HIGH (ref 70–99)
GLUCOSE BLDC GLUCOMTR-MCNC: 152 MG/DL — HIGH (ref 70–99)
GLUCOSE SERPL-MCNC: 128 MG/DL — HIGH (ref 70–99)
HCT VFR BLD CALC: 40.2 % — SIGNIFICANT CHANGE UP (ref 39–50)
HGB BLD-MCNC: 12.8 G/DL — LOW (ref 13–17)
IMM GRANULOCYTES # BLD AUTO: 0.11 # — SIGNIFICANT CHANGE UP
IMM GRANULOCYTES NFR BLD AUTO: 1.6 % — HIGH (ref 0–1.5)
LYMPHOCYTES # BLD AUTO: 2.9 K/UL — SIGNIFICANT CHANGE UP (ref 1–3.3)
LYMPHOCYTES # BLD AUTO: 42.2 % — SIGNIFICANT CHANGE UP (ref 13–44)
MAGNESIUM SERPL-MCNC: 1.7 MG/DL — SIGNIFICANT CHANGE UP (ref 1.6–2.6)
MCHC RBC-ENTMCNC: 28.5 PG — SIGNIFICANT CHANGE UP (ref 27–34)
MCHC RBC-ENTMCNC: 31.8 % — LOW (ref 32–36)
MCV RBC AUTO: 89.5 FL — SIGNIFICANT CHANGE UP (ref 80–100)
METHOD TYPE: SIGNIFICANT CHANGE UP
MONOCYTES # BLD AUTO: 0.56 K/UL — SIGNIFICANT CHANGE UP (ref 0–0.9)
MONOCYTES NFR BLD AUTO: 8.1 % — SIGNIFICANT CHANGE UP (ref 2–14)
NEUTROPHILS # BLD AUTO: 3.05 K/UL — SIGNIFICANT CHANGE UP (ref 1.8–7.4)
NEUTROPHILS NFR BLD AUTO: 44.3 % — SIGNIFICANT CHANGE UP (ref 43–77)
NRBC # FLD: 0 — SIGNIFICANT CHANGE UP
ORGANISM # SPEC MICROSCOPIC CNT: SIGNIFICANT CHANGE UP
ORGANISM # SPEC MICROSCOPIC CNT: SIGNIFICANT CHANGE UP
PHOSPHATE SERPL-MCNC: 2.5 MG/DL — SIGNIFICANT CHANGE UP (ref 2.5–4.5)
PLATELET # BLD AUTO: 131 K/UL — LOW (ref 150–400)
PMV BLD: 9.7 FL — SIGNIFICANT CHANGE UP (ref 7–13)
POTASSIUM SERPL-MCNC: 3.4 MMOL/L — LOW (ref 3.5–5.3)
POTASSIUM SERPL-SCNC: 3.4 MMOL/L — LOW (ref 3.5–5.3)
PROT SERPL-MCNC: 6.2 G/DL — SIGNIFICANT CHANGE UP (ref 6–8.3)
RBC # BLD: 4.49 M/UL — SIGNIFICANT CHANGE UP (ref 4.2–5.8)
RBC # FLD: 15.1 % — HIGH (ref 10.3–14.5)
SODIUM SERPL-SCNC: 150 MMOL/L — HIGH (ref 135–145)
WBC # BLD: 6.88 K/UL — SIGNIFICANT CHANGE UP (ref 3.8–10.5)
WBC # FLD AUTO: 6.88 K/UL — SIGNIFICANT CHANGE UP (ref 3.8–10.5)

## 2018-08-24 PROCEDURE — 99232 SBSQ HOSP IP/OBS MODERATE 35: CPT

## 2018-08-24 RX ORDER — INSULIN LISPRO 100/ML
VIAL (ML) SUBCUTANEOUS AT BEDTIME
Qty: 0 | Refills: 0 | Status: DISCONTINUED | OUTPATIENT
Start: 2018-08-24 | End: 2018-08-28

## 2018-08-24 RX ORDER — DEXTROSE MONOHYDRATE, SODIUM CHLORIDE, AND POTASSIUM CHLORIDE 50; .745; 4.5 G/1000ML; G/1000ML; G/1000ML
1000 INJECTION, SOLUTION INTRAVENOUS
Qty: 0 | Refills: 0 | Status: DISCONTINUED | OUTPATIENT
Start: 2018-08-24 | End: 2018-08-25

## 2018-08-24 RX ORDER — INSULIN LISPRO 100/ML
VIAL (ML) SUBCUTANEOUS
Qty: 0 | Refills: 0 | Status: DISCONTINUED | OUTPATIENT
Start: 2018-08-24 | End: 2018-08-28

## 2018-08-24 RX ORDER — POTASSIUM CHLORIDE 20 MEQ
40 PACKET (EA) ORAL ONCE
Qty: 0 | Refills: 0 | Status: DISCONTINUED | OUTPATIENT
Start: 2018-08-24 | End: 2018-08-24

## 2018-08-24 RX ORDER — POTASSIUM CHLORIDE 20 MEQ
10 PACKET (EA) ORAL
Qty: 0 | Refills: 0 | Status: COMPLETED | OUTPATIENT
Start: 2018-08-24 | End: 2018-08-24

## 2018-08-24 RX ADMIN — DEXTROSE MONOHYDRATE, SODIUM CHLORIDE, AND POTASSIUM CHLORIDE 60 MILLILITER(S): 50; .745; 4.5 INJECTION, SOLUTION INTRAVENOUS at 23:29

## 2018-08-24 RX ADMIN — Medication 100 MILLIEQUIVALENT(S): at 14:12

## 2018-08-24 RX ADMIN — PIPERACILLIN AND TAZOBACTAM 25 GRAM(S): 4; .5 INJECTION, POWDER, LYOPHILIZED, FOR SOLUTION INTRAVENOUS at 06:03

## 2018-08-24 RX ADMIN — PIPERACILLIN AND TAZOBACTAM 25 GRAM(S): 4; .5 INJECTION, POWDER, LYOPHILIZED, FOR SOLUTION INTRAVENOUS at 23:29

## 2018-08-24 RX ADMIN — Medication 100 MILLIEQUIVALENT(S): at 16:26

## 2018-08-24 RX ADMIN — DEXTROSE MONOHYDRATE, SODIUM CHLORIDE, AND POTASSIUM CHLORIDE 75 MILLILITER(S): 50; .745; 4.5 INJECTION, SOLUTION INTRAVENOUS at 12:29

## 2018-08-24 RX ADMIN — Medication 100 MILLIEQUIVALENT(S): at 17:47

## 2018-08-24 RX ADMIN — Medication 1: at 00:10

## 2018-08-24 RX ADMIN — Medication 1: at 12:26

## 2018-08-24 RX ADMIN — DEXTROSE MONOHYDRATE, SODIUM CHLORIDE, AND POTASSIUM CHLORIDE 60 MILLILITER(S): 50; .745; 4.5 INJECTION, SOLUTION INTRAVENOUS at 14:14

## 2018-08-24 RX ADMIN — PIPERACILLIN AND TAZOBACTAM 25 GRAM(S): 4; .5 INJECTION, POWDER, LYOPHILIZED, FOR SOLUTION INTRAVENOUS at 16:30

## 2018-08-24 RX ADMIN — Medication 1: at 06:03

## 2018-08-24 NOTE — PROGRESS NOTE ADULT - PROBLEM SELECTOR PLAN 4
was hyponatremia sec to hyperglycemia, now hypernatremic. IVF has been changed.   continue to monitor in setting of RF and Lactic acidosis  now improved, d/c bicarb gtt  monitor serum co2.

## 2018-08-24 NOTE — PROGRESS NOTE ADULT - PROBLEM SELECTOR PLAN 1
ERIKA likely hemodynamically mediated in setting of hypotension and hypercalcemia.   Renal function improved to baseline. s/p CT with contrast 8/23. BMP stable   elevated Na with small b/l pl. effusion seen on CT, will change IVF to D5 with 1/2NS @ 60cc/hr as patient still NPO  continue to monitor BMP. strict I/O   avoid nephrotoxics, NSAIDS RCA ERIKA likely hemodynamically mediated in setting of hypotension and hypercalcemia.   Renal function improved to baseline.   Pt s/p CT with contrast 8/23. Monitor BMP closely  continue to monitor BMP. strict I/O   avoid nephrotoxics, NSAIDS RCA

## 2018-08-24 NOTE — PROGRESS NOTE ADULT - SUBJECTIVE AND OBJECTIVE BOX
chief complaint : shortness of breath        SUBJECTIVE / OVERNIGHT EVENTS: unable to obtain ros from pt , pt lethargic intermittently opening eyes asking for water ,  pts family next to pts bed    MEDICATIONS  (STANDING):  dextrose 5% + sodium chloride 0.45% with potassium chloride 20 mEq/L 1000 milliLiter(s) (60 mL/Hr) IV Continuous <Continuous>  dextrose 5%. 1000 milliLiter(s) (50 mL/Hr) IV Continuous <Continuous>  dextrose 50% Injectable 12.5 Gram(s) IV Push once  dextrose 50% Injectable 25 Gram(s) IV Push once  dextrose 50% Injectable 25 Gram(s) IV Push once  insulin lispro (HumaLOG) corrective regimen sliding scale   SubCutaneous three times a day before meals  insulin lispro (HumaLOG) corrective regimen sliding scale   SubCutaneous at bedtime  piperacillin/tazobactam IVPB. 3.375 Gram(s) IV Intermittent every 8 hours    MEDICATIONS  (PRN):  acetaminophen  Suppository 650 milliGRAM(s) Rectal every 6 hours PRN For Temp greater than 38 C (100.4 F)  acetaminophen  Suppository. 650 milliGRAM(s) Rectal every 6 hours PRN Mild Pain (1 - 3)  dextrose 40% Gel 15 Gram(s) Oral once PRN Blood Glucose LESS THAN 70 milliGRAM(s)/deciliter  glucagon  Injectable 1 milliGRAM(s) IntraMuscular once PRN Glucose LESS THAN 70 milligrams/deciliter  HYDROmorphone  Injectable 0.2 milliGRAM(s) IV Push every 3 hours PRN tachypnic / sob  ondansetron Injectable 4 milliGRAM(s) IV Push every 6 hours PRN Nausea    Vital Signs Last 24 Hrs  T(C): 36.9 (24 Aug 2018 12:26), Max: 36.9 (24 Aug 2018 12:26)  T(F): 98.4 (24 Aug 2018 12:26), Max: 98.4 (24 Aug 2018 12:26)  HR: 79 (24 Aug 2018 19:37) (65 - 89)  BP: 140/88 (24 Aug 2018 12:26) (117/74 - 140/88)  BP(mean): --  RR: 16 (24 Aug 2018 12:26) (16 - 18)  SpO2: 96% (24 Aug 2018 19:37) (95% - 100%)    unable to obtain ros from pt - pt lethargic      PHYSICAL EXAM:  CHEST/LUNG: bronchial breath sounds + ant  HEART:  S1 , S2 +  ABDOMEN: soft, bs+  EXTREMITIES: trace edema  NEUROLOGY: lethargic    LABS:  08    150<H>  |  116<H>  |  13  ----------------------------<  128<H>  3.4<L>   |  24  |  1.22    Ca    9.4      24 Aug 2018 07:06  Phos  2.5     08-  Mg     1.7     -24    TPro  6.2  /  Alb  2.0<L>  /  TBili  1.0  /  DBili      /  AST  40  /  ALT  23  /  AlkPhos  81      Creatinine Trend: 1.22 <--, 1.19 <--, 1.46 <--, 1.61 <--, 1.80 <--, 2.08 <--                        12.8   6.88  )-----------( 131      ( 24 Aug 2018 07:06 )             40.2     Urine Studies:  Urinalysis Basic - ( 20 Aug 2018 15:10 )    Color: YELLOW / Appearance: HAZY / S.021 / pH: 5.5  Gluc: TRACE / Ketone: NEGATIVE  / Bili: SMALL / Urobili: 1   Blood: NEGATIVE / Protein: 30 / Nitrite: NEGATIVE   Leuk Esterase: NEGATIVE / RBC: 3-5 / WBC 2-5   Sq Epi: OCC / Non Sq Epi:  / Bacteria: FEW              LIVER FUNCTIONS - ( 24 Aug 2018 07:06 )  Alb: 2.0 g/dL / Pro: 6.2 g/dL / ALK PHOS: 81 u/L / ALT: 23 u/L / AST: 40 u/L / GGT: x

## 2018-08-24 NOTE — PROGRESS NOTE ADULT - PROBLEM SELECTOR PLAN 7
hypokalemia, klor 40meq x1, monitor  hypomagnesemia: supplemented improved.  hypophos: supplemented monitor   monitor daily electrolyte, supplement as needed. Hypernatremia   Advise change IVF to D5 with 1/2NS @ 60cc/hr as patient still NPO

## 2018-08-24 NOTE — PROGRESS NOTE ADULT - SUBJECTIVE AND OBJECTIVE BOX
ASPEN AVILEZ 88y [id number]    Patient is a 88y old  Male who presents with a chief complaint of SOB (20 Aug 2018 18:19)      Follow Up/CC:  ID following for bacteremia    Interval History/ROS: no acute issues, doing better    Allergies    No Known Allergies    Intolerances        ANTIMICROBIALS:  piperacillin/tazobactam IVPB. 3.375 every 8 hours      MEDICATIONS  (STANDING):  dextrose 5% + sodium chloride 0.45% with potassium chloride 20 mEq/L 1000 milliLiter(s) (60 mL/Hr) IV Continuous <Continuous>  dextrose 5%. 1000 milliLiter(s) (50 mL/Hr) IV Continuous <Continuous>  dextrose 50% Injectable 12.5 Gram(s) IV Push once  dextrose 50% Injectable 25 Gram(s) IV Push once  dextrose 50% Injectable 25 Gram(s) IV Push once  insulin lispro (HumaLOG) corrective regimen sliding scale   SubCutaneous every 6 hours  piperacillin/tazobactam IVPB. 3.375 Gram(s) IV Intermittent every 8 hours  potassium chloride  10 mEq/100 mL IVPB 10 milliEquivalent(s) IV Intermittent every 1 hour    MEDICATIONS  (PRN):  acetaminophen  Suppository 650 milliGRAM(s) Rectal every 6 hours PRN For Temp greater than 38 C (100.4 F)  acetaminophen  Suppository. 650 milliGRAM(s) Rectal every 6 hours PRN Mild Pain (1 - 3)  dextrose 40% Gel 15 Gram(s) Oral once PRN Blood Glucose LESS THAN 70 milliGRAM(s)/deciliter  glucagon  Injectable 1 milliGRAM(s) IntraMuscular once PRN Glucose LESS THAN 70 milligrams/deciliter  HYDROmorphone  Injectable 0.2 milliGRAM(s) IV Push every 3 hours PRN tachypnic / sob  ondansetron Injectable 4 milliGRAM(s) IV Push every 6 hours PRN Nausea        Vital Signs Last 24 Hrs  T(C): 36.9 (24 Aug 2018 12:26), Max: 36.9 (24 Aug 2018 12:26)  T(F): 98.4 (24 Aug 2018 12:26), Max: 98.4 (24 Aug 2018 12:26)  HR: 70 (24 Aug 2018 15:29) (65 - 89)  BP: 140/88 (24 Aug 2018 12:26) (117/74 - 140/88)  BP(mean): --  RR: 16 (24 Aug 2018 12:26) (16 - 18)  SpO2: 95% (24 Aug 2018 15:29) (95% - 100%)    CBC Full  -  ( 24 Aug 2018 07:06 )  WBC Count : 6.88 K/uL  Hemoglobin : 12.8 g/dL  Hematocrit : 40.2 %  Platelet Count - Automated : 131 K/uL  Mean Cell Volume : 89.5 fL  Mean Cell Hemoglobin : 28.5 pg  Mean Cell Hemoglobin Concentration : 31.8 %  Auto Neutrophil # : 3.05 K/uL  Auto Lymphocyte # : 2.90 K/uL  Auto Monocyte # : 0.56 K/uL  Auto Eosinophil # : 0.22 K/uL  Auto Basophil # : 0.04 K/uL  Auto Neutrophil % : 44.3 %  Auto Lymphocyte % : 42.2 %  Auto Monocyte % : 8.1 %  Auto Eosinophil % : 3.2 %  Auto Basophil % : 0.6 %    08-24    150<H>  |  116<H>  |  13  ----------------------------<  128<H>  3.4<L>   |  24  |  1.22    Ca    9.4      24 Aug 2018 07:06  Phos  2.5     08-24  Mg     1.7     08-24    TPro  6.2  /  Alb  2.0<L>  /  TBili  1.0  /  DBili  x   /  AST  40  /  ALT  23  /  AlkPhos  81  08-24    LIVER FUNCTIONS - ( 24 Aug 2018 07:06 )  Alb: 2.0 g/dL / Pro: 6.2 g/dL / ALK PHOS: 81 u/L / ALT: 23 u/L / AST: 40 u/L / GGT: x               MICROBIOLOGY:  BLOOD PERIPHERAL  08-22-18 --  --  Klebsiella pneumoniae      URINE MIDSTREAM  08-20-18 --  --  --      BLOOD PERIPHERAL  08-20-18 --  --  BLOOD CULTURE PCR  Escherichia coli              v            RADIOLOGY

## 2018-08-24 NOTE — PROGRESS NOTE ADULT - PROBLEM SELECTOR PLAN 6
hypotensive , improving  monitor closely. hypokalemia, kcl 40meq x1, monitor  hypomagnesemia: supplemented improved.  hypophos: supplemented monitor   monitor daily electrolyte, supplement as needed.

## 2018-08-24 NOTE — PROGRESS NOTE ADULT - PROBLEM SELECTOR PLAN 5
in setting of RF and Lactic acidosis  now improved, d/c bicarb gtt  monitor serum co2. hypotensive , improving  monitor closely.

## 2018-08-24 NOTE — PROGRESS NOTE ADULT - SUBJECTIVE AND OBJECTIVE BOX
Emir Cox MD  Interventional Cardiology / Advance Heart Failure and Cardiac Transplant Specialist  [place] Office : [address] [place] [id number]  Tel:   [place] Office : [address] [facility] [place] [address]  Tel: [phone]  Cell : [phone]    Subjective : Pt lying in bed comfortable, not in distress, denies any chest pain or SOB  	  MEDICATIONS:    piperacillin/tazobactam IVPB. 3.375 Gram(s) IV Intermittent every 8 hours      acetaminophen  Suppository 650 milliGRAM(s) Rectal every 6 hours PRN  acetaminophen  Suppository. 650 milliGRAM(s) Rectal every 6 hours PRN  HYDROmorphone  Injectable 0.2 milliGRAM(s) IV Push every 3 hours PRN  ondansetron Injectable 4 milliGRAM(s) IV Push every 6 hours PRN      dextrose 40% Gel 15 Gram(s) Oral once PRN  dextrose 50% Injectable 12.5 Gram(s) IV Push once  dextrose 50% Injectable 25 Gram(s) IV Push once  dextrose 50% Injectable 25 Gram(s) IV Push once  glucagon  Injectable 1 milliGRAM(s) IntraMuscular once PRN  insulin lispro (HumaLOG) corrective regimen sliding scale   SubCutaneous every 6 hours    dextrose 5% + sodium chloride 0.9% with potassium chloride 20 mEq/L 1000 milliLiter(s) IV Continuous <Continuous>  dextrose 5%. 1000 milliLiter(s) IV Continuous <Continuous>      PHYSICAL EXAM:  T(C): 36.9 (08-24-18 @ 12:26), Max: 36.9 (08-24-18 @ 12:26)  HR: 84 (08-24-18 @ 12:26) (65 - 89)  BP: 140/88 (08-24-18 @ 12:26) (117/74 - 140/88)  RR: 16 (08-24-18 @ 12:26) (16 - 18)  SpO2: 96% (08-24-18 @ 12:26) (96% - 100%)  Wt(kg): --  I&O's Summary        Appearance: Normal	  HEENT:   Normal oral mucosa, PERRL, EOMI	  Cardiovascular: Normal S1 S2, No JVD, No murmurs, No edema  Respiratory: Lungs clear to auscultation	  Gastrointestinal:  Soft, Non-tender, + BS	  Extremities: Normal range of motion, No clubbing, cyanosis or edema                                    12.8   6.88  )-----------( 131      ( 24 Aug 2018 07:06 )             40.2     08-24    150<H>  |  116<H>  |  13  ----------------------------<  128<H>  3.4<L>   |  24  |  1.22    Ca    9.4      24 Aug 2018 07:06  Phos  2.5     08-24  Mg     1.7     08-24    TPro  6.2  /  Alb  2.0<L>  /  TBili  1.0  /  DBili  x   /  AST  40  /  ALT  23  /  AlkPhos  81  08-24    proBNP:   Lipid Profile:   HgA1c:   TSH:

## 2018-08-24 NOTE — PROGRESS NOTE ADULT - SUBJECTIVE AND OBJECTIVE BOX
[facility] – [place], [place] NEPHROLOGY PRACTICE   MD RUDDY BACH MD ANGELA WONG, PA    TEL:  OFFICE: [phone]  DR FRANCES CELL: [phone]  DR. VILLALTA CELL: [phone]  SHAYNA BORREGO CELL: [phone]        Patient is a 88y old  Male who presents with a chief complaint of SOB (20 Aug 2018 18:19)      Patient seen and examined at bedside. family at bedside, awake and alert, no complaints     VITALS:  T(F): 98.4 (08-24-18 @ 12:26), Max: 98.4 (08-24-18 @ 12:26)  HR: 84 (08-24-18 @ 12:26)  BP: 140/88 (08-24-18 @ 12:26)  RR: 16 (08-24-18 @ 12:26)  SpO2: 96% (08-24-18 @ 12:26)  Wt(kg): --        PHYSICAL EXAM:  Constitutional: NAD  Neck: No JVD  Respiratory: CTAB, no wheezes, rales or rhonchi  Cardiovascular: S1, S2, RRR  Gastrointestinal: BS+, soft, NT/ND  Extremities: No peripheral edema    Hospital Medications:   MEDICATIONS  (STANDING):  dextrose 5% + sodium chloride 0.45% with potassium chloride 20 mEq/L 1000 milliLiter(s) (60 mL/Hr) IV Continuous <Continuous>  dextrose 5%. 1000 milliLiter(s) (50 mL/Hr) IV Continuous <Continuous>  dextrose 50% Injectable 12.5 Gram(s) IV Push once  dextrose 50% Injectable 25 Gram(s) IV Push once  dextrose 50% Injectable 25 Gram(s) IV Push once  insulin lispro (HumaLOG) corrective regimen sliding scale   SubCutaneous every 6 hours  piperacillin/tazobactam IVPB. 3.375 Gram(s) IV Intermittent every 8 hours      LABS:  08-24    150<H>  |  116<H>  |  13  ----------------------------<  128<H>  3.4<L>   |  24  |  1.22    Ca    9.4      24 Aug 2018 07:06  Phos  2.5     08-24  Mg     1.7     08-24    TPro  6.2  /  Alb  2.0<L>  /  TBili  1.0  /  DBili      /  AST  40  /  ALT  23  /  AlkPhos  81  08-24    Creatinine Trend: 1.22 <--, 1.19 <--, 1.46 <--, 1.61 <--, 1.80 <--, 2.08 <--    Phosphorus Level, Serum: 2.5 mg/dL (08-24 @ 07:06)  Albumin, Serum: 2.0 g/dL (08-24 @ 07:06)                              12.8   6.88  )-----------( 131      ( 24 Aug 2018 07:06 )             40.2     Urine Studies:  Urinalysis - [08-20-18 @ 15:10]      Color YELLOW / Appearance HAZY / SG 1.021 / pH 5.5      Gluc TRACE / Ketone NEGATIVE  / Bili SMALL / Urobili 1       Blood NEGATIVE / Protein 30 / Leuk Est NEGATIVE / Nitrite NEGATIVE      RBC 3-5 / WBC 2-5 / Hyaline  / Gran  / Sq Epi OCC / Non Sq Epi  / Bacteria FEW      PTH 10.35 (Ca --)      [08-22-18 @ 06:04]   --  PTH 5.92 (Ca --)      [06-01-18 @ 06:10]   --  Vitamin D (25OH) 42.8      [06-01-18 @ 06:10]  HbA1c 7.3      [08-21-18 @ 06:00]  TSH 1.18      [06-04-18 @ 05:20]        RADIOLOGY & ADDITIONAL STUDIES: [facility] – [place] NEPHROLOGY PRACTICE   MD RUDDY BACH MD ANGELA WONG, PA    TEL:  OFFICE: [phone]  DR FRANCES CELL: [phone]  DR. VILLALTA CELL: [phone]  SHAYNA BORREGO CELL: [phone]        Patient is a 88y old  Male who presents with a chief complaint of SOB       Patient seen and examined at bedside. family at bedside, awake and alert, no complaints     VITALS:  T(F): 98.4 (08-24-18 @ 12:26), Max: 98.4 (08-24-18 @ 12:26)  HR: 84 (08-24-18 @ 12:26)  BP: 140/88 (08-24-18 @ 12:26)  RR: 16 (08-24-18 @ 12:26)  SpO2: 96% (08-24-18 @ 12:26)  Wt(kg): --        PHYSICAL EXAM:  Constitutional: NAD  Neck: No JVD  Respiratory: CTAB, no wheezes, rales or rhonchi  Cardiovascular: S1, S2, RRR  Gastrointestinal: BS+, soft, NT/ND  Extremities: No peripheral edema    Hospital Medications:   MEDICATIONS  (STANDING):  dextrose 5% + sodium chloride 0.45% with potassium chloride 20 mEq/L 1000 milliLiter(s) (60 mL/Hr) IV Continuous <Continuous>  dextrose 5%. 1000 milliLiter(s) (50 mL/Hr) IV Continuous <Continuous>  dextrose 50% Injectable 12.5 Gram(s) IV Push once  dextrose 50% Injectable 25 Gram(s) IV Push once  dextrose 50% Injectable 25 Gram(s) IV Push once  insulin lispro (HumaLOG) corrective regimen sliding scale   SubCutaneous every 6 hours  piperacillin/tazobactam IVPB. 3.375 Gram(s) IV Intermittent every 8 hours      LABS:  08-24    150<H>  |  116<H>  |  13  ----------------------------<  128<H>  3.4<L>   |  24  |  1.22    Ca    9.4      24 Aug 2018 07:06  Phos  2.5     08-24  Mg     1.7     08-24    TPro  6.2  /  Alb  2.0<L>  /  TBili  1.0  /  DBili      /  AST  40  /  ALT  23  /  AlkPhos  81  08-24    Creatinine Trend: 1.22 <--, 1.19 <--, 1.46 <--, 1.61 <--, 1.80 <--, 2.08 <--    Phosphorus Level, Serum: 2.5 mg/dL (08-24 @ 07:06)  Albumin, Serum: 2.0 g/dL (08-24 @ 07:06)                              12.8   6.88  )-----------( 131      ( 24 Aug 2018 07:06 )             40.2     Urine Studies:  Urinalysis - [08-20-18 @ 15:10]      Color YELLOW / Appearance HAZY / SG 1.021 / pH 5.5      Gluc TRACE / Ketone NEGATIVE  / Bili SMALL / Urobili 1       Blood NEGATIVE / Protein 30 / Leuk Est NEGATIVE / Nitrite NEGATIVE      RBC 3-5 / WBC 2-5 / Hyaline  / Gran  / Sq Epi OCC / Non Sq Epi  / Bacteria FEW      PTH 10.35 (Ca --)      [08-22-18 @ 06:04]   --  PTH 5.92 (Ca --)      [06-01-18 @ 06:10]   --  Vitamin D (25OH) 42.8      [06-01-18 @ 06:10]  HbA1c 7.3      [08-21-18 @ 06:00]  TSH 1.18      [06-04-18 @ 05:20]        RADIOLOGY & ADDITIONAL STUDIES:

## 2018-08-25 LAB
GLUCOSE BLDC GLUCOMTR-MCNC: 130 MG/DL — HIGH (ref 70–99)
GLUCOSE BLDC GLUCOMTR-MCNC: 131 MG/DL — HIGH (ref 70–99)
GLUCOSE BLDC GLUCOMTR-MCNC: 176 MG/DL — HIGH (ref 70–99)

## 2018-08-25 RX ORDER — SODIUM CHLORIDE 9 MG/ML
1000 INJECTION, SOLUTION INTRAVENOUS
Qty: 0 | Refills: 0 | Status: DISCONTINUED | OUTPATIENT
Start: 2018-08-25 | End: 2018-08-27

## 2018-08-25 RX ADMIN — PIPERACILLIN AND TAZOBACTAM 25 GRAM(S): 4; .5 INJECTION, POWDER, LYOPHILIZED, FOR SOLUTION INTRAVENOUS at 22:41

## 2018-08-25 RX ADMIN — DEXTROSE MONOHYDRATE, SODIUM CHLORIDE, AND POTASSIUM CHLORIDE 60 MILLILITER(S): 50; .745; 4.5 INJECTION, SOLUTION INTRAVENOUS at 06:11

## 2018-08-25 RX ADMIN — PIPERACILLIN AND TAZOBACTAM 25 GRAM(S): 4; .5 INJECTION, POWDER, LYOPHILIZED, FOR SOLUTION INTRAVENOUS at 15:30

## 2018-08-25 RX ADMIN — PIPERACILLIN AND TAZOBACTAM 25 GRAM(S): 4; .5 INJECTION, POWDER, LYOPHILIZED, FOR SOLUTION INTRAVENOUS at 06:11

## 2018-08-25 RX ADMIN — Medication 1: at 08:53

## 2018-08-25 RX ADMIN — Medication 1: at 17:16

## 2018-08-25 NOTE — PROGRESS NOTE ADULT - SUBJECTIVE AND OBJECTIVE BOX
chief complaint : shortness of breath        SUBJECTIVE / OVERNIGHT EVENTS: unable to obtain ros from pt , pt lethargic intermittently opening eyes asking for water ,  pts family next to pts bed    MEDICATIONS  (STANDING):  dextrose 5% + sodium chloride 0.45% with potassium chloride 20 mEq/L 1000 milliLiter(s) (60 mL/Hr) IV Continuous <Continuous>  dextrose 5%. 1000 milliLiter(s) (50 mL/Hr) IV Continuous <Continuous>  dextrose 50% Injectable 12.5 Gram(s) IV Push once  dextrose 50% Injectable 25 Gram(s) IV Push once  dextrose 50% Injectable 25 Gram(s) IV Push once  insulin lispro (HumaLOG) corrective regimen sliding scale   SubCutaneous three times a day before meals  insulin lispro (HumaLOG) corrective regimen sliding scale   SubCutaneous at bedtime  piperacillin/tazobactam IVPB. 3.375 Gram(s) IV Intermittent every 8 hours    MEDICATIONS  (PRN):  acetaminophen  Suppository 650 milliGRAM(s) Rectal every 6 hours PRN For Temp greater than 38 C (100.4 F)  acetaminophen  Suppository. 650 milliGRAM(s) Rectal every 6 hours PRN Mild Pain (1 - 3)  dextrose 40% Gel 15 Gram(s) Oral once PRN Blood Glucose LESS THAN 70 milliGRAM(s)/deciliter  glucagon  Injectable 1 milliGRAM(s) IntraMuscular once PRN Glucose LESS THAN 70 milligrams/deciliter  HYDROmorphone  Injectable 0.2 milliGRAM(s) IV Push every 3 hours PRN tachypnic / sob  ondansetron Injectable 4 milliGRAM(s) IV Push every 6 hours PRN Nausea    Vital Signs Last 24 Hrs  T(C): 36.3 (25 Aug 2018 14:03), Max: 36.8 (25 Aug 2018 05:51)  T(F): 97.3 (25 Aug 2018 14:03), Max: 98.2 (25 Aug 2018 05:51)  HR: 90 (25 Aug 2018 19:22) (68 - 90)  BP: 137/60 (25 Aug 2018 14:03) (107/80 - 137/60)  BP(mean): --  RR: 20 (25 Aug 2018 14:03) (17 - 20)  SpO2: 96% (25 Aug 2018 19:22) (95% - 100%)    unable to obtain ros from pt - pt lethargic      PHYSICAL EXAM:  CHEST/LUNG: bronchial breath sounds + ant  HEART:  S1 , S2 +  ABDOMEN: soft, bs+  EXTREMITIES: trace edema  NEUROLOGY: lethargic, intermittently opens eyes and mumbles    LABS:      150<H>  |  116<H>  |  13  ----------------------------<  128<H>  3.4<L>   |  24  |  1.22    Ca    9.4      24 Aug 2018 07:06  Phos  2.5       Mg     1.7         TPro  6.2  /  Alb  2.0<L>  /  TBili  1.0  /  DBili      /  AST  40  /  ALT  23  /  AlkPhos  81      Creatinine Trend: 1.22 <--, 1.19 <--, 1.46 <--, 1.61 <--, 1.80 <--, 2.08 <--                        12.8   6.88  )-----------( 131      ( 24 Aug 2018 07:06 )             40.2     Urine Studies:  Urinalysis Basic - ( 20 Aug 2018 15:10 )    Color: YELLOW / Appearance: HAZY / S.021 / pH: 5.5  Gluc: TRACE / Ketone: NEGATIVE  / Bili: SMALL / Urobili: 1   Blood: NEGATIVE / Protein: 30 / Nitrite: NEGATIVE   Leuk Esterase: NEGATIVE / RBC: 3-5 / WBC 2-5   Sq Epi: OCC / Non Sq Epi:  / Bacteria: FEW              LIVER FUNCTIONS - ( 24 Aug 2018 07:06 )  Alb: 2.0 g/dL / Pro: 6.2 g/dL / ALK PHOS: 81 u/L / ALT: 23 u/L / AST: 40 u/L / GGT: x

## 2018-08-25 NOTE — PROGRESS NOTE ADULT - SUBJECTIVE AND OBJECTIVE BOX
Emir Cox MD  Interventional Cardiology  [place] Office : [phone] [place] [id number]  Tel:   [place] Office : [address] [facility] [place]. [address]  Tel: [phone]  Cell : [phone]    Subjective : Pt lying in bed comfortable, not in distress, denies any chest pain or SOB  	  MEDICATIONS:    piperacillin/tazobactam IVPB. 3.375 Gram(s) IV Intermittent every 8 hours      acetaminophen  Suppository 650 milliGRAM(s) Rectal every 6 hours PRN  acetaminophen  Suppository. 650 milliGRAM(s) Rectal every 6 hours PRN  HYDROmorphone  Injectable 0.2 milliGRAM(s) IV Push every 3 hours PRN  ondansetron Injectable 4 milliGRAM(s) IV Push every 6 hours PRN      dextrose 40% Gel 15 Gram(s) Oral once PRN  dextrose 50% Injectable 12.5 Gram(s) IV Push once  dextrose 50% Injectable 25 Gram(s) IV Push once  dextrose 50% Injectable 25 Gram(s) IV Push once  glucagon  Injectable 1 milliGRAM(s) IntraMuscular once PRN  insulin lispro (HumaLOG) corrective regimen sliding scale   SubCutaneous three times a day before meals  insulin lispro (HumaLOG) corrective regimen sliding scale   SubCutaneous at bedtime    dextrose 5% + sodium chloride 0.45% with potassium chloride 20 mEq/L 1000 milliLiter(s) IV Continuous <Continuous>  dextrose 5%. 1000 milliLiter(s) IV Continuous <Continuous>      PHYSICAL EXAM:  T(C): 36.3 (08-25-18 @ 14:03), Max: 36.8 (08-25-18 @ 05:51)  HR: 79 (08-25-18 @ 16:45) (68 - 87)  BP: 137/60 (08-25-18 @ 14:03) (107/80 - 137/60)  RR: 20 (08-25-18 @ 14:03) (17 - 20)  SpO2: 97% (08-25-18 @ 16:45) (95% - 100%)  Wt(kg): --  I&O's Summary        Appearance: Normal	  HEENT:   Normal oral mucosa, PERRL, EOMI	  Cardiovascular: Normal S1 S2, No JVD, No murmurs, No edema  Respiratory: Lungs clear to auscultation	  Gastrointestinal:  Soft, Non-tender, + BS	  Extremities: Normal range of motion, No clubbing, cyanosis or edema                                    12.8   6.88  )-----------( 131      ( 24 Aug 2018 07:06 )             40.2     08-24    150<H>  |  116<H>  |  13  ----------------------------<  128<H>  3.4<L>   |  24  |  1.22    Ca    9.4      24 Aug 2018 07:06  Phos  2.5     08-24  Mg     1.7     08-24    TPro  6.2  /  Alb  2.0<L>  /  TBili  1.0  /  DBili  x   /  AST  40  /  ALT  23  /  AlkPhos  81  08-24    proBNP:   Lipid Profile:   HgA1c:   TSH:

## 2018-08-25 NOTE — PROGRESS NOTE ADULT - SUBJECTIVE AND OBJECTIVE BOX
ASPEN AVILEZ  HPI:  This is a patient with ERIKA and Sepsis. He is not febrile or dyspneic.    HEALTH ISSUES - PROBLEM Dx:  Hypernatremia: Hypernatremia  Electrolyte abnormality: Electrolyte abnormality  Aspiration pneumonia: Aspiration pneumonia  Fever: Fever  Bacteremia due to Escherichia coli: Bacteremia due to Escherichia coli  Sepsis due to Escherichia coli: Sepsis due to Escherichia coli  DM (diabetes mellitus): DM (diabetes mellitus)  Sepsis, due to unspecified organism: Sepsis, due to unspecified organism  Acidosis: Acidosis  Altered mental status: Altered mental status  Hypercalcemia: Hypercalcemia  Stage 3 chronic kidney disease: Stage 3 chronic kidney disease  ERIKA (acute kidney injury): ERIKA (acute kidney injury)  Palliative care encounter: Palliative care encounter  Debility: Debility  Tachypnea: Tachypnea  Multiple myeloma: Multiple myeloma        MEDICATIONS  (STANDING):  dextrose 5% + sodium chloride 0.45% with potassium chloride 20 mEq/L 1000 milliLiter(s) (60 mL/Hr) IV Continuous <Continuous>  dextrose 5%. 1000 milliLiter(s) (50 mL/Hr) IV Continuous <Continuous>  dextrose 50% Injectable 12.5 Gram(s) IV Push once  dextrose 50% Injectable 25 Gram(s) IV Push once  dextrose 50% Injectable 25 Gram(s) IV Push once  insulin lispro (HumaLOG) corrective regimen sliding scale   SubCutaneous three times a day before meals  insulin lispro (HumaLOG) corrective regimen sliding scale   SubCutaneous at bedtime  piperacillin/tazobactam IVPB. 3.375 Gram(s) IV Intermittent every 8 hours    MEDICATIONS  (PRN):  acetaminophen  Suppository 650 milliGRAM(s) Rectal every 6 hours PRN For Temp greater than 38 C (100.4 F)  acetaminophen  Suppository. 650 milliGRAM(s) Rectal every 6 hours PRN Mild Pain (1 - 3)  dextrose 40% Gel 15 Gram(s) Oral once PRN Blood Glucose LESS THAN 70 milliGRAM(s)/deciliter  glucagon  Injectable 1 milliGRAM(s) IntraMuscular once PRN Glucose LESS THAN 70 milligrams/deciliter  HYDROmorphone  Injectable 0.2 milliGRAM(s) IV Push every 3 hours PRN tachypnic / sob  ondansetron Injectable 4 milliGRAM(s) IV Push every 6 hours PRN Nausea    Vital Signs Last 24 Hrs  T(C): 36.8 (25 Aug 2018 05:51), Max: 36.8 (25 Aug 2018 05:51)  T(F): 98.2 (25 Aug 2018 05:51), Max: 98.2 (25 Aug 2018 05:51)  HR: 83 (25 Aug 2018 11:29) (68 - 87)  BP: 131/73 (25 Aug 2018 05:51) (107/80 - 131/73)  BP(mean): --  RR: 17 (25 Aug 2018 05:51) (17 - 18)  SpO2: 97% (25 Aug 2018 11:29) (95% - 100%)  Daily     Daily     PHYSICAL EXAM:  Constitutional:  He appears comfortable and not distressed. Not diaphoretic. Dry mucus membranes.    Neck:  The thyroid is normal. Trachea is midline.     Breasts: Normal examination.    Respiratory: The lungs are clear to auscultation. No dullness and expansion is normal.    Cardiovascular: S1 and S2 are normal. No mummurs, rubs or gallops are present.    Gastrointestinal: The abdomen is soft. No tenderness is present. No masses are present. Bowel sounds are normal.    Genitourinary: The bladder is not distended. No CVA tenderness is present.    Extremities: No edema is noted. No deformities are present.    Neurological: Alert, moves all extremities.    Skin: No lesions are seen  or palpated.    Lymph Nodes: No lymphadenopathy is present.    Psychiatric: Mood is appropriate. No hallucinations or flight of ideas are noted.                              12.8   6.88  )-----------( 131      ( 24 Aug 2018 07:06 )             40.2     08-24    150<H>  |  116<H>  |  13  ----------------------------<  128<H>  3.4<L>   |  24  |  1.22    Ca    9.4      24 Aug 2018 07:06  Phos  2.5     08-24  Mg     1.7     08-24    TPro  6.2  /  Alb  2.0<L>  /  TBili  1.0  /  DBili  x   /  AST  40  /  ALT  23  /  AlkPhos  81  08-24

## 2018-08-26 ENCOUNTER — TRANSCRIPTION ENCOUNTER (OUTPATIENT)
Age: 83
End: 2018-08-26

## 2018-08-26 LAB
BUN SERPL-MCNC: 8 MG/DL — SIGNIFICANT CHANGE UP (ref 7–23)
CALCIUM SERPL-MCNC: 9.1 MG/DL — SIGNIFICANT CHANGE UP (ref 8.4–10.5)
CHLORIDE SERPL-SCNC: 107 MMOL/L — SIGNIFICANT CHANGE UP (ref 98–107)
CO2 SERPL-SCNC: 20 MMOL/L — LOW (ref 22–31)
CREAT SERPL-MCNC: 1.18 MG/DL — SIGNIFICANT CHANGE UP (ref 0.5–1.3)
GLUCOSE BLDC GLUCOMTR-MCNC: 118 MG/DL — HIGH (ref 70–99)
GLUCOSE BLDC GLUCOMTR-MCNC: 160 MG/DL — HIGH (ref 70–99)
GLUCOSE BLDC GLUCOMTR-MCNC: 162 MG/DL — HIGH (ref 70–99)
GLUCOSE SERPL-MCNC: 141 MG/DL — HIGH (ref 70–99)
HCT VFR BLD CALC: 39.3 % — SIGNIFICANT CHANGE UP (ref 39–50)
HGB BLD-MCNC: 12.6 G/DL — LOW (ref 13–17)
MAGNESIUM SERPL-MCNC: 1.5 MG/DL — LOW (ref 1.6–2.6)
MCHC RBC-ENTMCNC: 28.3 PG — SIGNIFICANT CHANGE UP (ref 27–34)
MCHC RBC-ENTMCNC: 32.1 % — SIGNIFICANT CHANGE UP (ref 32–36)
MCV RBC AUTO: 88.1 FL — SIGNIFICANT CHANGE UP (ref 80–100)
NRBC # FLD: 0 — SIGNIFICANT CHANGE UP
PHOSPHATE SERPL-MCNC: 2 MG/DL — LOW (ref 2.5–4.5)
PLATELET # BLD AUTO: 187 K/UL — SIGNIFICANT CHANGE UP (ref 150–400)
PMV BLD: 9.5 FL — SIGNIFICANT CHANGE UP (ref 7–13)
POTASSIUM SERPL-MCNC: 3.6 MMOL/L — SIGNIFICANT CHANGE UP (ref 3.5–5.3)
POTASSIUM SERPL-SCNC: 3.6 MMOL/L — SIGNIFICANT CHANGE UP (ref 3.5–5.3)
RBC # BLD: 4.46 M/UL — SIGNIFICANT CHANGE UP (ref 4.2–5.8)
RBC # FLD: 15.1 % — HIGH (ref 10.3–14.5)
SODIUM SERPL-SCNC: 139 MMOL/L — SIGNIFICANT CHANGE UP (ref 135–145)
SPECIMEN SOURCE: SIGNIFICANT CHANGE UP
SPECIMEN SOURCE: SIGNIFICANT CHANGE UP
WBC # BLD: 7.22 K/UL — SIGNIFICANT CHANGE UP (ref 3.8–10.5)
WBC # FLD AUTO: 7.22 K/UL — SIGNIFICANT CHANGE UP (ref 3.8–10.5)

## 2018-08-26 RX ORDER — MAGNESIUM SULFATE 500 MG/ML
1 VIAL (ML) INJECTION ONCE
Qty: 0 | Refills: 0 | Status: COMPLETED | OUTPATIENT
Start: 2018-08-26 | End: 2018-08-26

## 2018-08-26 RX ADMIN — PIPERACILLIN AND TAZOBACTAM 25 GRAM(S): 4; .5 INJECTION, POWDER, LYOPHILIZED, FOR SOLUTION INTRAVENOUS at 06:12

## 2018-08-26 RX ADMIN — Medication 1: at 17:24

## 2018-08-26 RX ADMIN — Medication 100 GRAM(S): at 13:15

## 2018-08-26 RX ADMIN — PIPERACILLIN AND TAZOBACTAM 25 GRAM(S): 4; .5 INJECTION, POWDER, LYOPHILIZED, FOR SOLUTION INTRAVENOUS at 22:18

## 2018-08-26 RX ADMIN — PIPERACILLIN AND TAZOBACTAM 25 GRAM(S): 4; .5 INJECTION, POWDER, LYOPHILIZED, FOR SOLUTION INTRAVENOUS at 15:20

## 2018-08-26 RX ADMIN — Medication 1: at 09:03

## 2018-08-26 NOTE — DISCHARGE NOTE ADULT - PLAN OF CARE
Decreased coughing, afebrile one to one for feeding.  Eat a puree diet with nectar thickened liquids to prevent aspiration. Complete antibiotic therapy for infection.    Monitor for fever, chills. Normalized. Home with hospice for comfort measures. You were seen by Infectious Disease in-hospital.1:1 for feeding.  Eat a puree diet with nectar thickened liquids to prevent aspiration. Stable. Complete antibiotic therapy as directed.  Monitor for any further signs and symptoms of further infection, including but not limited to, fevers/chills, shortness of breath, increased heart rate, dizziness, or abrupt changes in mental status. You were seen by Infectious Disease in-hospital. Complete antibiotic therapy as directed.  Monitor for any further signs and symptoms of further infection, including but not limited to, fevers/chills, shortness of breath, increased heart rate, dizziness, or abrupt changes in mental status. You were seen by Nephrology in-hospital. Renal US was insignificant. Stable. Follow up outpatient PCP in 1-2 weeks for further management as warranted. You were seen by Infectious Disease in-hospital. 1:1 for feeding.  Eat a puree diet with nectar thickened liquids to prevent aspiration. Stable. Complete antibiotic therapy as directed.  Monitor for any further signs and symptoms of further infection, including but not limited to, fevers/chills, shortness of breath, increased heart rate, dizziness, or abrupt changes in mental status.

## 2018-08-26 NOTE — DISCHARGE NOTE ADULT - HOSPITAL COURSE
88 year old man with DM, HTN, MM, Alzheimer's, CAD s/p bypass c/b afib p/w cough, fever. possible aspiration PNA, found to have ERIKA and hypercalcemia     Hospital Course:  Ecoli Bacteremia  -UCX NTD   -Zosyn , -DC vanco.  -CT a/p Moderate pathologic compression fracture deformity of L3, new since 6/1/2018 and CT abdomen and pelvis. No bony retropulsion.   Redemonstration of small osseous lytic lesions throughout the axial and appendical skeleton, as seen on prior studies, which may represent metastatic disease versus multiple myeloma.  Bibasilar and middle lobe opacities may represent a combination of   atelectasis and/or infection. Small pleural effusions.    ERIKA /hypotension   -renal US r/o obstruction  s/p 3L NS bolus   Renal US r/o obstruction--No hydronephrosis.    Hypercalcemia/ hx of multiple myeloma   - Palliative on board - DNR/DNI  - Family decided against aggressive tx and opted to pursue hospice    HTN   -hypotensive s/p multiple NS bolus.     Altered mental status/sepsis   CXR- negative  U/A- negative  RVP-negative  BCx---Gram neg rods  on vanc/zosyn  Repeat Blood cx-NGTD------------    Acidosis  -8/21 started on HCO3 gtt     Type 2 DM  - Hg A1c 7.3    Compression fracture  - Ortho consulted - no intervention     Dispo- stable for home with hospice 88 M PMHx Alzehemers (A+Ox1), HTN, DM, CAD S/P bypass c/b A-fib (not on ASA, on home hospice), MM (not on treatment, on home hospice) p/w SOB, cough with vomiting. Upon arrival, pt was noted to be  hypotensive (80/52), tachycardic, tachypneic, hypoxic (83 on RA), febrile (102.3). S/P 500 cc with improvement to 106/78. Placed on 15 L NRB, changed to BIPAP. Evaluated by MICU, rejected. Admitted for possible aspiration PNA, found to have ERIKA and hypercalcemia 	    Sepsis/ AMS   -Fever, lactate 11  -UA negative, RVP negative, CXR negative   -BCx 8/20 growing E. coli, BCx 8/22 growing K. pneumonia, BCx 8/25 negative   -Vanco/ Zosyn, Vanco d'hamlet 8/21   -Dilaudid IV q3 PRN   -Stable, F/U outpatient PCP     Bacteremia  -As above   -CT A/P - moderate pathologic compression fracture deformity of L3 (new). Redemonstration of small osseous lytic lesions throughout the axial and appendical skeleton, as seen on prior studies, which may represent metastatic disease versus multiple myeloma. Bibasilar and middle lobe opacities may represent a combination of atelectasis and/or infection. Small pleural effusions.  -Stable, F/U outpatient PCP     Compression fracture  -Ortho Cx - no intervention  -Stable, F/U outpatient PCP     ERIKA /hypotension   -Renal US WNL   -Stable, F/U outpatient PCP     Acidosis  -8/21 - started on HCO3 gtt, d'hamlet 8/22   -Stable, F/U outpatient PCP     SVT @ 159  -Cardio Cx - now in NSR, likely 2/2 aspiration PNA, continue Lopressor   -Stable, F/U outpatient PCP     Hypercalcemia/ Hx of multiple myeloma   -Palliative Cx - DNR/DNI, family decided against aggressive tx, opted to pursue hospice  -Stable, F/U outpatient PCP     HTN   -Hypotensive S/P multiple NS bolus.   -Stable, F/U outpatient PCP     Type 2 DM  -Hg A1c 7.3  -ISS   -Stable, F/U outpatient PCP       As per attending, pt medically stable for discharge to home with home hospice 88 M PMHx Alzehemers (A+Ox1), HTN, DM, CAD S/P bypass c/b A-fib (not on ASA, on home hospice), MM (not on treatment, on home hospice) p/w SOB, cough with vomiting. Upon arrival, pt was noted to be  hypotensive (80/52), tachycardic, tachypneic, hypoxic (83 on RA), febrile (102.3). S/P 500 cc with improvement to 106/78. Placed on 15 L NRB, changed to BIPAP. Evaluated by MICU, rejected. Admitted for possible aspiration PNA, found to have ERIKA and hypercalcemia 	    Sepsis/ AMS   -Fever, lactate 11  -UA negative, RVP negative, CXR negative   -BCx 8/20 growing E. coli, BCx 8/22 growing K. pneumonia, BCx 8/25 negative   -Vanco/ Zosyn, Vanco d'hamlet 8/21   -Dilaudid PRN   -Stable, F/U outpatient PCP     Bacteremia  -As above   -CT A/P - moderate pathologic compression fracture deformity of L3 (new). Redemonstration of small osseous lytic lesions throughout the axial and appendical skeleton, as seen on prior studies, which may represent metastatic disease versus multiple myeloma. Bibasilar and middle lobe opacities may represent a combination of atelectasis and/or infection. Small pleural effusions.  -Stable, F/U outpatient PCP     Compression fracture  -Ortho Cx - no intervention  -Stable, F/U outpatient PCP     ERIKA /hypotension   -Renal US WNL   -Stable, F/U outpatient PCP     Acidosis  -8/21 - started on HCO3 gtt, d'hamlet 8/22   -Stable, F/U outpatient PCP     SVT @ 159  -Cardio Cx - now in NSR, likely 2/2 aspiration PNA, continue Lopressor   -Stable, F/U outpatient PCP     Hypercalcemia/ Hx of multiple myeloma   -Palliative Cx - DNR/DNI, family decided against aggressive tx, opted to pursue hospice  -Stable, F/U outpatient PCP     HTN   -Hypotensive S/P multiple NS bolus.   -Stable, F/U outpatient PCP     Type 2 DM  -Hg A1c 7.3  -ISS   -Stable, F/U outpatient PCP       As per attending, pt medically stable for discharge to home with home hospice

## 2018-08-26 NOTE — DISCHARGE NOTE ADULT - MEDICATION SUMMARY - MEDICATIONS TO TAKE
I will START or STAY ON the medications listed below when I get home from the hospital:    acetaminophen 650 mg rectal suppository  -- 1 suppository(ies) rectally every 6 hours, As needed, Mild Pain (1 - 3)  -- Indication: For Mild pain    Januvia 50 mg oral tablet  -- 1 tab(s) by mouth once a day  -- Indication: For Diabetes    ciprofloxacin 500 mg oral tablet  -- 1 tab(s) by mouth once a day  -- Indication: For Bacteremia due to Escherichia coli

## 2018-08-26 NOTE — DISCHARGE NOTE ADULT - CARE PROVIDER_API CALL
Yazan Pena), Internal Medicine  [address] [place] [address]  Phone: [phone]  Fax: [phone]

## 2018-08-26 NOTE — DISCHARGE NOTE ADULT - PATIENT PORTAL LINK FT
You can access the [facility], offered by [facility], by registering with the following website: [email or web address]

## 2018-08-26 NOTE — PROGRESS NOTE ADULT - SUBJECTIVE AND OBJECTIVE BOX
chief complaint : shortness of breath        SUBJECTIVE / OVERNIGHT EVENTS: unable to obtain ros from pt , pt lethargic intermittently opening eyes asking for water ,  pts family next to pts bed    MEDICATIONS  (STANDING):  dextrose 5%. 1000 milliLiter(s) (50 mL/Hr) IV Continuous <Continuous>  dextrose 5%. 1000 milliLiter(s) (60 mL/Hr) IV Continuous <Continuous>  dextrose 50% Injectable 12.5 Gram(s) IV Push once  dextrose 50% Injectable 25 Gram(s) IV Push once  dextrose 50% Injectable 25 Gram(s) IV Push once  insulin lispro (HumaLOG) corrective regimen sliding scale   SubCutaneous three times a day before meals  insulin lispro (HumaLOG) corrective regimen sliding scale   SubCutaneous at bedtime  piperacillin/tazobactam IVPB. 3.375 Gram(s) IV Intermittent every 8 hours    MEDICATIONS  (PRN):  acetaminophen  Suppository 650 milliGRAM(s) Rectal every 6 hours PRN For Temp greater than 38 C (100.4 F)  acetaminophen  Suppository. 650 milliGRAM(s) Rectal every 6 hours PRN Mild Pain (1 - 3)  dextrose 40% Gel 15 Gram(s) Oral once PRN Blood Glucose LESS THAN 70 milliGRAM(s)/deciliter  glucagon  Injectable 1 milliGRAM(s) IntraMuscular once PRN Glucose LESS THAN 70 milligrams/deciliter  HYDROmorphone  Injectable 0.2 milliGRAM(s) IV Push every 3 hours PRN tachypnic / sob  ondansetron Injectable 4 milliGRAM(s) IV Push every 6 hours PRN Nausea    Vital Signs Last 24 Hrs  T(C): 37.1 (26 Aug 2018 21:14), Max: 37.1 (26 Aug 2018 21:14)  T(F): 98.8 (26 Aug 2018 21:14), Max: 98.8 (26 Aug 2018 21:14)  HR: 74 (26 Aug 2018 21:14) (74 - 88)  BP: 138/64 (26 Aug 2018 21:14) (102/60 - 138/64)  BP(mean): --  RR: 18 (26 Aug 2018 21:14) (18 - 18)  SpO2: 100% (26 Aug 2018 21:14) (95% - 100%)    unable to obtain ros from pt - pt lethargic      PHYSICAL EXAM:  CHEST/LUNG: bronchial breath sounds + ant  HEART:  S1 , S2 +  ABDOMEN: soft, bs+  EXTREMITIES: trace edema  NEUROLOGY: lethargic, intermittently opens eyes and mumbles  LABS:      139  |  107  |  8   ----------------------------<  141<H>  3.6   |  20<L>  |  1.18    Ca    9.1      26 Aug 2018 08:03  Phos  2.0       Mg     1.5           Creatinine Trend: 1.18 <--, 1.22 <--, 1.19 <--, 1.46 <--, 1.61 <--, 1.80 <--, 2.08 <--                        12.6   7.22  )-----------( 187      ( 26 Aug 2018 08:03 )             39.3     Urine Studies:  Urinalysis Basic - ( 20 Aug 2018 15:10 )    Color: YELLOW / Appearance: HAZY / S.021 / pH: 5.5  Gluc: TRACE / Ketone: NEGATIVE  / Bili: SMALL / Urobili: 1   Blood: NEGATIVE / Protein: 30 / Nitrite: NEGATIVE   Leuk Esterase: NEGATIVE / RBC: 3-5 / WBC 2-5   Sq Epi: OCC / Non Sq Epi:  / Bacteria: FEW

## 2018-08-26 NOTE — DISCHARGE NOTE ADULT - MEDICATION SUMMARY - MEDICATIONS TO STOP TAKING
I will STOP taking the medications listed below when I get home from the hospital:    metoprolol succinate 100 mg oral tablet, extended release  -- 1 tab(s) by mouth once a day    Ativan 0.5 mg oral tablet  -- Take 1 to 2 tablets by mouth every 4 hours as needed    Morphine 100mg/5ml Solution  -- give 2.5mg to 5mg by mouth every 4 hours prn

## 2018-08-26 NOTE — PROGRESS NOTE ADULT - SUBJECTIVE AND OBJECTIVE BOX
Emir Cox MD  Interventional Cardiology  [place] Office : [address] [place] [address]  Tel:   [place] Office : [address] [facility] [place] [address]  Tel: [phone]  Cell : [phone]    Subjective : Pt lying in bed comfortable, not in distress, denies any chest pain or SOB  	  MEDICATIONS:    piperacillin/tazobactam IVPB. 3.375 Gram(s) IV Intermittent every 8 hours      acetaminophen  Suppository 650 milliGRAM(s) Rectal every 6 hours PRN  acetaminophen  Suppository. 650 milliGRAM(s) Rectal every 6 hours PRN  HYDROmorphone  Injectable 0.2 milliGRAM(s) IV Push every 3 hours PRN  ondansetron Injectable 4 milliGRAM(s) IV Push every 6 hours PRN      dextrose 40% Gel 15 Gram(s) Oral once PRN  dextrose 50% Injectable 12.5 Gram(s) IV Push once  dextrose 50% Injectable 25 Gram(s) IV Push once  dextrose 50% Injectable 25 Gram(s) IV Push once  glucagon  Injectable 1 milliGRAM(s) IntraMuscular once PRN  insulin lispro (HumaLOG) corrective regimen sliding scale   SubCutaneous three times a day before meals  insulin lispro (HumaLOG) corrective regimen sliding scale   SubCutaneous at bedtime    dextrose 5%. 1000 milliLiter(s) IV Continuous <Continuous>  dextrose 5%. 1000 milliLiter(s) IV Continuous <Continuous>      PHYSICAL EXAM:  T(C): 37.1 (08-26-18 @ 21:14), Max: 37.1 (08-26-18 @ 21:14)  HR: 74 (08-26-18 @ 21:14) (74 - 88)  BP: 138/64 (08-26-18 @ 21:14) (102/60 - 138/64)  RR: 18 (08-26-18 @ 21:14) (18 - 18)  SpO2: 100% (08-26-18 @ 21:14) (95% - 100%)  Wt(kg): --  I&O's Summary        Appearance: Normal	  HEENT:   Normal oral mucosa, PERRL, EOMI	  Cardiovascular: Normal S1 S2, No JVD, No murmurs, No edema  Respiratory: Lungs clear to auscultation	  Gastrointestinal:  Soft, Non-tender, + BS	  Extremities: Normal range of motion, No clubbing, cyanosis or edema                                    12.6   7.22  )-----------( 187      ( 26 Aug 2018 08:03 )             39.3     08-26    139  |  107  |  8   ----------------------------<  141<H>  3.6   |  20<L>  |  1.18    Ca    9.1      26 Aug 2018 08:03  Phos  2.0     08-26  Mg     1.5     08-26      proBNP:   Lipid Profile:   HgA1c:   TSH:

## 2018-08-26 NOTE — PROGRESS NOTE ADULT - SUBJECTIVE AND OBJECTIVE BOX
ASPEN AVILEZ  HPI:  Followed for ERIKA and hypernatremia. He was given IV with response yesterday. No new complaints.    HEALTH ISSUES - PROBLEM Dx:  Hypernatremia: Hypernatremia  Electrolyte abnormality: Electrolyte abnormality  Aspiration pneumonia: Aspiration pneumonia  Fever: Fever  Bacteremia due to Escherichia coli: Bacteremia due to Escherichia coli  Sepsis due to Escherichia coli: Sepsis due to Escherichia coli  DM (diabetes mellitus): DM (diabetes mellitus)  Sepsis, due to unspecified organism: Sepsis, due to unspecified organism  Acidosis: Acidosis  Altered mental status: Altered mental status  Hypercalcemia: Hypercalcemia  Stage 3 chronic kidney disease: Stage 3 chronic kidney disease  ERIKA (acute kidney injury): ERIKA (acute kidney injury)  Palliative care encounter: Palliative care encounter  Debility: Debility  Tachypnea: Tachypnea  Multiple myeloma: Multiple myeloma        MEDICATIONS  (STANDING):  dextrose 5%. 1000 milliLiter(s) (50 mL/Hr) IV Continuous <Continuous>  dextrose 5%. 1000 milliLiter(s) (60 mL/Hr) IV Continuous <Continuous>  dextrose 50% Injectable 12.5 Gram(s) IV Push once  dextrose 50% Injectable 25 Gram(s) IV Push once  dextrose 50% Injectable 25 Gram(s) IV Push once  insulin lispro (HumaLOG) corrective regimen sliding scale   SubCutaneous three times a day before meals  insulin lispro (HumaLOG) corrective regimen sliding scale   SubCutaneous at bedtime  piperacillin/tazobactam IVPB. 3.375 Gram(s) IV Intermittent every 8 hours    MEDICATIONS  (PRN):  acetaminophen  Suppository 650 milliGRAM(s) Rectal every 6 hours PRN For Temp greater than 38 C (100.4 F)  acetaminophen  Suppository. 650 milliGRAM(s) Rectal every 6 hours PRN Mild Pain (1 - 3)  dextrose 40% Gel 15 Gram(s) Oral once PRN Blood Glucose LESS THAN 70 milliGRAM(s)/deciliter  glucagon  Injectable 1 milliGRAM(s) IntraMuscular once PRN Glucose LESS THAN 70 milligrams/deciliter  HYDROmorphone  Injectable 0.2 milliGRAM(s) IV Push every 3 hours PRN tachypnic / sob  ondansetron Injectable 4 milliGRAM(s) IV Push every 6 hours PRN Nausea    Vital Signs Last 24 Hrs  T(C): 36.9 (26 Aug 2018 13:45), Max: 36.9 (25 Aug 2018 21:21)  T(F): 98.4 (26 Aug 2018 13:45), Max: 98.4 (25 Aug 2018 21:21)  HR: 74 (26 Aug 2018 15:29) (74 - 90)  BP: 122/54 (26 Aug 2018 13:45) (102/60 - 131/69)  BP(mean): --  RR: 18 (26 Aug 2018 13:45) (18 - 18)  SpO2: 98% (26 Aug 2018 15:29) (95% - 99%)  Daily     Daily     PHYSICAL EXAM:  Constitutional:  He appears comfortable and not distressed. Not diaphoretic. Dry oral mucosa.    Neck:  The thyroid is normal. Trachea is midline.     Breasts: Normal examination.    Respiratory: The lungs are clear to auscultation. No dullness and expansion is normal.    Cardiovascular: S1 and S2 are normal. No mummurs, rubs or gallops are present.    Gastrointestinal: The abdomen is soft. No tenderness is present. No masses are present. Bowel sounds are normal.    Genitourinary: The bladder is not distended. No CVA tenderness is present.    Extremities: No edema is noted. No deformities are present.    Neurological: Awake, bed bound.    Skin: Abrasion over nares.    Lymph Nodes: No lymphadenopathy is present.                          12.6   7.22  )-----------( 187      ( 26 Aug 2018 08:03 )             39.3     08-26    139  |  107  |  8   ----------------------------<  141<H>  3.6   |  20<L>  |  1.18    Ca    9.1      26 Aug 2018 08:03  Phos  2.0     08-26  Mg     1.5     08-26

## 2018-08-26 NOTE — DISCHARGE NOTE ADULT - COMMUNITY RESOURCES
[facility] [phone].  [facility] [phone]. [facility] [phone].  [facility] [phone]. Trip# [id number].

## 2018-08-26 NOTE — DISCHARGE NOTE ADULT - CARE PLAN
Principal Discharge DX:	Aspiration pneumonia  Goal:	Decreased coughing, afebrile  Assessment and plan of treatment:	one to one for feeding.  Eat a puree diet with nectar thickened liquids to prevent aspiration.  Secondary Diagnosis:	Bacteremia due to Escherichia coli  Assessment and plan of treatment:	Complete antibiotic therapy for infection.    Monitor for fever, chills.  Secondary Diagnosis:	Hypernatremia  Assessment and plan of treatment:	Normalized.  Secondary Diagnosis:	Palliative care encounter  Assessment and plan of treatment:	Home with hospice for comfort measures. Principal Discharge DX:	Aspiration pneumonia  Goal:	Decreased coughing, afebrile  Assessment and plan of treatment:	You were seen by Infectious Disease in-hospital.1:1 for feeding.  Eat a puree diet with nectar thickened liquids to prevent aspiration. Stable. Complete antibiotic therapy as directed.  Monitor for any further signs and symptoms of further infection, including but not limited to, fevers/chills, shortness of breath, increased heart rate, dizziness, or abrupt changes in mental status.  Secondary Diagnosis:	Bacteremia due to Escherichia coli  Assessment and plan of treatment:	You were seen by Infectious Disease in-hospital. Complete antibiotic therapy as directed.  Monitor for any further signs and symptoms of further infection, including but not limited to, fevers/chills, shortness of breath, increased heart rate, dizziness, or abrupt changes in mental status.  Secondary Diagnosis:	Hypernatremia  Assessment and plan of treatment:	You were seen by Nephrology in-hospital. Renal US was insignificant. Stable. Follow up outpatient PCP in 1-2 weeks for further management as warranted.  Secondary Diagnosis:	Palliative care encounter  Assessment and plan of treatment:	Home with hospice for comfort measures. Principal Discharge DX:	Aspiration pneumonia  Goal:	Decreased coughing, afebrile  Assessment and plan of treatment:	You were seen by Infectious Disease in-hospital. 1:1 for feeding.  Eat a puree diet with nectar thickened liquids to prevent aspiration. Stable. Complete antibiotic therapy as directed.  Monitor for any further signs and symptoms of further infection, including but not limited to, fevers/chills, shortness of breath, increased heart rate, dizziness, or abrupt changes in mental status.  Secondary Diagnosis:	Bacteremia due to Escherichia coli  Assessment and plan of treatment:	You were seen by Infectious Disease in-hospital. Complete antibiotic therapy as directed.  Monitor for any further signs and symptoms of further infection, including but not limited to, fevers/chills, shortness of breath, increased heart rate, dizziness, or abrupt changes in mental status.  Secondary Diagnosis:	Hypernatremia  Assessment and plan of treatment:	You were seen by Nephrology in-hospital. Renal US was insignificant. Stable. Follow up outpatient PCP in 1-2 weeks for further management as warranted.  Secondary Diagnosis:	Palliative care encounter  Assessment and plan of treatment:	Home with hospice for comfort measures.

## 2018-08-27 LAB
BACTERIA BLD CULT: SIGNIFICANT CHANGE UP
BUN SERPL-MCNC: 8 MG/DL — SIGNIFICANT CHANGE UP (ref 7–23)
CALCIUM SERPL-MCNC: 8.5 MG/DL — SIGNIFICANT CHANGE UP (ref 8.4–10.5)
CHLORIDE SERPL-SCNC: 107 MMOL/L — SIGNIFICANT CHANGE UP (ref 98–107)
CO2 SERPL-SCNC: 21 MMOL/L — LOW (ref 22–31)
CREAT SERPL-MCNC: 1.36 MG/DL — HIGH (ref 0.5–1.3)
GLUCOSE BLDC GLUCOMTR-MCNC: 108 MG/DL — HIGH (ref 70–99)
GLUCOSE BLDC GLUCOMTR-MCNC: 132 MG/DL — HIGH (ref 70–99)
GLUCOSE BLDC GLUCOMTR-MCNC: 146 MG/DL — HIGH (ref 70–99)
GLUCOSE SERPL-MCNC: 98 MG/DL — SIGNIFICANT CHANGE UP (ref 70–99)
HCT VFR BLD CALC: 37.3 % — LOW (ref 39–50)
HGB BLD-MCNC: 12.1 G/DL — LOW (ref 13–17)
MAGNESIUM SERPL-MCNC: 1.9 MG/DL — SIGNIFICANT CHANGE UP (ref 1.6–2.6)
MCHC RBC-ENTMCNC: 28.2 PG — SIGNIFICANT CHANGE UP (ref 27–34)
MCHC RBC-ENTMCNC: 32.4 % — SIGNIFICANT CHANGE UP (ref 32–36)
MCV RBC AUTO: 86.9 FL — SIGNIFICANT CHANGE UP (ref 80–100)
NRBC # FLD: 0 — SIGNIFICANT CHANGE UP
PHOSPHATE SERPL-MCNC: 2.9 MG/DL — SIGNIFICANT CHANGE UP (ref 2.5–4.5)
PLATELET # BLD AUTO: 179 K/UL — SIGNIFICANT CHANGE UP (ref 150–400)
PMV BLD: 10 FL — SIGNIFICANT CHANGE UP (ref 7–13)
POTASSIUM SERPL-MCNC: 3.6 MMOL/L — SIGNIFICANT CHANGE UP (ref 3.5–5.3)
POTASSIUM SERPL-SCNC: 3.6 MMOL/L — SIGNIFICANT CHANGE UP (ref 3.5–5.3)
RBC # BLD: 4.29 M/UL — SIGNIFICANT CHANGE UP (ref 4.2–5.8)
RBC # FLD: 15.3 % — HIGH (ref 10.3–14.5)
SODIUM SERPL-SCNC: 138 MMOL/L — SIGNIFICANT CHANGE UP (ref 135–145)
SPECIMEN SOURCE: SIGNIFICANT CHANGE UP
SPECIMEN SOURCE: SIGNIFICANT CHANGE UP
WBC # BLD: 7.63 K/UL — SIGNIFICANT CHANGE UP (ref 3.8–10.5)
WBC # FLD AUTO: 7.63 K/UL — SIGNIFICANT CHANGE UP (ref 3.8–10.5)

## 2018-08-27 PROCEDURE — 99232 SBSQ HOSP IP/OBS MODERATE 35: CPT

## 2018-08-27 RX ORDER — CIPROFLOXACIN LACTATE 400MG/40ML
500 VIAL (ML) INTRAVENOUS DAILY
Qty: 0 | Refills: 0 | Status: DISCONTINUED | OUTPATIENT
Start: 2018-08-28 | End: 2018-08-28

## 2018-08-27 RX ORDER — METOPROLOL TARTRATE 50 MG
1 TABLET ORAL
Qty: 0 | Refills: 0 | COMMUNITY

## 2018-08-27 RX ADMIN — PIPERACILLIN AND TAZOBACTAM 25 GRAM(S): 4; .5 INJECTION, POWDER, LYOPHILIZED, FOR SOLUTION INTRAVENOUS at 14:11

## 2018-08-27 RX ADMIN — PIPERACILLIN AND TAZOBACTAM 25 GRAM(S): 4; .5 INJECTION, POWDER, LYOPHILIZED, FOR SOLUTION INTRAVENOUS at 22:26

## 2018-08-27 RX ADMIN — PIPERACILLIN AND TAZOBACTAM 25 GRAM(S): 4; .5 INJECTION, POWDER, LYOPHILIZED, FOR SOLUTION INTRAVENOUS at 06:12

## 2018-08-27 NOTE — PROGRESS NOTE ADULT - ATTENDING COMMENTS
Meng Zafar  Attending Physician   Division of Infectious Disease  Pager #[phone]  After 5pm/weekend or no response, call #[phone]
as per pts family -comfort care , no aggressive measures
as per pts family -comfort care , no aggressive measures
Meng Zafar  Attending Physician   Division of Infectious Disease  Pager #[phone]  After 5pm/weekend or no response, call #[phone]    Please call the ID service [phone] with questions or concerns over the weekend.
Meng Zafar  Attending Physician   Division of Infectious Disease  Pager #[phone]  After 5pm/weekend or no response, call #[phone]

## 2018-08-27 NOTE — PROGRESS NOTE ADULT - PROBLEM SELECTOR PLAN 1
-Sepsis Likely secondary to aspiration PNA meets severe sepsis criteria wbc<4 bacteremia+   < from: CT Abdomen and Pelvis w/ IV Cont (08.23.18 @ 13:55) >    Moderate pathologic compression fracture deformity of L3, new since   6/1/2018 and CT abdomen and pelvis. No bony retropulsion.     Redemonstration of small osseous lytic lesions throughout the axial and   appendical skeleton, as seen on prior studies, which may represent   metastatic disease versus multiple myeloma.    Bibasilar and middle lobe opacities may represent a combination of   atelectasis and/or infection. Small pleural effusion  abx as per ID - ID recommends colonoscopy but given pts comorbid conditions and family wishes - no aggressive measure, comfort care only as per pts family

## 2018-08-27 NOTE — PROGRESS NOTE ADULT - PROBLEM SELECTOR PLAN 2
-as above  -can change to cipro 500 mg po daily x 7 days    D/w micro lab - klebsiella confirmed from blood cx 8/22, no further workup given planned for hospice

## 2018-08-27 NOTE — PROGRESS NOTE ADULT - SUBJECTIVE AND OBJECTIVE BOX
chief complaint : shortness of breath        SUBJECTIVE / OVERNIGHT EVENTS: unable to obtain ros from pt , pt lethargic intermittently opening eyes, confused   pts family next to pts bed    MEDICATIONS  (STANDING):  dextrose 5%. 1000 milliLiter(s) (50 mL/Hr) IV Continuous <Continuous>  dextrose 50% Injectable 12.5 Gram(s) IV Push once  dextrose 50% Injectable 25 Gram(s) IV Push once  dextrose 50% Injectable 25 Gram(s) IV Push once  insulin lispro (HumaLOG) corrective regimen sliding scale   SubCutaneous three times a day before meals  insulin lispro (HumaLOG) corrective regimen sliding scale   SubCutaneous at bedtime  piperacillin/tazobactam IVPB. 3.375 Gram(s) IV Intermittent every 8 hours    MEDICATIONS  (PRN):  acetaminophen  Suppository 650 milliGRAM(s) Rectal every 6 hours PRN For Temp greater than 38 C (100.4 F)  acetaminophen  Suppository. 650 milliGRAM(s) Rectal every 6 hours PRN Mild Pain (1 - 3)  dextrose 40% Gel 15 Gram(s) Oral once PRN Blood Glucose LESS THAN 70 milliGRAM(s)/deciliter  glucagon  Injectable 1 milliGRAM(s) IntraMuscular once PRN Glucose LESS THAN 70 milligrams/deciliter  HYDROmorphone  Injectable 0.2 milliGRAM(s) IV Push every 3 hours PRN tachypnic / sob  ondansetron Injectable 4 milliGRAM(s) IV Push every 6 hours PRN Nausea    Vital Signs Last 24 Hrs  T(C): 36.6 (27 Aug 2018 13:41), Max: 37.1 (26 Aug 2018 21:14)  T(F): 97.8 (27 Aug 2018 13:41), Max: 98.8 (26 Aug 2018 21:14)  HR: 77 (27 Aug 2018 16:27) (73 - 81)  BP: 125/82 (27 Aug 2018 13:41) (125/82 - 138/64)  BP(mean): --  RR: 18 (27 Aug 2018 13:41) (18 - 18)  SpO2: 96% (27 Aug 2018 16:27) (95% - 100%)  unable to obtain ros from pt - pt lethargic      PHYSICAL EXAM:  CHEST/LUNG: bronchial breath sounds + ant  HEART:  S1 , S2 +  ABDOMEN: soft, bs+  EXTREMITIES: trace edema  NEUROLOGY: lethargic, intermittently opens eyes and mumbles      LABS:  08-27    138  |  107  |  8   ----------------------------<  98  3.6   |  21<L>  |  1.36<H>    Ca    8.5      27 Aug 2018 06:17  Phos  2.9     08-27  Mg     1.9     08-27      Creatinine Trend: 1.36 <--, 1.18 <--, 1.22 <--, 1.19 <--, 1.46 <--, 1.61 <--, 1.80 <--                        12.1   7.63  )-----------( 179      ( 27 Aug 2018 06:17 )             37.3     Urine Studies:

## 2018-08-27 NOTE — PROGRESS NOTE ADULT - PROBLEM SELECTOR PLAN 6
hypokalemia, supplemented, monitor  hypomagnesemia: supplemented improved.  hypophos: supplemented monitor   monitor daily electrolyte, supplement as needed.

## 2018-08-27 NOTE — PROGRESS NOTE ADULT - SUBJECTIVE AND OBJECTIVE BOX
[facility] NE[facility]OLOGY [facility]ACT[facility]   MD RUDDY BACH MD ANGELA WONG, PA    TEL:  OFFICE: [phone]  DR FRANCES CELL: [phone]  DR. VILLALTA CELL: [phone]  SHAYNA BORREGO CELL: [phone]        Patient is a 88y old  Male who presents with a chief complaint of SOB (26 Aug 2018 14:27)      Patient seen and examined at bedside.     VITALS:  T(F): 98.8 (08-26-18 @ 21:14), Max: 98.8 (08-26-18 @ 21:14)  HR: 78 (08-27-18 @ 10:47)  BP: 138/64 (08-26-18 @ 21:14)  RR: 18 (08-26-18 @ 21:14)  SpO2: 95% (08-27-18 @ 10:47)  Wt(kg): --        PHYSICAL EXAM:  Constitutional: NAD  Neck: No JVD  Respiratory: b/l basilar rhonchi  Cardiovascular: S1, S2, RRR  Gastrointestinal: BS+, soft, NT/ND  Extremities: No peripheral edema    Hospital Medications:   MEDICATIONS  (STANDING):  dextrose 5%. 1000 milliLiter(s) (50 mL/Hr) IV Continuous <Continuous>  dextrose 50% Injectable 12.5 Gram(s) IV Push once  dextrose 50% Injectable 25 Gram(s) IV Push once  dextrose 50% Injectable 25 Gram(s) IV Push once  insulin lispro (HumaLOG) corrective regimen sliding scale   SubCutaneous three times a day before meals  insulin lispro (HumaLOG) corrective regimen sliding scale   SubCutaneous at bedtime  piperacillin/tazobactam IVPB. 3.375 Gram(s) IV Intermittent every 8 hours      LABS:  08-27    138  |  107  |  8   ----------------------------<  98  3.6   |  21<L>  |  1.36<H>    Ca    8.5      27 Aug 2018 06:17  Phos  2.9     08-27  Mg     1.9     08-27      Creatinine Trend: 1.36 <--, 1.18 <--, 1.22 <--, 1.19 <--, 1.46 <--, 1.61 <--, 1.80 <--    Phosphorus Level, Serum: 2.9 mg/dL (08-27 @ 06:17)                              12.1   7.63  )-----------( 179      ( 27 Aug 2018 06:17 )             37.3     Urine Studies:  Urinalysis - [08-20-18 @ 15:10]      Color YELLOW / Appearance HAZY / SG 1.021 / pH 5.5      Gluc TRACE / Ketone NEGATIVE  / Bili SMALL / Urobili 1       Blood NEGATIVE / Protein 30 / Leuk Est NEGATIVE / Nitrite NEGATIVE      RBC 3-5 / WBC 2-5 / Hyaline  / Gran  / Sq Epi OCC / Non Sq Epi  / Bacteria FEW      PTH 10.35 (Ca --)      [08-22-18 @ 06:04]   --  PTH 5.92 (Ca --)      [06-01-18 @ 06:10]   --  Vitamin D (25OH) 42.8      [06-01-18 @ 06:10]  HbA1c 7.3      [08-21-18 @ 06:00]  TSH 1.18      [06-04-18 @ 05:20]        RADIOLOGY & ADDITIONAL STUDIES: [facility]   MD RUDDY BACH MD ANGELA WONG, PA    TEL:  OFFICE: [phone]  DR FRANCES CELL: [phone]  DR. VILLALTA CELL: [phone]  SHAYNA BORREGO CELL: [phone]        Patient is a 88y old  Male who presents with a chief complaint of SOB       Patient seen and examined at bedside.     VITALS:  T(F): 98.8 (08-26-18 @ 21:14), Max: 98.8 (08-26-18 @ 21:14)  HR: 78 (08-27-18 @ 10:47)  BP: 138/64 (08-26-18 @ 21:14)  RR: 18 (08-26-18 @ 21:14)  SpO2: 95% (08-27-18 @ 10:47)  Wt(kg): --        PHYSICAL EXAM:  Constitutional: NAD  Neck: No JVD  Respiratory: b/l basilar rhonchi  Cardiovascular: S1, S2, RRR  Gastrointestinal: BS+, soft, NT/ND  Extremities: No peripheral edema    Hospital Medications:   MEDICATIONS  (STANDING):  dextrose 5%. 1000 milliLiter(s) (50 mL/Hr) IV Continuous <Continuous>  dextrose 50% Injectable 12.5 Gram(s) IV Push once  dextrose 50% Injectable 25 Gram(s) IV Push once  dextrose 50% Injectable 25 Gram(s) IV Push once  insulin lispro (HumaLOG) corrective regimen sliding scale   SubCutaneous three times a day before meals  insulin lispro (HumaLOG) corrective regimen sliding scale   SubCutaneous at bedtime  piperacillin/tazobactam IVPB. 3.375 Gram(s) IV Intermittent every 8 hours      LABS:  08-27    138  |  107  |  8   ----------------------------<  98  3.6   |  21<L>  |  1.36<H>    Ca    8.5      27 Aug 2018 06:17  Phos  2.9     08-27  Mg     1.9     08-27      Creatinine Trend: 1.36 <--, 1.18 <--, 1.22 <--, 1.19 <--, 1.46 <--, 1.61 <--, 1.80 <--    Phosphorus Level, Serum: 2.9 mg/dL (08-27 @ 06:17)                              12.1   7.63  )-----------( 179      ( 27 Aug 2018 06:17 )             37.3     Urine Studies:  Urinalysis - [08-20-18 @ 15:10]      Color YELLOW / Appearance HAZY / SG 1.021 / pH 5.5      Gluc TRACE / Ketone NEGATIVE  / Bili SMALL / Urobili 1       Blood NEGATIVE / Protein 30 / Leuk Est NEGATIVE / Nitrite NEGATIVE      RBC 3-5 / WBC 2-5 / Hyaline  / Gran  / Sq Epi OCC / Non Sq Epi  / Bacteria FEW      PTH 10.35 (Ca --)      [08-22-18 @ 06:04]   --  PTH 5.92 (Ca --)      [06-01-18 @ 06:10]   --  Vitamin D (25OH) 42.8      [06-01-18 @ 06:10]  HbA1c 7.3      [08-21-18 @ 06:00]  TSH 1.18      [06-04-18 @ 05:20]        RADIOLOGY & ADDITIONAL STUDIES:

## 2018-08-27 NOTE — PROGRESS NOTE ADULT - SUBJECTIVE AND OBJECTIVE BOX
Emir Cox MD  Interventional Cardiology / Advance Heart Failure and Cardiac Transplant Specialist  [employer] Office : [address] [place] [address]  Tel:   [place] Office : [address] [facility] [place] [address]  Tel: [phone]  Cell : [phone]    Subjective : Pt lying in bed comfortable, not in distress, denies any chest pain or SOB  	  MEDICATIONS:    piperacillin/tazobactam IVPB. 3.375 Gram(s) IV Intermittent every 8 hours      acetaminophen  Suppository 650 milliGRAM(s) Rectal every 6 hours PRN  acetaminophen  Suppository. 650 milliGRAM(s) Rectal every 6 hours PRN  HYDROmorphone  Injectable 0.2 milliGRAM(s) IV Push every 3 hours PRN  ondansetron Injectable 4 milliGRAM(s) IV Push every 6 hours PRN      dextrose 40% Gel 15 Gram(s) Oral once PRN  dextrose 50% Injectable 12.5 Gram(s) IV Push once  dextrose 50% Injectable 25 Gram(s) IV Push once  dextrose 50% Injectable 25 Gram(s) IV Push once  glucagon  Injectable 1 milliGRAM(s) IntraMuscular once PRN  insulin lispro (HumaLOG) corrective regimen sliding scale   SubCutaneous three times a day before meals  insulin lispro (HumaLOG) corrective regimen sliding scale   SubCutaneous at bedtime    dextrose 5%. 1000 milliLiter(s) IV Continuous <Continuous>      PHYSICAL EXAM:  T(C): 36.9 (08-27-18 @ 21:22), Max: 36.9 (08-27-18 @ 21:22)  HR: 79 (08-27-18 @ 21:22) (73 - 81)  BP: 118/587 (08-27-18 @ 21:22) (118/587 - 125/82)  RR: 18 (08-27-18 @ 21:22) (18 - 18)  SpO2: 99% (08-27-18 @ 21:22) (95% - 100%)  Wt(kg): --  I&O's Summary        Appearance: Normal	  HEENT:   Normal oral mucosa, PERRL, EOMI	  Cardiovascular: Normal S1 S2, No JVD, No murmurs, No edema  Respiratory: Lungs clear to auscultation	  Gastrointestinal:  Soft, Non-tender, + BS	  Extremities: Normal range of motion, No clubbing, cyanosis or edema                                    12.1   7.63  )-----------( 179      ( 27 Aug 2018 06:17 )             37.3     08-27    138  |  107  |  8   ----------------------------<  98  3.6   |  21<L>  |  1.36<H>    Ca    8.5      27 Aug 2018 06:17  Phos  2.9     08-27  Mg     1.9     08-27      proBNP:   Lipid Profile:   HgA1c:   TSH:

## 2018-08-27 NOTE — PROGRESS NOTE ADULT - GASTROINTESTINAL DETAILS
no guarding/nontender/no distention/no rigidity/soft
soft/nontender/no rebound tenderness/no rigidity/no distention/no guarding
no rigidity/nontender/no distention/no guarding/soft

## 2018-08-27 NOTE — PROGRESS NOTE ADULT - RS GEN PE MLT RESP DETAILS PC
respirations non-labored/clear to auscultation bilaterally
respirations non-labored/clear to auscultation bilaterally
rales/respirations non-labored

## 2018-08-27 NOTE — PROGRESS NOTE ADULT - PROBLEM SELECTOR PLAN 1
-cont zosyn  -repeat bcx negative  -urine cx negative  -CT abd/pelvis - no obvious source  -no further workup as pt planned for hospice per primary team

## 2018-08-27 NOTE — PROGRESS NOTE ADULT - PROBLEM SELECTOR PLAN 1
ERIKA likely hemodynamically mediated in setting of hypotension and hypercalcemia.   Renal function had improved to baseline. encourage PO intake.   Pt s/p CT with contrast 8/23. Monitor BMP closely  continue to monitor BMP. strict I/O   avoid nephrotoxics, NSAIDS RCA

## 2018-08-28 VITALS — HEART RATE: 80 BPM | OXYGEN SATURATION: 94 %

## 2018-08-28 LAB
GLUCOSE BLDC GLUCOMTR-MCNC: 119 MG/DL — HIGH (ref 70–99)
GLUCOSE BLDC GLUCOMTR-MCNC: 162 MG/DL — HIGH (ref 70–99)

## 2018-08-28 RX ORDER — CIPROFLOXACIN LACTATE 400MG/40ML
1 VIAL (ML) INTRAVENOUS
Qty: 7 | Refills: 0 | OUTPATIENT
Start: 2018-08-28 | End: 2018-09-03

## 2018-08-28 RX ORDER — ACETAMINOPHEN 500 MG
1 TABLET ORAL
Qty: 0 | Refills: 0 | COMMUNITY
Start: 2018-08-28

## 2018-08-28 RX ADMIN — Medication 1: at 11:58

## 2018-08-28 NOTE — PROGRESS NOTE ADULT - PROBLEM SELECTOR PLAN 1
ERIKA likely hemodynamically mediated in setting of hypotension and hypercalcemia.   Renal function had improved to baseline. encourage PO intake.   Pt s/p CT with contrast 8/23. Monitor BMP closely  continue to monitor BMP. strict I/O   avoid nephrotoxics, NSAIDS RCA ERIKA likely hemodynamically mediated in setting of hypotension and hypercalcemia.   Renal function improved.  Encourage PO intake.   Pt s/p CT with contrast 8/23. Monitor BMP closely  continue to monitor BMP. strict I/O   avoid nephrotoxics, NSAIDS RCA

## 2018-08-28 NOTE — PROGRESS NOTE ADULT - PROBLEM SELECTOR PROBLEM 3
Fever
DM (diabetes mellitus)
Debility
Debility
Hypercalcemia
Fever
Fever

## 2018-08-28 NOTE — PROGRESS NOTE ADULT - PROBLEM SELECTOR PLAN 4
in setting of RF and Lactic acidosis  now improved, d/c bicarb gtt  monitor serum co2. in setting of RF and Lactic acidosis  now improved,  monitor serum co2.

## 2018-08-28 NOTE — PROGRESS NOTE ADULT - SUBJECTIVE AND OBJECTIVE BOX
[facility] [place] [facility]   MD RUDDY BACH MD ANGELA WONG, PA    TEL:  OFFICE: [phone]  DR FRANCES CELL: [phone]  DR. VILLALTA CELL: [phone]  SHAYNA BORREGO CELL: [phone]        Patient is a 88y old  Male who presents with a chief complaint of SOB (26 Aug 2018 14:27)      Patient seen and examined at bedside. No chest pain/sob    VITALS:  T(F): 98.7 (08-28-18 @ 06:38), Max: 98.7 (08-28-18 @ 06:38)  HR: 78 (08-28-18 @ 07:41)  BP: 124/66 (08-28-18 @ 06:38)  RR: 18 (08-28-18 @ 06:38)  SpO2: 95% (08-28-18 @ 07:41)  Wt(kg): --        PHYSICAL EXAM:  Constitutional: NAD  Neck: No JVD  Respiratory: CTAB, no wheezes, rales or rhonchi  Cardiovascular: S1, S2, RRR  Gastrointestinal: BS+, soft, NT/ND  Extremities: No peripheral edema    Hospital Medications:   MEDICATIONS  (STANDING):  ciprofloxacin     Tablet 500 milliGRAM(s) Oral daily  dextrose 5%. 1000 milliLiter(s) (50 mL/Hr) IV Continuous <Continuous>  dextrose 50% Injectable 12.5 Gram(s) IV Push once  dextrose 50% Injectable 25 Gram(s) IV Push once  dextrose 50% Injectable 25 Gram(s) IV Push once  insulin lispro (HumaLOG) corrective regimen sliding scale   SubCutaneous three times a day before meals  insulin lispro (HumaLOG) corrective regimen sliding scale   SubCutaneous at bedtime      LABS:  08-27    138  |  107  |  8   ----------------------------<  98  3.6   |  21<L>  |  1.36<H>    Ca    8.5      27 Aug 2018 06:17  Phos  2.9     08-27  Mg     1.9     08-27      Creatinine Trend: 1.36 <--, 1.18 <--, 1.22 <--, 1.19 <--, 1.46 <--, 1.61 <--                                12.1   7.63  )-----------( 179      ( 27 Aug 2018 06:17 )             37.3     Urine Studies:  Urinalysis - [08-20-18 @ 15:10]      Color YELLOW / Appearance HAZY / SG 1.021 / pH 5.5      Gluc TRACE / Ketone NEGATIVE  / Bili SMALL / Urobili 1       Blood NEGATIVE / Protein 30 / Leuk Est NEGATIVE / Nitrite NEGATIVE      RBC 3-5 / WBC 2-5 / Hyaline  / Gran  / Sq Epi OCC / Non Sq Epi  / Bacteria FEW      PTH 10.35 (Ca --)      [08-22-18 @ 06:04]   --  PTH 5.92 (Ca --)      [06-01-18 @ 06:10]   --  Vitamin D (25OH) 42.8      [06-01-18 @ 06:10]  HbA1c 7.3      [08-21-18 @ 06:00]  TSH 1.18      [06-04-18 @ 05:20]        RADIOLOGY & ADDITIONAL STUDIES: [facility] [place] [facility]   MD RUDDY BACH MD ANGELA WONG, PA    TEL:  OFFICE: [phone]  DR FRANCES CELL: [phone]  DR. VILLALTA CELL: [phone]  SHAYNA BORREGO CELL: [phone]        Patient is a 88y old  Male who presents with a chief complaint of SOB       Patient seen and examined at bedside. No chest pain/sob    VITALS:  T(F): 98.7 (08-28-18 @ 06:38), Max: 98.7 (08-28-18 @ 06:38)  HR: 78 (08-28-18 @ 07:41)  BP: 124/66 (08-28-18 @ 06:38)  RR: 18 (08-28-18 @ 06:38)  SpO2: 95% (08-28-18 @ 07:41)  Wt(kg): --        PHYSICAL EXAM:  Constitutional: NAD  Neck: No JVD  Respiratory: CTAB, no wheezes, rales or rhonchi  Cardiovascular: S1, S2, RRR  Gastrointestinal: BS+, soft, NT/ND  Extremities: No peripheral edema    Hospital Medications:   MEDICATIONS  (STANDING):  ciprofloxacin     Tablet 500 milliGRAM(s) Oral daily  dextrose 5%. 1000 milliLiter(s) (50 mL/Hr) IV Continuous <Continuous>  dextrose 50% Injectable 12.5 Gram(s) IV Push once  dextrose 50% Injectable 25 Gram(s) IV Push once  dextrose 50% Injectable 25 Gram(s) IV Push once  insulin lispro (HumaLOG) corrective regimen sliding scale   SubCutaneous three times a day before meals  insulin lispro (HumaLOG) corrective regimen sliding scale   SubCutaneous at bedtime      LABS:  08-27    138  |  107  |  8   ----------------------------<  98  3.6   |  21<L>  |  1.36<H>    Ca    8.5      27 Aug 2018 06:17  Phos  2.9     08-27  Mg     1.9     08-27      Creatinine Trend: 1.36 <--, 1.18 <--, 1.22 <--, 1.19 <--, 1.46 <--, 1.61 <--                                12.1   7.63  )-----------( 179      ( 27 Aug 2018 06:17 )             37.3     Urine Studies:  Urinalysis - [08-20-18 @ 15:10]      Color YELLOW / Appearance HAZY / SG 1.021 / pH 5.5      Gluc TRACE / Ketone NEGATIVE  / Bili SMALL / Urobili 1       Blood NEGATIVE / Protein 30 / Leuk Est NEGATIVE / Nitrite NEGATIVE      RBC 3-5 / WBC 2-5 / Hyaline  / Gran  / Sq Epi OCC / Non Sq Epi  / Bacteria FEW      PTH 10.35 (Ca --)      [08-22-18 @ 06:04]   --  PTH 5.92 (Ca --)      [06-01-18 @ 06:10]   --  Vitamin D (25OH) 42.8      [06-01-18 @ 06:10]  HbA1c 7.3      [08-21-18 @ 06:00]  TSH 1.18      [06-04-18 @ 05:20]        RADIOLOGY & ADDITIONAL STUDIES:

## 2018-08-28 NOTE — PROGRESS NOTE ADULT - PROBLEM SELECTOR PLAN 3
-NPO   -iss
PPSV2 is 30%.  Assist with all ADLS.
PPSV2 is 30%.  Assist with all ADLS.
hx of multiple myeloma   PTH low,   improving with IVF  hema/onc eval  palliative on board.
hx of multiple myeloma   PTH low,   palliative on board.
iss  start pt on dysphagia diet with strict aspiration precautions
hx of multiple myeloma   check PTH, vit D  improving with IVF  hema/onc eval  palliative on board.
-better

## 2018-08-28 NOTE — PROGRESS NOTE ADULT - ASSESSMENT
1.	ALI, improved.  2.	CKD.  3.	Hypernatremia, improved.  4.	Hypercalcemia with high KL ratio, improved.  5.	Metabolic Acidosis.    PLAN:  1.	Reduce IVF.  2.	Follow up BMP.  3.	Encourage oral fluids.
1.	ERIKA, improved.  2.	Hypernatremia with signs of volume depletion.  3.	Sepsis.    PLAN:  1.	Change IV to D5W.  2.	Follow up BMP.  3.	encourage PO fluid.
88 year old male with Multiple Myeloma,  Tachypnea, debility, encounter for palliative care.
88 year old male with Multiple Myeloma,  Tachypnea, debility, encounter for palliative care.
88 year old man with DM, HTN, MM, Alzheimer's CAD s/p bypass c/b afib p/w cough, fever. possible aspiration PNA, found to have ERIKA and hypercalcemia
Pt is a 87 yo M w/ hx alzehemers dementia oriented x 1 at baseline, MM on home hospice p/w fever tachypnea and hypotension after a possible aspiration event s/p 4 L NS +ERIKA cr 1.09 (6/2018)-->2.08 WBC <4 RR 24 bandemia-22%. UA neg CXR-no acute infiltrate
Pt is a 89 yo M w/ hx alzehemers dementia oriented x 1 at baseline, MM on home hospice p/w fever tachypnea and hypotension after a possible aspiration event s/p 4 L NS +ERIKA cr 1.09 (6/2018)-->2.08 WBC <4 RR 24 bandemia-22%. UA neg CXR-no acute infiltrate
ekg - svt @ 159 BPM     Echo 2012 - normal LV function     a/p     1) SVT - now in NSR, likely sec to aspiration pneumonia,     2) Aspiration pneumonia - on vanco and zosyn    3) CAD s/p CABG - home hospice not on ASA, no CP EKG shows no sign of ischemia
ekg - svt @ 159 BPM     Echo 2012 - normal LV function     a/p     1) SVT - now in NSR, likely sec to aspiration pneumonia, c/w lopressor 25mg q12    2) Aspiration pneumonia - on vanco and zosyn    3) CAD s/p CABG - home hospice not on ASA, no CP EKG shows no sign of ischemia
ekg - svt @ 159 BPM     Echo 2012 - normal LV function     a/p     1) SVT - now in NSR, likely sec to aspiration pneumonia, c/w lopressor 25mg q12    2) Aspiration pneumonia - on vanco and zosyn    3) CAD s/p CABG - home hospice not on ASA, no CP EKG shows no sign of ischemia
ekg - svt @ 159 BPM     Echo 2012 - normal LV function     a/p     1) SVT - now in NSR, likely sec to aspiration pneumonia, start lopressor 25mg q12    2) Aspiration pneumonia - on vanco and zosyn    3) CAD s/p CABG - home hospice not on ASA, no CP EKG shows no sign of ischemia
ekg - svt @ 159 BPM     Echo 2012 - normal LV function     a/p     1) SVT - now in NSR, likely sec to aspiration pneumonia, will resume bblockers when bp ok     2) Aspiration pneumonia - on vanco and zosyn    3) CAD s/p CABG - home hospice not on ASA, no CP EKG shows no sign of ischemia
ekg - svt @ 159 BPM     Echo 2012 - normal LV function     a/p     1) SVT - now in NSR, likely sec to aspiration pneumonia, will resume bblockers when bp ok     2) Aspiration pneumonia - on vanco and zosyn    3) CAD s/p CABG - home hospice not on ASA, no CP EKG shows no sign of ischemia
87 yo M w/ hx alzheimer's Dementia at baseline oriented to person only, HTN, DM, MM (not on treatment-on home hospice) p/w SOB with sepsis, fever, bandemia, aspiration, GNR bacteremia
88 year old man with DM, HTN, MM, Alzheimer's CAD s/p bypass c/b afib p/w cough, fever. possible aspiration PNA, found to have ERIKA and hypercalcemia
87 yo M w/ hx alzheimer's Dementia at baseline oriented to person only, HTN, DM, MM (not on treatment-on home hospice) p/w SOB with sepsis, fever, bandemia, aspiration, GNR bacteremia
87 yo M w/ hx alzheimer's Dementia at baseline oriented to person only, HTN, DM, MM (not on treatment-on home hospice) p/w SOB with sepsis, fever, bandemia, aspiration, GNR bacteremia

## 2018-08-28 NOTE — PROGRESS NOTE ADULT - PROBLEM SELECTOR PROBLEM 2
ERIKA (acute kidney injury)
Stage 3 chronic kidney disease
Tachypnea
Tachypnea
Bacteremia due to Escherichia coli
Stage 3 chronic kidney disease
Bacteremia due to Escherichia coli
Bacteremia due to Escherichia coli

## 2018-08-28 NOTE — PROGRESS NOTE ADULT - PROBLEM SELECTOR PROBLEM 1
ERIKA (acute kidney injury)
Multiple myeloma
Multiple myeloma
Sepsis, due to unspecified organism
ERIKA (acute kidney injury)
Sepsis due to Escherichia coli

## 2018-08-28 NOTE — PROGRESS NOTE ADULT - PROBLEM SELECTOR PLAN 2
baseline ~ 1.2-1.3  likely sec to HTN/DM  monitor bmp. baseline ~ 1.2-1.3  likely sec to HTN/DM  monitor bmp.  avoid nephrotoxics, NSAIDS RCA

## 2018-08-28 NOTE — PROGRESS NOTE ADULT - PROVIDER SPECIALTY LIST ADULT
Cardiology
Infectious Disease
Infectious Disease
Internal Medicine
Nephrology
Palliative Care
Palliative Care
Nephrology
Infectious Disease

## 2018-08-30 LAB
BACTERIA BLD CULT: SIGNIFICANT CHANGE UP
BACTERIA BLD CULT: SIGNIFICANT CHANGE UP

## 2018-08-31 LAB
BACTERIA BLD CULT: SIGNIFICANT CHANGE UP
BACTERIA BLD CULT: SIGNIFICANT CHANGE UP

## 2018-09-24 NOTE — PROGRESS NOTE ADULT - PROBLEM SELECTOR PLAN 6
[facility] Medicine                                   History & Physical    Patient Name: Sheryl Martines  MRN: [id number]  Patient Class: OP- Observation   Admission Date: 9/23/2018  Length of Stay: 0 days  Attending Physician: Servando García, *  Primary Care Provider: [facility] Medicine Team: [facility] [place]    SUBJECTIVE:     Chief Complaint/Reason for Admission: Altered mental status    History of Present Illness:  Ms. Martines is a 63-year-old female with recent appendicitis s/p appendectomy, COPD, IDDM, CAD, pontine stroke (2012) presents from SNF for intermittent altered mental status (per SNF) and lethargy of 2 days. Patient and daughter denies AMS and is attributing drowsiness to pain medications.   She also c/o loose stool, dizziness, dysuira. She denied fever, chills, headache, chest pain, SOB, nausea, and vomiting.    Patient currently living in rehab facility for reconditioning and treatment of wound infection s/p appendectomy on 8/26. On previous admission, she presented with slurred speech and AMS. EEG was done at the time and did not show any seizure activity. She found to have appendicitis with peritonitis, and underwent open appendectomy. Her abdominal wound cultures positive for  MRSA, enterococcus, & e. coli.  Also positive for c. diff during admission. She was discharged with PO vanc (stop Sept 13), clinda (1 week), and flagyl (stop Sept 13) per ID/ surgery.      At SNF, she was on Ertapenem (started 9/20) and Vancomycin (started 9/21) until this transfered to ED for present admission.    Interval History: Patient is agitated and refused to answer questions. Per nurses, patient is constantly yelling, pushing the call button, and have required constant attention. Last night she was in NSR however around 4am she had some PVCs and HR in to the 190s for 3 seconds and new onset Afib.     Review of Systems:  Review of Systems    Constitutional: Positive for activity change and fatigue. Negative for appetite change, chills, diaphoresis and fever.   HENT: Positive for trouble swallowing (pills). Negative for facial swelling and sore throat.    Eyes: Negative for photophobia, pain and visual disturbance.   Respiratory: Negative for cough, chest tightness, shortness of breath and wheezing.    Cardiovascular: Negative for chest pain, palpitations and leg swelling.   Gastrointestinal: Positive for abdominal pain and diarrhea. Negative for blood in stool, constipation, nausea and vomiting.   Genitourinary: Positive for dysuria. Negative for difficulty urinating and flank pain.   Musculoskeletal: Positive for arthralgias, back pain and gait problem. Negative for neck pain and neck stiffness.   Skin: Positive for wound (Surgical scar, RLQ).   Neurological: Positive for dizziness and weakness. Negative for syncope, numbness and headaches.   Hematological: Bruises/bleeds easily.   Psychiatric/Behavioral: Positive for confusion. Negative for hallucinations. The patient is not nervous/anxious.        Past Medical History:   Diagnosis Date    Asthma     Closed compression fracture of fourth lumbar vertebra     COPD (chronic obstructive pulmonary disease)     Coronary artery disease     Diabetes mellitus     Glaucoma     High cholesterol     Hypertension     Iritis     Pulmonary embolus      Pontine Stroke     rt sided weakness.    Neuropathy       Past Surgical History:   Procedure Laterality Date    ABDOMINAL SURGERY      APPENDECTOMY N/A 8/26/2018    Procedure: APPENDECTOMY;  Surgeon: Bernadine Melendrez MD;  Location: [facility] OR;  Service: General;  Laterality: N/A;    APPENDECTOMY N/A 8/26/2018    Performed by Bernadine Melendrez MD at [facility] OR    APPENDECTOMY, LAPAROSCOPIC---CONVERTED TO OPEN APPENDECTOMY @0950 N/A 8/26/2018    Performed by Bernadine Melendrez MD at [facility] OR    BACK SURGERY      stimulator    CATARACT EXTRACTION       HYSTERECTOMY      LAPAROSCOPIC APPENDECTOMY N/A 8/26/2018    Procedure: APPENDECTOMY, LAPAROSCOPIC---CONVERTED TO OPEN APPENDECTOMY @0950;  Surgeon: Bernadine Melendrez MD;  Location: [facility];  Service: General;  Laterality: N/A;       Review of patient's allergies indicates:   Allergen Reactions    Ace inhibitors Swelling    Corticosteroids (glucocorticoids)     Hydralazine analogues     Tetracyclines Swelling    Travatan (with benzalkonium) [travoprost (benzalkonium)]        Prior to Admission medications    Medication Sig Start Date End Date Taking? Authorizing Provider   albuterol-ipratropium (DUO-NEB) 2.5 mg-0.5 mg/3 mL nebulizer solution Take 3 mLs by nebulization every 4 (four) hours. Rescue   Yes Historical Provider, MD   aspirin (ECOTRIN) 81 MG EC tablet Take 1 tablet (81 mg total) by mouth once daily. 9/5/18 9/5/19 Yes Julian Carmichael MD   clopidogrel (PLAVIX) 75 mg tablet Take 75 mg by mouth once daily.   Yes Historical Provider, MD   diltiaZEM (CARDIZEM CD) 180 MG 24 hr capsule Take 180 mg by mouth once daily.   Yes Historical Provider, MD   DULoxetine (CYMBALTA) 60 MG capsule Take 60 mg by mouth once daily.   Yes Historical Provider, MD   enoxaparin (LOVENOX) 40 mg/0.4 mL Syrg Inject 40 mg into the skin once daily.   Yes Historical Provider, MD   furosemide (LASIX) 40 MG tablet Take 40 mg by mouth once daily.    Yes Historical Provider, MD   insulin aspart U-100 (NOVOLOG) 100 unit/mL injection Inject 0-5 Units into the skin 4 (four) times daily as needed for High Blood Sugar.   Yes Historical Provider, MD   insulin detemir U-100 (LEVEMIR) 100 unit/mL injection Inject 10 Units into the skin 2 (two) times daily.    Yes Historical Provider, MD   Lactobacillus acidophilus 1 billion cell Cap Take 1 tablet by mouth 2 (two) times daily.   Yes Historical Provider, MD   losartan (COZAAR) 100 MG tablet Take 100 mg by mouth once daily.    Yes Historical Provider, MD   megestrol (MEGACE) 40 MG Tab  hypotensive , improving  monitor closely. Take 40 mg by mouth 2 (two) times daily.   Yes Historical Provider, MD   ondansetron (ZOFRAN) 4 MG tablet Take 1 tablet (4 mg total) by mouth every 6 (six) hours as needed. 9/4/18  Yes Julian Carmichael MD   pantoprazole (PROTONIX) 40 MG tablet Take 40 mg by mouth once daily.   Yes Historical Provider, MD   prazosin (MINIPRESS) 5 MG capsule Take 5 mg by mouth 2 (two) times daily.    Yes Historical Provider, MD   predniSONE (DELTASONE) 10 MG tablet Take 10 mg by mouth 2 (two) times daily.    Yes Historical Provider, MD   senna-docusate 8.6-50 mg (SENNA WITH DOCUSATE SODIUM) 8.6-50 mg per tablet Take 1 tablet by mouth 2 (two) times daily.   Yes Historical Provider, MD   simethicone 80 mg Tab Take 160 mg by mouth every 6 (six) hours as needed for Flatulence.   Yes Historical Provider, MD   simvastatin (ZOCOR) 40 MG tablet Take 40 mg by mouth every evening.   Yes Historical Provider, MD   theophylline (THEODUR) 200 MG 12 hr tablet Take 200 mg by mouth once daily.    Yes Historical Provider, MD       Family History   Problem Relation Age of Onset    Cancer Father     Diabetes Brother     MS Mother     SLE Sister        Social History     Tobacco Use    Smoking status: Never Smoker   Substance Use Topics    Alcohol use: No     Frequency: Never    Drug use: No            OBJECTIVE:     Vital Signs (Most Recent):  Temp: 96.2 °F (35.7 °C) (09/24/18 0817)  Pulse: 81 (09/24/18 0657)  Resp: (!) 24 (09/24/18 0433)  BP: (!) 167/92 (09/24/18 0817)  SpO2: (!) 90 % (09/24/18 0657) Vital Signs (24h Range):  Temp:  [96.2 °F (35.7 °C)-97.7 °F (36.5 °C)] 96.2 °F (35.7 °C)  Pulse:  [] 81  Resp:  [20-24] 24  SpO2:  [90 %-100 %] 90 %  BP: (132-187)/() 167/92     Wt Readings from Last 1 Encounters:   09/24/18 0200 74.6 kg (164 lb 6.4 oz)   09/23/18 1159 73 kg (161 lb)       Physical Exam   Constitutional: She appears lethargic. She is uncooperative.   Eyes: Lids are normal.   Patient refused to open eyes   Neck:  Trachea normal. Muscular tenderness present. No edema present.   Cardiovascular: S1 normal, S2 normal and normal heart sounds.   Pulmonary/Chest: Breath sounds normal. No respiratory distress. She has no wheezes. She has no rales.   Abdominal: Bowel sounds are normal. She exhibits distension. She exhibits no mass. There is generalized tenderness. There is guarding. There is no rebound.   Neurological: She appears lethargic.   Skin: Skin is warm and dry.   Psychiatric: Her affect is angry. Her speech is rapid and/or pressured and slurred. She is agitated and aggressive.   Nursing note and vitals reviewed.      Laboratory:  Recent Labs   Lab  09/23/18   1303   COLORU  Yellow   SPECGRAV  1.010   PHUR  7.0   PROTEINUA  Negative   BACTERIA  Occasional   NITRITE  Negative   LEUKOCYTESUR  Trace*   UROBILINOGEN  Negative   HYALINECASTS  6*       Recent Results (from the past 24 hour(s))   Blood culture x two cultures. Draw prior to antibiotics.    Collection Time: 09/23/18 12:50 PM   Result Value Ref Range    Blood Culture, Routine No Growth to date    CBC auto differential    Collection Time: 09/23/18 12:50 PM   Result Value Ref Range    WBC 9.13 3.90 - 12.70 K/uL    RBC 4.54 4.00 - 5.40 M/uL    Hemoglobin 12.0 12.0 - 16.0 g/dL    Hematocrit 39.3 37.0 - 48.5 %    MCV 87 82 - 98 fL    MCH 26.4 (L) 27.0 - 31.0 pg    MCHC 30.5 (L) 32.0 - 36.0 g/dL    RDW 19.2 (H) 11.5 - 14.5 %    Platelets 228 150 - 350 K/uL    MPV 9.6 9.2 - 12.9 fL    Immature Granulocytes 0.3 0.0 - 0.5 %    Gran # (ANC) 6.8 1.8 - 7.7 K/uL    Immature Grans (Abs) 0.03 0.00 - 0.04 K/uL    Lymph # 1.6 1.0 - 4.8 K/uL    Mono # 0.7 0.3 - 1.0 K/uL    Eos # 0.0 0.0 - 0.5 K/uL    Baso # 0.01 0.00 - 0.20 K/uL    nRBC 0 0 /100 WBC    Gran% 74.5 (H) 38.0 - 73.0 %    Lymph% 17.1 (L) 18.0 - 48.0 %    Mono% 7.6 4.0 - 15.0 %    Eosinophil% 0.4 0.0 - 8.0 %    Basophil% 0.1 0.0 - 1.9 %    Differential Method Automated    Comprehensive metabolic panel    Collection Time:  09/23/18 12:50 PM   Result Value Ref Range    Sodium 140 136 - 145 mmol/L    Potassium 3.7 3.5 - 5.1 mmol/L    Chloride 102 95 - 110 mmol/L    CO2 30 (H) 23 - 29 mmol/L    Glucose 163 (H) 70 - 110 mg/dL    BUN, Bld 18 8 - 23 mg/dL    Creatinine 1.4 0.5 - 1.4 mg/dL    Calcium 9.4 8.7 - 10.5 mg/dL    Total Protein 6.4 6.0 - 8.4 g/dL    Albumin 2.9 (L) 3.5 - 5.2 g/dL    Total Bilirubin 0.5 0.1 - 1.0 mg/dL    Alkaline Phosphatase 103 55 - 135 U/L    AST 26 10 - 40 U/L    ALT 24 10 - 44 U/L    Anion Gap 8 8 - 16 mmol/L    eGFR if African American 46.1 (A) >60 mL/min/1.73 m^2    eGFR if non African American 40.0 (A) >60 mL/min/1.73 m^2   Lactic acid, plasma #1    Collection Time: 09/23/18 12:50 PM   Result Value Ref Range    Lactate (Lactic Acid) 1.7 0.5 - 2.2 mmol/L   ISTAT PROCEDURE    Collection Time: 09/23/18 12:51 PM   Result Value Ref Range    POC PH 7.517 (H) 7.35 - 7.45    POC PCO2 43.2 35 - 45 mmHg    POC PO2 52 40 - 60 mmHg    POC HCO3 35.1 (H) 24 - 28 mmol/L    POC BE 12 -2 to 2 mmol/L    POC SATURATED O2 90 (L) 95 - 100 %    POC Lactate 1.32 0.5 - 2.2 mmol/L    POC TCO2 36 (H) 24 - 29 mmol/L    Sample VENOUS     Site Other     Allens Test N/A     DelSys Room Air     Mode SPONT    Urinalysis, Reflex to Urine Culture Urine, Clean Catch    Collection Time: 09/23/18  1:03 PM   Result Value Ref Range    Specimen UA Urine, Clean Catch     Color, UA Yellow Yellow, Straw, Amanda    Appearance, UA Clear Clear    pH, UA 7.0 5.0 - 8.0    Specific Gravity, UA 1.010 1.005 - 1.030    Protein, UA Negative Negative    Glucose, UA Negative Negative    Ketones, UA Negative Negative    Bilirubin (UA) Negative Negative    Occult Blood UA Negative Negative    Nitrite, UA Negative Negative    Urobilinogen, UA Negative <2.0 EU/dL    Leukocytes, UA Trace (A) Negative   Urinalysis Microscopic    Collection Time: 09/23/18  1:03 PM   Result Value Ref Range    RBC, UA 1 0 - 4 /hpf    WBC, UA 2 0 - 5 /hpf    Bacteria, UA Occasional  "None-Occ /hpf    Squam Epithel, UA 3 /hpf    Hyaline Casts, UA 6 (A) 0-1/lpf /lpf    Microscopic Comment SEE COMMENT    Blood culture x two cultures. Draw prior to antibiotics.    Collection Time: 09/23/18  2:07 PM   Result Value Ref Range    Blood Culture, Routine No Growth to date    Lactic acid, plasma #2    Collection Time: 09/23/18  6:04 PM   Result Value Ref Range    Lactate (Lactic Acid) 1.2 0.5 - 2.2 mmol/L   POCT glucose    Collection Time: 09/23/18  9:39 PM   Result Value Ref Range    POCT Glucose 124 (H) 70 - 110 mg/dL   POCT glucose    Collection Time: 09/24/18  8:06 AM   Result Value Ref Range    POCT Glucose 82 70 - 110 mg/dL       Lab Results   Component Value Date    INR 1.1 09/10/2018    INR 0.9 08/23/2018       Lab Results   Component Value Date    HGBA1C 8.1 (H) 08/23/2018       Recent Labs      09/21/18   1609  09/21/18   2014  09/22/18   0655  09/22/18   1116  09/22/18   1753  09/22/18   2042  09/23/18   0709  09/23/18   2139   POCTGLUCOSE  78  219*  108  104  122*  137*  134*  124*       Diagnostic Results:  {Results; Diagnostics:[id number]::"***"}    Imaging Results          CT Abdomen Pelvis With Contrast (Final result)     Abnormal  Result time 09/23/18 16:46:21    Final result by Erma Munroe MD (09/23/18 16:46:21)                 Impression:      Interval resolution of wound dehiscence involving the right lateral abdominal wall.    Interval improvement of inflammatory changes adjacent to the area of recent appendectomy.  Small volume residual free fluid along the right pericolic gutter and pelvis.    Decrease in size of the ill-defined soft tissue collection within the right hemipelvis measuring 5.7 x 3.3 cm, previously 7.1 x 3.3 cm.  Finding likely represents resolving hematoma.    Multiple compression fractures within the lower thoracic and lumbar spine, unchanged.    Colonic diverticulosis without diverticulitis.    This report was flagged in Epic as abnormal.    Electronically signed " by resident: Ronni López  Date:    09/23/2018  Time:    16:08    Electronically signed by: Erma Munroe  Date:    09/23/2018  Time:    16:46             Narrative:    EXAMINATION:  CT ABDOMEN PELVIS WITH CONTRAST    CLINICAL HISTORY:  recent appy; diffuse abd pain;    TECHNIQUE:  Low dose axial images, sagittal and coronal reformations were obtained from the lung bases to the pubic symphysis following the IV administration of  mL of Omnipaque 350 .  Oral contrast was not administered..    COMPARISON:  CT abdomen pelvis 09/10/2018, 08/31/2018, 08/25/2018    FINDINGS:  Exam mildly limited secondary to beam hardening artifact created by patient's arms in the scanner as well as a left hip prosthesis.    ABDOMEN:    - Heart base: Normal in size without pericardial effusion.    - Lung bases: Well aerated bilaterally without significant consolidation, pleural effusion, pneumothorax, or mass.    - Liver: Normal in size without focal abnormality.  The portal, splenic and superior mesenteric veins are patent.    - Gallbladder: Decompressed without significant abnormality.    - Bile Ducts: No evidence of intra or extra hepatic biliary ductal dilation.    - Spleen: Negative.    - Kidneys: Normal in size and location without hydronephrosis.  There is a stable the 1.3 cm hypodensity within the inferior pole of the right kidney likely representing a renal cyst.    - Adrenals: Bilateral adrenal glands unremarkable.    - Pancreas: No mass or peripancreatic fat stranding.    - Vascular: No abdominal aortic aneurysm.    - Abdominal wall:  Postsurgical changes about the right lower abdominal wall status post appendectomy.  No organized fluid collection or evidence of wound dehiscence.  Small fat containing ventral hernia.    No enlarged retroperitoneal or mesenteric lymph nodes.    PELVIS:    Limited evaluation through the pelvis secondary to beam hardening artifact from hip arthroplasty.  Interval decrease in size of the high  attenuation collection within the right pelvic sidewall measuring 5.7 x 3.3 cm, decreased from 7.1 x 3.3 cm.  Small volume of free fluid tracking along the right pericolic gutter and draining into the pelvis, similar to prior exam.  Bladder appears unremarkable accounting for limitations secondary to beam hardening artifact.    BOWEL/MESENTERY:    Improvement in inflammatory changes about the area of prior appendectomy with some residual mesenteric fat stranding remaining.  No evidence of high-grade bowel obstruction.  Fluid filled colon.  Scattered colonic diverticula.  No free fluid or free air.    BONES:  Postoperative change status post left hip arthroplasty and spinal stimulator placement.  Stable appearance of multiple compression fractures of within the lumbar and lower thoracic spine.  No new acute fracture or aggressive osseous lesion.                               X-Ray Chest AP Portable (Final result)  Result time 09/23/18 13:10:33    Final result by Miah De La Rosa MD (09/23/18 13:10:33)                 Impression:      1. No acute cardiopulmonary process, stable chronic findings.      Electronically signed by: Miah De La Rosa MD  Date:    09/23/2018  Time:    13:10             Narrative:    EXAMINATION:  XR CHEST AP PORTABLE    CLINICAL HISTORY:  Sepsis;    TECHNIQUE:  Single frontal view of the chest was performed.    COMPARISON:  09/12/2018    FINDINGS:  Right chest wall port catheter tip projects over the distal SVC, stable in configuration.  Stable stimulator device.  The cardiomediastinal silhouette is prominent, stable as compared to the previous exam..  There is no pleural effusion.  The trachea is midline.  The lungs are symmetrically expanded bilaterally without evidence of acute parenchymal process. No large focal consolidation seen.  There is mild left basilar subsegmental atelectasis.  There is no pneumothorax.  The osseous structures are remarkable for degenerative changes..                                 ASSESSMENT/PLAN:     Primary Diagnosis:  Altered mental status    Active Hospital Problems    Diagnosis  POA    *Altered mental status [R41.82]  Yes    Essential hypertension [I10]  Yes    Gastroesophageal reflux disease without esophagitis [K21.9]  Yes    Debilitated patient [R53.81]  Yes    S/P appendectomy [Z90.49]  Not Applicable    Moderate asthma without complication [J45.909]  Yes    (HFpEF) heart failure with preserved ejection fraction [I50.30]  Unknown    Clostridium difficile infection [B96.89]  Yes    NIKHIL (acute kidney injury) [N17.9]  Unknown    Insulin dependent diabetes mellitus [E11.9, Z79.4]  Not Applicable    CAD (coronary artery disease) [I25.10]  Yes    Closed compression fracture of fourth lumbar vertebra [S32.040A]  Yes      Resolved Hospital Problems   No resolved problems to display.         NIKHIL (acute kidney injury)  Cr 1.4 on admit. Baseline 0.8.     - Will give IVFs   - Monitor BMP    Altered mental status  Pt presents from SNF with AMS and abdomen exquisitely tender to palpation (although pt denies AMS and abdominal pain during review of systems). DDx include infection, dehydration, medication induced, & delirium.   - CT head showed no acute path    Plan:  - Started Vanc and Zosyn, pending BCx results  - Pt given IVFs: NS 30 ml/kg (2190 cc)  - Holding Cymbalta  - Avoid opioids for now  - Delirium precautions    S/P appendectomy  Pt recently hospitalized for appendicitis with peritonitis. She had open appendectomy 8/26 with wound cultures positive for MRSA, enterococcus, and E.col. She was discharged with PO vanc (stop Sept 13), clinda (1 week), and flagyl (stop Sept 13).   At SNF , she was on Ertapenem (started 9/20) and Vancomycin (started 9/21) until this transfered to ED for present admission  -Gen surg consulted: Recommend against aspirating RLQ fluid collection since patient is afebrile and without leukocytosis  -CT (09/23): R pelvic fluid collection,  5.7 cm in size, decreased from 7.1 cm on 9/10    Plan:  - Broad spec abx coverage     Insulin dependent diabetes mellitus  - 7 U detemir BID  - LDSSI  - Diabetic diet      CAD (coronary artery disease)  - ASA 81  - Plavix 75  - Simvastatin 40       Closed compression fracture of fourth lumbar vertebra  Pt with chronic back pain. S/p spinal stimulator placement    - Avoiding opiates for now because of AMS  - Baclofen PRN for pain    Clostridium difficile infection  Recent hospitalization positive for C. Diff infection. Treated with PO vanc and flagyl.   Pt presently reporting loose, but not watery, stool    - Repeat C. Diff panel      Moderate asthma without complication  - Breo daily  - Theophylline 200 mg daily  -  Albuterol PRN      (HFpEF) heart failure with preserved ejection fraction  ESSENTIAL HYPERTENSION    Last ECHO 8/23/18 showing EF 60-65%, grade 1 diastolic dysfunction     - Lasix 40 mg daily  - Losartan 100 mg daily  - Prazosin 5 mg BID    Debilitated patient  - PT/OT      Gastroesophageal reflux disease without esophagitis  - Pantoprazole 40 mg           VTE Risk Mitigation (From admission, onward)        Ordered     heparin, porcine (PF) 100 unit/mL injection flush 500 Units  As needed (PRN)      09/24/18 0145     heparin (porcine) injection 5,000 Units  Every 8 hours      09/23/18 2100            Gloria [place]  Medical Student

## 2018-11-24 ENCOUNTER — INPATIENT (INPATIENT)
Facility: HOSPITAL | Age: 83
LOS: 3 days | Discharge: HOSPICE | End: 2018-11-28
Attending: INTERNAL MEDICINE | Admitting: INTERNAL MEDICINE
Payer: MEDICARE

## 2018-11-24 VITALS
HEART RATE: 124 BPM | SYSTOLIC BLOOD PRESSURE: 115 MMHG | TEMPERATURE: 101 F | DIASTOLIC BLOOD PRESSURE: 77 MMHG | OXYGEN SATURATION: 93 % | RESPIRATION RATE: 14 BRPM

## 2018-11-24 DIAGNOSIS — Z95.1 PRESENCE OF AORTOCORONARY BYPASS GRAFT: Chronic | ICD-10-CM

## 2018-11-24 LAB
ALBUMIN SERPL ELPH-MCNC: 3.3 G/DL — SIGNIFICANT CHANGE UP (ref 3.3–5)
ALP SERPL-CCNC: 105 U/L — SIGNIFICANT CHANGE UP (ref 40–120)
ALT FLD-CCNC: 41 U/L — SIGNIFICANT CHANGE UP (ref 4–41)
ANISOCYTOSIS BLD QL: SLIGHT — SIGNIFICANT CHANGE UP
AST SERPL-CCNC: 131 U/L — HIGH (ref 4–40)
BASE EXCESS BLDV CALC-SCNC: -4.8 MMOL/L — SIGNIFICANT CHANGE UP
BASOPHILS # BLD AUTO: 0.04 K/UL — SIGNIFICANT CHANGE UP (ref 0–0.2)
BASOPHILS NFR BLD AUTO: 0.4 % — SIGNIFICANT CHANGE UP (ref 0–2)
BASOPHILS NFR SPEC: 0 % — SIGNIFICANT CHANGE UP (ref 0–2)
BILIRUB SERPL-MCNC: 1.5 MG/DL — HIGH (ref 0.2–1.2)
BLOOD GAS VENOUS - CREATININE: SIGNIFICANT CHANGE UP MG/DL (ref 0.5–1.3)
BUN SERPL-MCNC: 39 MG/DL — HIGH (ref 7–23)
CALCIUM SERPL-MCNC: 12.4 MG/DL — HIGH (ref 8.4–10.5)
CHLORIDE BLDV-SCNC: 110 MMOL/L — HIGH (ref 96–108)
CHLORIDE SERPL-SCNC: 108 MMOL/L — HIGH (ref 98–107)
CO2 SERPL-SCNC: 18 MMOL/L — LOW (ref 22–31)
CREAT SERPL-MCNC: 2.52 MG/DL — HIGH (ref 0.5–1.3)
EOSINOPHIL # BLD AUTO: 0.02 K/UL — SIGNIFICANT CHANGE UP (ref 0–0.5)
EOSINOPHIL NFR BLD AUTO: 0.2 % — SIGNIFICANT CHANGE UP (ref 0–6)
EOSINOPHIL NFR FLD: 0 % — SIGNIFICANT CHANGE UP (ref 0–6)
GAS PNL BLDV: 119 MMOL/L — CRITICAL LOW (ref 136–146)
GLUCOSE BLDV-MCNC: 160 — HIGH (ref 70–99)
GLUCOSE SERPL-MCNC: 149 MG/DL — HIGH (ref 70–99)
HCO3 BLDV-SCNC: 19 MMOL/L — LOW (ref 20–27)
HCT VFR BLD CALC: 49.7 % — SIGNIFICANT CHANGE UP (ref 39–50)
HCT VFR BLDV CALC: 51.8 % — HIGH (ref 39–51)
HGB BLD-MCNC: 15.8 G/DL — SIGNIFICANT CHANGE UP (ref 13–17)
HGB BLDV-MCNC: 16.9 G/DL — SIGNIFICANT CHANGE UP (ref 13–17)
IMM GRANULOCYTES # BLD AUTO: 0.04 # — SIGNIFICANT CHANGE UP
IMM GRANULOCYTES NFR BLD AUTO: 0.4 % — SIGNIFICANT CHANGE UP (ref 0–1.5)
LACTATE BLDV-MCNC: 4.9 MMOL/L — CRITICAL HIGH (ref 0.5–2)
LYMPHOCYTES # BLD AUTO: 6.41 K/UL — HIGH (ref 1–3.3)
LYMPHOCYTES # BLD AUTO: 71.5 % — HIGH (ref 13–44)
LYMPHOCYTES NFR SPEC AUTO: 82 % — HIGH (ref 13–44)
MAGNESIUM SERPL-MCNC: 2.1 MG/DL — SIGNIFICANT CHANGE UP (ref 1.6–2.6)
MCHC RBC-ENTMCNC: 27.8 PG — SIGNIFICANT CHANGE UP (ref 27–34)
MCHC RBC-ENTMCNC: 31.8 % — LOW (ref 32–36)
MCV RBC AUTO: 87.5 FL — SIGNIFICANT CHANGE UP (ref 80–100)
MICROCYTES BLD QL: SLIGHT — SIGNIFICANT CHANGE UP
MONOCYTES # BLD AUTO: 0.35 K/UL — SIGNIFICANT CHANGE UP (ref 0–0.9)
MONOCYTES NFR BLD AUTO: 3.9 % — SIGNIFICANT CHANGE UP (ref 2–14)
MONOCYTES NFR BLD: 1 % — LOW (ref 2–9)
NEUTROPHIL AB SER-ACNC: 17 % — LOW (ref 43–77)
NEUTROPHILS # BLD AUTO: 2.1 K/UL — SIGNIFICANT CHANGE UP (ref 1.8–7.4)
NEUTROPHILS NFR BLD AUTO: 23.6 % — LOW (ref 43–77)
NRBC # BLD: 0 /100WBC — SIGNIFICANT CHANGE UP
NRBC # FLD: 0.05 — SIGNIFICANT CHANGE UP
OVALOCYTES BLD QL SMEAR: SLIGHT — SIGNIFICANT CHANGE UP
PCO2 BLDV: 45 MMHG — SIGNIFICANT CHANGE UP (ref 41–51)
PH BLDV: 7.29 PH — LOW (ref 7.32–7.43)
PHOSPHATE SERPL-MCNC: SIGNIFICANT CHANGE UP MG/DL (ref 2.5–4.5)
PLATELET # BLD AUTO: 156 K/UL — SIGNIFICANT CHANGE UP (ref 150–400)
PLATELET COUNT - ESTIMATE: NORMAL — SIGNIFICANT CHANGE UP
PMV BLD: 9.6 FL — SIGNIFICANT CHANGE UP (ref 7–13)
PO2 BLDV: 41 MMHG — HIGH (ref 35–40)
POTASSIUM BLDV-SCNC: SIGNIFICANT CHANGE UP MMOL/L (ref 3.4–4.5)
POTASSIUM SERPL-MCNC: SIGNIFICANT CHANGE UP MMOL/L (ref 3.5–5.3)
POTASSIUM SERPL-SCNC: SIGNIFICANT CHANGE UP MMOL/L (ref 3.5–5.3)
PROT SERPL-MCNC: SIGNIFICANT CHANGE UP G/DL (ref 6–8.3)
RBC # BLD: 5.68 M/UL — SIGNIFICANT CHANGE UP (ref 4.2–5.8)
RBC # FLD: 16.3 % — HIGH (ref 10.3–14.5)
SAO2 % BLDV: 74.9 % — SIGNIFICANT CHANGE UP (ref 60–85)
SODIUM SERPL-SCNC: 133 MMOL/L — LOW (ref 135–145)
WBC # BLD: 8.96 K/UL — SIGNIFICANT CHANGE UP (ref 3.8–10.5)
WBC # FLD AUTO: 8.96 K/UL — SIGNIFICANT CHANGE UP (ref 3.8–10.5)

## 2018-11-24 PROCEDURE — 71045 X-RAY EXAM CHEST 1 VIEW: CPT | Mod: 26

## 2018-11-24 RX ORDER — PIPERACILLIN AND TAZOBACTAM 4; .5 G/20ML; G/20ML
3.38 INJECTION, POWDER, LYOPHILIZED, FOR SOLUTION INTRAVENOUS ONCE
Qty: 0 | Refills: 0 | Status: COMPLETED | OUTPATIENT
Start: 2018-11-24 | End: 2018-11-24

## 2018-11-24 RX ORDER — SODIUM CHLORIDE 9 MG/ML
1000 INJECTION INTRAMUSCULAR; INTRAVENOUS; SUBCUTANEOUS ONCE
Qty: 0 | Refills: 0 | Status: COMPLETED | OUTPATIENT
Start: 2018-11-24 | End: 2018-11-24

## 2018-11-24 RX ADMIN — SODIUM CHLORIDE 1000 MILLILITER(S): 9 INJECTION INTRAMUSCULAR; INTRAVENOUS; SUBCUTANEOUS at 22:22

## 2018-11-24 RX ADMIN — PIPERACILLIN AND TAZOBACTAM 200 GRAM(S): 4; .5 INJECTION, POWDER, LYOPHILIZED, FOR SOLUTION INTRAVENOUS at 22:22

## 2018-11-24 NOTE — ED PROVIDER NOTE - CARE PLAN
Principal Discharge DX:	Sepsis  Secondary Diagnosis:	Multiple myeloma Principal Discharge DX:	Sepsis  Goal:	admit  Secondary Diagnosis:	Multiple myeloma

## 2018-11-24 NOTE — ED PROVIDER NOTE - ENMT, MLM
Airway patent, Nasal mucosa clear. Mouth with normal mucosa but very dry. Throat has no vesicles, no oropharyngeal exudates and uvula is midline.

## 2018-11-24 NOTE — ED PROVIDER NOTE - OBJECTIVE STATEMENT
Pt is an 89 y/o M w/ PMHx of Multiple Myeloma on Hospice DNR/I with no aggressive measures to be done except medical therapy such as treatment of infections and transfusions only if would provide comfort who p/w fevers at home. According to daughter at bedside yesterday patient had some vomiting and then during day today has become more lethargic and was febrile. Family also noted a wet cough, but denies any new rashes, no blood in vomit, no diarrhea, no new one sided deficits. Pt normal conversive with family be confused about place and date and time. Pt is an 88 y/o M w/ PMHx of Multiple Myeloma on Hospice DNR/I with no aggressive measures to be done except medical therapy such as treatment of infections and transfusions only if would provide comfort who p/w fevers at home. According to daughter at bedside yesterday patient had some vomiting and then during day today has become more lethargic and was febrile. Family also noted a wet cough, but denies any new rashes, no blood in vomit, no diarrhea, no new one sided deficits. Pt normal conversive with family be confused about place and date and time.  Attending - Agree with above.  I evaluated patient myself.  88 y/o M with 3 daughters at bedside, including 1 who is RN.  Hx dementia, multiple myeloma, on home hospice, DNR/DNI.  Usually able to speak with a bit of coherence.  Increased confusion today.  Also report productive cough x few days.  1 episode of vomiting yesterday.  Decreased PO intake.  No trauma.  Discussed with family plan for CT, xray, abx, fluids, and they agree with plan but prefer no more invasive interventions.  Daughter states has had similar confusion when creatinine increased in past.

## 2018-11-24 NOTE — ED ADULT TRIAGE NOTE - CHIEF COMPLAINT QUOTE
Pt arrives to ED for subjective fevers since Thursday. Hospice for multiple myoma. DNR, paperwork with family. Pt minimally responsive, follows verbal commands, recognizes name.

## 2018-11-24 NOTE — ED ADULT NURSE NOTE - NSIMPLEMENTINTERV_GEN_ALL_ED
Implemented All Fall with Harm Risk Interventions:  [place] to call system. Call bell, personal items and telephone within reach. Instruct patient to call for assistance. Room bathroom lighting operational. Non-slip footwear when patient is off stretcher. Physically safe environment: no spills, clutter or unnecessary equipment. Stretcher in lowest position, wheels locked, appropriate side rails in place. Provide visual cue, wrist band, yellow gown, etc. Monitor gait and stability. Monitor for mental status changes and reorient to person, place, and time. Review medications for side effects contributing to fall risk. Reinforce activity limits and safety measures with patient and family. Provide visual clues: red socks.

## 2018-11-24 NOTE — ED PROVIDER NOTE - MEDICAL DECISION MAKING DETAILS
Pt is an 89 y/o M w/ PMHx of Multiple Myeloma on Hospice DNR/I with no aggressive measures to be done except medical therapy such as treatment of infections and transfusions only if would provide comfort who p/w fevers at home concerning for sepsis of unclear etiology vs progression of disease with tumor fever. Will get CBC w/ diff, CMP, VBG w/ lactate, Blood Cxs UA, UCx, CXR, RVP, give NS bolus, Zosyn, and likely will need admission. Would hold off on CT head for now given likely metabolic in nature and moving all extremities but if patient with plts<20 will get CT head give increased risk for ICH.

## 2018-11-24 NOTE — ED PROVIDER NOTE - PROGRESS NOTE DETAILS
spoke to Dr. Leyva and will admit to her service. Text Paged MAR.   -Juan Antonio Finley PGY4 [facility] [employer]#[phone]

## 2018-11-24 NOTE — ED PROVIDER NOTE - PHYSICAL EXAMINATION
ATTENDING PHYSICAL EXAM DR. Kim ***GEN - Cachectic. Noted shivering.  Answers to simple questions ***HEAD - NC/AT ***EYES/NOSE - PERRL, EOMI, mucous membranes dry, no discharge ***THROAT: Oral cavity and pharynx normal. No inflammation, swelling, exudate, or lesions.  ***NECK: Neck supple, non-tender without lymphadenopathy, no masses, no JVD.   ***PULMONARY - CTA b/l, symmetric breath sounds. ***CARDIAC -s1s2, tachy, no M,R,G  ***ABDOMEN - +BS, ND, NT, soft, no guarding, no rebound, no masses   ***BACK - no CVA tenderness, Normal  spine ***EXTREMITIES - symmetric pulses, 2+ dp, capillary refill < 2 seconds, no clubbing, no cyanosis, no edema ***SKIN - no rash or bruising   ***NEUROLOGIC - noncompliant with exam.

## 2018-11-24 NOTE — ED ADULT NURSE NOTE - OBJECTIVE STATEMENT
Pt. received in room 6 , A&Ox0 , as per Family pt. has a hx.  multiple myloma on hospice arrives w/ on going fevers. Pt. appears uncomfortable , shivering , and warm to touch. As per family pt. is alert and responds to name. Pt. responds  to name but does not answer any questions . Pt. Vitals as noted, and in no acute distress. IV placed on left hand , labs drawn and sent . Pt. placed on cardiac monitor . Will continue to monitor while in the ED.

## 2018-11-25 DIAGNOSIS — E87.2 ACIDOSIS: ICD-10-CM

## 2018-11-25 DIAGNOSIS — Z79.899 OTHER LONG TERM (CURRENT) DRUG THERAPY: ICD-10-CM

## 2018-11-25 DIAGNOSIS — C90.00 MULTIPLE MYELOMA NOT HAVING ACHIEVED REMISSION: ICD-10-CM

## 2018-11-25 DIAGNOSIS — I10 ESSENTIAL (PRIMARY) HYPERTENSION: ICD-10-CM

## 2018-11-25 DIAGNOSIS — N28.9 DISORDER OF KIDNEY AND URETER, UNSPECIFIED: ICD-10-CM

## 2018-11-25 DIAGNOSIS — E11.65 TYPE 2 DIABETES MELLITUS WITH HYPERGLYCEMIA: ICD-10-CM

## 2018-11-25 DIAGNOSIS — A41.9 SEPSIS, UNSPECIFIED ORGANISM: ICD-10-CM

## 2018-11-25 DIAGNOSIS — A41.89 OTHER SPECIFIED SEPSIS: ICD-10-CM

## 2018-11-25 DIAGNOSIS — Z29.9 ENCOUNTER FOR PROPHYLACTIC MEASURES, UNSPECIFIED: ICD-10-CM

## 2018-11-25 DIAGNOSIS — E83.52 HYPERCALCEMIA: ICD-10-CM

## 2018-11-25 LAB
ALBUMIN SERPL ELPH-MCNC: 2.5 G/DL — LOW (ref 3.3–5)
ALP SERPL-CCNC: 82 U/L — SIGNIFICANT CHANGE UP (ref 40–120)
ALT FLD-CCNC: 10 U/L — SIGNIFICANT CHANGE UP (ref 4–41)
APPEARANCE UR: SIGNIFICANT CHANGE UP
APTT BLD: 30.1 SEC — SIGNIFICANT CHANGE UP (ref 27.5–36.3)
APTT BLD: 32.2 SEC — SIGNIFICANT CHANGE UP (ref 27.5–36.3)
AST SERPL-CCNC: 24 U/L — SIGNIFICANT CHANGE UP (ref 4–40)
B PERT DNA SPEC QL NAA+PROBE: NOT DETECTED — SIGNIFICANT CHANGE UP
BACTERIA # UR AUTO: NEGATIVE — SIGNIFICANT CHANGE UP
BASE EXCESS BLDV CALC-SCNC: -4.2 MMOL/L — SIGNIFICANT CHANGE UP
BASOPHILS # BLD AUTO: 0.02 K/UL — SIGNIFICANT CHANGE UP (ref 0–0.2)
BASOPHILS NFR BLD AUTO: 0.3 % — SIGNIFICANT CHANGE UP (ref 0–2)
BILIRUB SERPL-MCNC: 1.3 MG/DL — HIGH (ref 0.2–1.2)
BILIRUB UR-MCNC: NEGATIVE — SIGNIFICANT CHANGE UP
BLOOD GAS VENOUS - CREATININE: 2.7 MG/DL — HIGH (ref 0.5–1.3)
BLOOD UR QL VISUAL: NEGATIVE — SIGNIFICANT CHANGE UP
BUN SERPL-MCNC: 37 MG/DL — HIGH (ref 7–23)
C PNEUM DNA SPEC QL NAA+PROBE: NOT DETECTED — SIGNIFICANT CHANGE UP
CALCIUM SERPL-MCNC: 10.9 MG/DL — HIGH (ref 8.4–10.5)
CHLORIDE BLDV-SCNC: 119 MMOL/L — HIGH (ref 96–108)
CHLORIDE SERPL-SCNC: 114 MMOL/L — HIGH (ref 98–107)
CO2 SERPL-SCNC: 17 MMOL/L — LOW (ref 22–31)
COLOR SPEC: YELLOW — SIGNIFICANT CHANGE UP
CREAT SERPL-MCNC: 2.54 MG/DL — HIGH (ref 0.5–1.3)
EOSINOPHIL # BLD AUTO: 0.01 K/UL — SIGNIFICANT CHANGE UP (ref 0–0.5)
EOSINOPHIL NFR BLD AUTO: 0.1 % — SIGNIFICANT CHANGE UP (ref 0–6)
FLUAV H1 2009 PAND RNA SPEC QL NAA+PROBE: NOT DETECTED — SIGNIFICANT CHANGE UP
FLUAV H1 RNA SPEC QL NAA+PROBE: NOT DETECTED — SIGNIFICANT CHANGE UP
FLUAV H3 RNA SPEC QL NAA+PROBE: NOT DETECTED — SIGNIFICANT CHANGE UP
FLUAV SUBTYP SPEC NAA+PROBE: SIGNIFICANT CHANGE UP
FLUBV RNA SPEC QL NAA+PROBE: NOT DETECTED — SIGNIFICANT CHANGE UP
GAS PNL BLDV: 141 MMOL/L — SIGNIFICANT CHANGE UP (ref 136–146)
GLUCOSE BLDC GLUCOMTR-MCNC: 136 MG/DL — HIGH (ref 70–99)
GLUCOSE BLDC GLUCOMTR-MCNC: 143 MG/DL — HIGH (ref 70–99)
GLUCOSE BLDC GLUCOMTR-MCNC: 90 MG/DL — SIGNIFICANT CHANGE UP (ref 70–99)
GLUCOSE BLDV-MCNC: 162 — HIGH (ref 70–99)
GLUCOSE SERPL-MCNC: 150 MG/DL — HIGH (ref 70–99)
GLUCOSE UR-MCNC: NEGATIVE — SIGNIFICANT CHANGE UP
HADV DNA SPEC QL NAA+PROBE: NOT DETECTED — SIGNIFICANT CHANGE UP
HCO3 BLDV-SCNC: 21 MMOL/L — SIGNIFICANT CHANGE UP (ref 20–27)
HCOV PNL SPEC NAA+PROBE: POSITIVE — HIGH
HCT VFR BLD CALC: 36.7 % — LOW (ref 39–50)
HCT VFR BLDV CALC: 36.4 % — LOW (ref 39–51)
HGB BLD-MCNC: 12 G/DL — LOW (ref 13–17)
HGB BLDV-MCNC: 11.8 G/DL — LOW (ref 13–17)
HMPV RNA SPEC QL NAA+PROBE: NOT DETECTED — SIGNIFICANT CHANGE UP
HPIV1 RNA SPEC QL NAA+PROBE: NOT DETECTED — SIGNIFICANT CHANGE UP
HPIV2 RNA SPEC QL NAA+PROBE: NOT DETECTED — SIGNIFICANT CHANGE UP
HPIV3 RNA SPEC QL NAA+PROBE: NOT DETECTED — SIGNIFICANT CHANGE UP
HPIV4 RNA SPEC QL NAA+PROBE: NOT DETECTED — SIGNIFICANT CHANGE UP
HYALINE CASTS # UR AUTO: SIGNIFICANT CHANGE UP
IMM GRANULOCYTES # BLD AUTO: 0.07 # — SIGNIFICANT CHANGE UP
IMM GRANULOCYTES NFR BLD AUTO: 0.9 % — SIGNIFICANT CHANGE UP (ref 0–1.5)
INR BLD: 1.43 — HIGH (ref 0.88–1.17)
INR BLD: 1.56 — HIGH (ref 0.88–1.17)
KETONES UR-MCNC: NEGATIVE — SIGNIFICANT CHANGE UP
LACTATE BLDV-MCNC: 2.4 MMOL/L — HIGH (ref 0.5–2)
LEUKOCYTE ESTERASE UR-ACNC: NEGATIVE — SIGNIFICANT CHANGE UP
LYMPHOCYTES # BLD AUTO: 4.24 K/UL — HIGH (ref 1–3.3)
LYMPHOCYTES # BLD AUTO: 53.8 % — HIGH (ref 13–44)
MCHC RBC-ENTMCNC: 28.2 PG — SIGNIFICANT CHANGE UP (ref 27–34)
MCHC RBC-ENTMCNC: 32.7 % — SIGNIFICANT CHANGE UP (ref 32–36)
MCV RBC AUTO: 86.4 FL — SIGNIFICANT CHANGE UP (ref 80–100)
MONOCYTES # BLD AUTO: 0.35 K/UL — SIGNIFICANT CHANGE UP (ref 0–0.9)
MONOCYTES NFR BLD AUTO: 4.4 % — SIGNIFICANT CHANGE UP (ref 2–14)
NEUTROPHILS # BLD AUTO: 3.19 K/UL — SIGNIFICANT CHANGE UP (ref 1.8–7.4)
NEUTROPHILS NFR BLD AUTO: 40.5 % — LOW (ref 43–77)
NITRITE UR-MCNC: NEGATIVE — SIGNIFICANT CHANGE UP
NRBC # FLD: 0.02 — SIGNIFICANT CHANGE UP
PCO2 BLDV: 31 MMHG — LOW (ref 41–51)
PH BLDV: 7.42 PH — SIGNIFICANT CHANGE UP (ref 7.32–7.43)
PH UR: 5.5 — SIGNIFICANT CHANGE UP (ref 5–8)
PLATELET # BLD AUTO: 138 K/UL — LOW (ref 150–400)
PMV BLD: 9.6 FL — SIGNIFICANT CHANGE UP (ref 7–13)
PO2 BLDV: 61 MMHG — HIGH (ref 35–40)
POTASSIUM BLDV-SCNC: 3.7 MMOL/L — SIGNIFICANT CHANGE UP (ref 3.4–4.5)
POTASSIUM SERPL-MCNC: 3.5 MMOL/L — SIGNIFICANT CHANGE UP (ref 3.5–5.3)
POTASSIUM SERPL-SCNC: 3.5 MMOL/L — SIGNIFICANT CHANGE UP (ref 3.5–5.3)
PROT SERPL-MCNC: 7.8 G/DL — SIGNIFICANT CHANGE UP (ref 6–8.3)
PROT UR-MCNC: 70 — SIGNIFICANT CHANGE UP
PROTHROM AB SERPL-ACNC: 16.1 SEC — HIGH (ref 9.8–13.1)
PROTHROM AB SERPL-ACNC: 18 SEC — HIGH (ref 9.8–13.1)
RBC # BLD: 4.25 M/UL — SIGNIFICANT CHANGE UP (ref 4.2–5.8)
RBC # FLD: 15.9 % — HIGH (ref 10.3–14.5)
RBC CASTS # UR COMP ASSIST: HIGH (ref 0–?)
RSV RNA SPEC QL NAA+PROBE: NOT DETECTED — SIGNIFICANT CHANGE UP
RV+EV RNA SPEC QL NAA+PROBE: POSITIVE — HIGH
SAO2 % BLDV: 90.9 % — HIGH (ref 60–85)
SODIUM SERPL-SCNC: 140 MMOL/L — SIGNIFICANT CHANGE UP (ref 135–145)
SP GR SPEC: 1.02 — SIGNIFICANT CHANGE UP (ref 1–1.04)
SPECIMEN SOURCE: SIGNIFICANT CHANGE UP
SQUAMOUS # UR AUTO: SIGNIFICANT CHANGE UP
UROBILINOGEN FLD QL: NORMAL — SIGNIFICANT CHANGE UP
WBC # BLD: 7.88 K/UL — SIGNIFICANT CHANGE UP (ref 3.8–10.5)
WBC # FLD AUTO: 7.88 K/UL — SIGNIFICANT CHANGE UP (ref 3.8–10.5)
WBC UR QL: HIGH (ref 0–?)

## 2018-11-25 PROCEDURE — 70450 CT HEAD/BRAIN W/O DYE: CPT | Mod: 26

## 2018-11-25 PROCEDURE — 99223 1ST HOSP IP/OBS HIGH 75: CPT

## 2018-11-25 RX ORDER — SODIUM CHLORIDE 9 MG/ML
1000 INJECTION, SOLUTION INTRAVENOUS
Qty: 0 | Refills: 0 | Status: DISCONTINUED | OUTPATIENT
Start: 2018-11-25 | End: 2018-11-25

## 2018-11-25 RX ORDER — INSULIN LISPRO 100/ML
VIAL (ML) SUBCUTANEOUS
Qty: 0 | Refills: 0 | Status: DISCONTINUED | OUTPATIENT
Start: 2018-11-25 | End: 2018-11-25

## 2018-11-25 RX ORDER — DEXTROSE 50 % IN WATER 50 %
15 SYRINGE (ML) INTRAVENOUS ONCE
Qty: 0 | Refills: 0 | Status: DISCONTINUED | OUTPATIENT
Start: 2018-11-25 | End: 2018-11-25

## 2018-11-25 RX ORDER — ASPIRIN/CALCIUM CARB/MAGNESIUM 324 MG
300 TABLET ORAL EVERY 12 HOURS
Qty: 0 | Refills: 0 | Status: DISCONTINUED | OUTPATIENT
Start: 2018-11-25 | End: 2018-11-28

## 2018-11-25 RX ORDER — DEXTROSE 50 % IN WATER 50 %
25 SYRINGE (ML) INTRAVENOUS ONCE
Qty: 0 | Refills: 0 | Status: DISCONTINUED | OUTPATIENT
Start: 2018-11-25 | End: 2018-11-25

## 2018-11-25 RX ORDER — INSULIN LISPRO 100/ML
VIAL (ML) SUBCUTANEOUS AT BEDTIME
Qty: 0 | Refills: 0 | Status: DISCONTINUED | OUTPATIENT
Start: 2018-11-25 | End: 2018-11-25

## 2018-11-25 RX ORDER — PIPERACILLIN AND TAZOBACTAM 4; .5 G/20ML; G/20ML
3.38 INJECTION, POWDER, LYOPHILIZED, FOR SOLUTION INTRAVENOUS EVERY 12 HOURS
Qty: 0 | Refills: 0 | Status: DISCONTINUED | OUTPATIENT
Start: 2018-11-25 | End: 2018-11-28

## 2018-11-25 RX ORDER — HEPARIN SODIUM 5000 [USP'U]/ML
5000 INJECTION INTRAVENOUS; SUBCUTANEOUS EVERY 12 HOURS
Qty: 0 | Refills: 0 | Status: DISCONTINUED | OUTPATIENT
Start: 2018-11-25 | End: 2018-11-28

## 2018-11-25 RX ORDER — DEXTROSE 50 % IN WATER 50 %
12.5 SYRINGE (ML) INTRAVENOUS ONCE
Qty: 0 | Refills: 0 | Status: DISCONTINUED | OUTPATIENT
Start: 2018-11-25 | End: 2018-11-25

## 2018-11-25 RX ORDER — ACETAMINOPHEN 500 MG
1000 TABLET ORAL ONCE
Qty: 0 | Refills: 0 | Status: COMPLETED | OUTPATIENT
Start: 2018-11-25 | End: 2018-11-25

## 2018-11-25 RX ORDER — ACETAMINOPHEN 500 MG
650 TABLET ORAL EVERY 8 HOURS
Qty: 0 | Refills: 0 | Status: DISCONTINUED | OUTPATIENT
Start: 2018-11-25 | End: 2018-11-28

## 2018-11-25 RX ORDER — SODIUM CHLORIDE 9 MG/ML
1000 INJECTION, SOLUTION INTRAVENOUS
Qty: 0 | Refills: 0 | Status: DISCONTINUED | OUTPATIENT
Start: 2018-11-25 | End: 2018-11-26

## 2018-11-25 RX ORDER — GLUCAGON INJECTION, SOLUTION 0.5 MG/.1ML
1 INJECTION, SOLUTION SUBCUTANEOUS ONCE
Qty: 0 | Refills: 0 | Status: DISCONTINUED | OUTPATIENT
Start: 2018-11-25 | End: 2018-11-25

## 2018-11-25 RX ADMIN — Medication 1000 MILLIGRAM(S): at 01:45

## 2018-11-25 RX ADMIN — SODIUM CHLORIDE 1000 MILLILITER(S): 9 INJECTION INTRAMUSCULAR; INTRAVENOUS; SUBCUTANEOUS at 00:29

## 2018-11-25 RX ADMIN — HEPARIN SODIUM 5000 UNIT(S): 5000 INJECTION INTRAVENOUS; SUBCUTANEOUS at 07:31

## 2018-11-25 RX ADMIN — SODIUM CHLORIDE 1000 MILLILITER(S): 9 INJECTION INTRAMUSCULAR; INTRAVENOUS; SUBCUTANEOUS at 00:30

## 2018-11-25 RX ADMIN — SODIUM CHLORIDE 75 MILLILITER(S): 9 INJECTION, SOLUTION INTRAVENOUS at 06:04

## 2018-11-25 RX ADMIN — PIPERACILLIN AND TAZOBACTAM 3.38 GRAM(S): 4; .5 INJECTION, POWDER, LYOPHILIZED, FOR SOLUTION INTRAVENOUS at 00:30

## 2018-11-25 RX ADMIN — Medication 400 MILLIGRAM(S): at 01:26

## 2018-11-25 RX ADMIN — SODIUM CHLORIDE 1000 MILLILITER(S): 9 INJECTION INTRAMUSCULAR; INTRAVENOUS; SUBCUTANEOUS at 01:30

## 2018-11-25 RX ADMIN — PIPERACILLIN AND TAZOBACTAM 25 GRAM(S): 4; .5 INJECTION, POWDER, LYOPHILIZED, FOR SOLUTION INTRAVENOUS at 09:52

## 2018-11-25 RX ADMIN — SODIUM CHLORIDE 75 MILLILITER(S): 9 INJECTION, SOLUTION INTRAVENOUS at 17:42

## 2018-11-25 RX ADMIN — PIPERACILLIN AND TAZOBACTAM 25 GRAM(S): 4; .5 INJECTION, POWDER, LYOPHILIZED, FOR SOLUTION INTRAVENOUS at 22:22

## 2018-11-25 NOTE — ED ADULT NURSE REASSESSMENT NOTE - NS ED NURSE REASSESS COMMENT FT1
report received from night shift RN. Pts vitals as noted, no new orders at this time. Will continue to monitor. report received from night shift RN. Pts vitals as noted, no new orders at this time. Pt appears in no acute distress. Will continue to monitor.

## 2018-11-25 NOTE — H&P ADULT - PROBLEM SELECTOR PLAN 6
Clarify home medications with the family in AM (primary team) -Hold oral agents  -IHSS  -DM diet once encephalopathy resolves

## 2018-11-25 NOTE — H&P ADULT - PROBLEM SELECTOR PLAN 2
will c/w Zosyn IV renally dosed until BCx negative given immunocompromised status and prior (and recent) h/o GN bacteremia: E. coli and K. pneumonia;  -f/u BCx  -de-escalate Abx once Bcx negative  -UA not c/w UTI  -CXR - clear lungs - my reading, f/u official CXR report -will c/w Zosyn IV renally dosed until BCx negative given immunocompromised status and prior (and recent) h/o GN bacteremia: E. coli and K. pneumonia;  -f/u BCx  -de-escalate Abx once Bcx negative  -UA not c/w UTI  -CXR - clear lungs - my reading, f/u official CXR report

## 2018-11-25 NOTE — H&P ADULT - LYMPHATIC
posterior cervical R/anterior cervical L/supraclavicular L/anterior cervical R/supraclavicular R/posterior cervical L

## 2018-11-25 NOTE — H&P ADULT - ASSESSMENT
89 y/o Male w/ MHx of Alzheimer's (oriented usually to self only), HTN, DM Type 2, CAD s/p bypass c/b A-fib (not on ASA, on home hospice), multiple myeloma c/b multiple osseous lytic lesions with moderate compression Fx of T12 and L3 per CT A/P dated 8/23/18 (on hospice DNR/I), recent admission for aspiration PNA and bacteremia: E. coli and K. pneumonia a/w viral sepsis due to both Entero/Rhino and Corona viruses c/b acute on chronic renal insufficiency  and septic encephalopathy; Cannot r/o co-current occult bacteremia; 89 y/o Male w/ MHx of Alzheimer's (oriented usually to self only), HTN, DM Type 2, CAD s/p bypass c/b A-fib (not on ASA, on home hospice), multiple myeloma c/b multiple osseous lytic lesions with moderate compression Fx of T12 and L3 per CT A/P dated 8/23/18 (on hospice DNR/I), recent admission for aspiration PNA and bacteremia: E. coli and K. pneumonia a/w viral sepsis due to both Entero/Rhino and Corona viruses c/b acute on chronic renal insufficiency and septic encephalopathy in the context of immunocompromised status and debilty; Cannot r/o co-current occult bacteremia; Overall prognosis is poor; 88 y/o Male w/ MHx of Alzheimer's (oriented usually to self only), HTN, DM Type 2, CAD s/p bypass c/b A-fib (not on ASA, on home hospice), multiple myeloma c/b multiple osseous lytic lesions with moderate compression Fx of T12 and L3 per CT A/P dated 8/23/18 (on hospice DNR/I), recent admission for aspiration PNA and bacteremia: E. coli and K. pneumonia a/w viral sepsis due to both Entero/Rhino and Corona viruses c/b acute on chronic renal insufficiency, dehydration, hypercalcemia and septic encephalopathy in the context of immunocompromised status and debility; Cannot r/o co-current occult bacteremia; Overall prognosis is poor;

## 2018-11-25 NOTE — H&P ADULT - NSHPLABSRESULTS_GEN_ALL_CORE
CXR - clear lungs, sternotomy wires present, no effusions - my reading     CT BRAIN   PROCEDURE DATE: Nov 25 2018   FINDINGS: No intracranial hemorrhage is seen. The grey-white differentiation   is maintained. Chronic left posterior parietal lobe infarct. Mild   periventricular and subcortical white matter hypoattenuation without mass   effect is noted, non-specific, but likely related to chronic small vessel   ischemic changes.   Cerebral volume loss is present with proportional prominence of the sulci   and ventricles.   No mass effect or midline shift is seen. Basal cisterns are not effaced.   The visualized paranasal sinuses and tympanomastoid cavities are clear.   No osseous abnormality seen.   IMPRESSION: No intracranial hemorrhage or mass effect. Chronic left parietal   lobe infarct.

## 2018-11-25 NOTE — H&P ADULT - PROBLEM SELECTOR PLAN 1
Both Entero/Rhino and Corona viruses (+) on RVP; c/b encephalopathy; c/b septic encephalopathy;  -NPO for now  -avoid sedatives / opioids for now given encephalopathy   -Supportive care  -Tylenol and Asa for pyrexia   -IV hydration

## 2018-11-25 NOTE — H&P ADULT - PROBLEM SELECTOR PLAN 5
-Hold oral agents  -IHSS  -DM diet once encephalopathy resolves -On home hospice;  -clarify pain control regimen at home (primary team in am)  -DNR/DNI

## 2018-11-25 NOTE — H&P ADULT - PROBLEM SELECTOR PLAN 3
Multifactorial etiology due Multifactorial etiology due possibly worsening MM, current sepsis, decreased oral intake and resulting dehydration;  -Renal dosage of medications  -IV hydration  -Monitor renal function

## 2018-11-25 NOTE — H&P ADULT - PROBLEM SELECTOR PROBLEM 5
Type 2 diabetes mellitus with hyperglycemia, without long-term current use of insulin Multiple myeloma not having achieved remission

## 2018-11-25 NOTE — H&P ADULT - PROBLEM SELECTOR PLAN 4
-On home hospice Likely multifactorial due to MM and dehydration;   -Monitor calcium daily  -c/w IV hydration

## 2018-11-25 NOTE — H&P ADULT - PROBLEM SELECTOR PROBLEM 6
Medication management Type 2 diabetes mellitus with hyperglycemia, without long-term current use of insulin

## 2018-11-26 ENCOUNTER — TRANSCRIPTION ENCOUNTER (OUTPATIENT)
Age: 83
End: 2018-11-26

## 2018-11-26 DIAGNOSIS — F03.90 UNSPECIFIED DEMENTIA WITHOUT BEHAVIORAL DISTURBANCE: ICD-10-CM

## 2018-11-26 DIAGNOSIS — Z51.5 ENCOUNTER FOR PALLIATIVE CARE: ICD-10-CM

## 2018-11-26 DIAGNOSIS — R06.82 TACHYPNEA, NOT ELSEWHERE CLASSIFIED: ICD-10-CM

## 2018-11-26 DIAGNOSIS — R53.2 FUNCTIONAL QUADRIPLEGIA: ICD-10-CM

## 2018-11-26 LAB
BUN SERPL-MCNC: 36 MG/DL — HIGH (ref 7–23)
CALCIUM SERPL-MCNC: 11.2 MG/DL — HIGH (ref 8.4–10.5)
CHLORIDE SERPL-SCNC: 114 MMOL/L — HIGH (ref 98–107)
CO2 SERPL-SCNC: 23 MMOL/L — SIGNIFICANT CHANGE UP (ref 22–31)
CREAT SERPL-MCNC: 2.24 MG/DL — HIGH (ref 0.5–1.3)
GLUCOSE BLDC GLUCOMTR-MCNC: 102 MG/DL — HIGH (ref 70–99)
GLUCOSE BLDC GLUCOMTR-MCNC: 76 MG/DL — SIGNIFICANT CHANGE UP (ref 70–99)
GLUCOSE SERPL-MCNC: 81 MG/DL — SIGNIFICANT CHANGE UP (ref 70–99)
HCT VFR BLD CALC: 39.6 % — SIGNIFICANT CHANGE UP (ref 39–50)
HGB BLD-MCNC: 12.7 G/DL — LOW (ref 13–17)
MCHC RBC-ENTMCNC: 28 PG — SIGNIFICANT CHANGE UP (ref 27–34)
MCHC RBC-ENTMCNC: 32.1 % — SIGNIFICANT CHANGE UP (ref 32–36)
MCV RBC AUTO: 87.2 FL — SIGNIFICANT CHANGE UP (ref 80–100)
NRBC # FLD: 0 — SIGNIFICANT CHANGE UP
PLATELET # BLD AUTO: 130 K/UL — LOW (ref 150–400)
PMV BLD: 9.3 FL — SIGNIFICANT CHANGE UP (ref 7–13)
POTASSIUM SERPL-MCNC: 3.6 MMOL/L — SIGNIFICANT CHANGE UP (ref 3.5–5.3)
POTASSIUM SERPL-SCNC: 3.6 MMOL/L — SIGNIFICANT CHANGE UP (ref 3.5–5.3)
RBC # BLD: 4.54 M/UL — SIGNIFICANT CHANGE UP (ref 4.2–5.8)
RBC # FLD: 15.8 % — HIGH (ref 10.3–14.5)
SODIUM SERPL-SCNC: 145 MMOL/L — SIGNIFICANT CHANGE UP (ref 135–145)
SPECIMEN SOURCE: SIGNIFICANT CHANGE UP
WBC # BLD: 8.52 K/UL — SIGNIFICANT CHANGE UP (ref 3.8–10.5)
WBC # FLD AUTO: 8.52 K/UL — SIGNIFICANT CHANGE UP (ref 3.8–10.5)

## 2018-11-26 PROCEDURE — 99223 1ST HOSP IP/OBS HIGH 75: CPT

## 2018-11-26 PROCEDURE — 76770 US EXAM ABDO BACK WALL COMP: CPT | Mod: 26

## 2018-11-26 RX ORDER — SODIUM CHLORIDE 9 MG/ML
1000 INJECTION, SOLUTION INTRAVENOUS
Qty: 0 | Refills: 0 | Status: DISCONTINUED | OUTPATIENT
Start: 2018-11-26 | End: 2018-11-27

## 2018-11-26 RX ADMIN — PIPERACILLIN AND TAZOBACTAM 25 GRAM(S): 4; .5 INJECTION, POWDER, LYOPHILIZED, FOR SOLUTION INTRAVENOUS at 11:45

## 2018-11-26 RX ADMIN — HEPARIN SODIUM 5000 UNIT(S): 5000 INJECTION INTRAVENOUS; SUBCUTANEOUS at 19:35

## 2018-11-26 RX ADMIN — SODIUM CHLORIDE 75 MILLILITER(S): 9 INJECTION, SOLUTION INTRAVENOUS at 21:10

## 2018-11-26 RX ADMIN — PIPERACILLIN AND TAZOBACTAM 25 GRAM(S): 4; .5 INJECTION, POWDER, LYOPHILIZED, FOR SOLUTION INTRAVENOUS at 22:43

## 2018-11-26 NOTE — CONSULT NOTE ADULT - PROBLEM SELECTOR RECOMMENDATION 2
Patient with Fast score of >6F. Continue supportive care.
has h/o Multiple myeloma  Monitor Ca level at present  Continue IVF

## 2018-11-26 NOTE — DISCHARGE NOTE ADULT - HOME CARE AGENCY
[phone] This agency will send a nurse to your home to evaluate your care. [facility]- [phone]- This agency will reinstate your Aide from [facility] dugell-[phone].

## 2018-11-26 NOTE — DISCHARGE NOTE ADULT - CARE PROVIDER_API CALL
Alen Leyva), Internal Medicine  [address] [place] [address]  Phone: [phone]  Fax: [phone]

## 2018-11-26 NOTE — PROGRESS NOTE ADULT - ASSESSMENT
88 y/o Male w/ MHx of Alzheimer's (oriented usually to self only), HTN, DM Type 2, CAD s/p bypass c/b A-fib (not on ASA, on home hospice), multiple myeloma c/b multiple osseous lytic lesions with moderate compression Fx of T12 and L3 per CT A/P dated 8/23/18 (on hospice DNR/I), recent admission for aspiration PNA and bacteremia: E. coli and K. pneumonia a/w viral sepsis due to both Entero/Rhino and Corona viruses c/b acute on chronic renal insufficiency, dehydration, hypercalcemia and septic encephalopathy in the context of immunocompromised status and debility; Cannot r/o co-current occult bacteremia; Overall prognosis is poor;

## 2018-11-26 NOTE — PROGRESS NOTE ADULT - ASSESSMENT
8 y/o Male w/ MHx of Alzheimer's (oriented usually to self only), HTN, DM Type 2, CAD s/p bypass c/b A-fib (not on ASA, on home hospice), multiple myeloma c/b multiple osseous lytic lesions with moderate compression Fx of T12 and L3 per CT A/P dated 8/23/18 (on hospice DNR/I) with no aggressive measures to be done except medical therapy, presents with fever/lethargy at home. Found to have viral sepsis. Has renal dysfunction on admission.

## 2018-11-26 NOTE — DISCHARGE NOTE ADULT - MEDICATION SUMMARY - MEDICATIONS TO TAKE
I will START or STAY ON the medications listed below when I get home from the hospital:    acetaminophen 650 mg rectal suppository  -- 1 suppository(ies) rectally every 6 hours, As needed, Mild Pain (1 - 3)  -- Indication: For Need for prophylactic measure    Januvia 50 mg oral tablet  -- 1 tab(s) by mouth once a day  -- Indication: For Type 2 diabetes mellitus with hyperglycemia, without long-term current use of insulin I will START or STAY ON the medications listed below when I get home from the hospital:    acetaminophen 650 mg rectal suppository  -- 1 suppository(ies) rectally every 6 hours, As needed, Mild Pain (1 - 3)  -- Indication: For Need for prophylactic measure    Januvia 50 mg oral tablet  -- 1 tab(s) by mouth once a day  -- Indication: For Type 2 diabetes mellitus with hyperglycemia, without long-term current use of insulin    potassium phosphate-sodium phosphate 305 mg-700 mg oral tablet  -- 1 tab(s) by mouth 4 times a day (with meals and at bedtime)  -- Indication: For Need for prophylactic measure

## 2018-11-26 NOTE — DISCHARGE NOTE ADULT - PLAN OF CARE
resolution Enterovirus and coronavirus positive, viral upper respiratory infections.  Supportive care of viral infections. Nephrology was consulted -  Renal ultrasound----------- Continue with supportive care, comfort measures, hospice care. Your a1c is------- Your a1c is 6.9. Continue consistent carbohydrate diet.  Monitor blood glucose levels throughout the day before meals and at bedtime.  Record blood sugars and bring to outpatient providers appointment in order to be reviewed by your doctor for management modifications.  Be aware of diabetes management symptoms including feeling cool and clammy may be related to low glucose levels.  Feeling hot and dry may indicate high glucose levels.  If  you feel these symptoms, check your blood sugar.  Make regular podiatry appointments in order to have feet checked for wounds and toe nails cut by a doctor to prevent infections. Nephrology was consulted -  Renal ultrasound   -renal US---Bilateral cysts and echogenic foci likely representing intrarenal   calculi. No hydronephrosis. Monitor for any visual changes, headaches or dizziness.  Monitor blood pressure regularly.  Follow up with your PCP for further management for high blood pressure, please call to make appointment within 1 week of discharge

## 2018-11-26 NOTE — PROGRESS NOTE ADULT - PROBLEM SELECTOR PLAN 3
Multifactorial etiology due possibly worsening MM, current sepsis, decreased oral intake and resulting dehydration;  -Renal dosage of medications  -IV hydration  -Monitor renal function

## 2018-11-26 NOTE — DISCHARGE NOTE ADULT - PATIENT PORTAL LINK FT
You can access the [facility], offered by [facility], by registering with the following website: [email or web address]

## 2018-11-26 NOTE — CONSULT NOTE ADULT - PROBLEM SELECTOR RECOMMENDATION 3
Patient with no symptoms at this time. If symptoms arise, can initiate Dilaudid 0.2mg IV q3h PRN.
BP is low sec to sepsis  Monitor at present

## 2018-11-26 NOTE — PROGRESS NOTE ADULT - PROBLEM SELECTOR PLAN 1
Baseline Scr 1.2-1.3  ERIKA likely sec to sepsis  Possible ATN, rule out pre-renal state sec to SIRS  Continue IVF, while monitoring for fluid overload  Get urine Na, Cr   Monitor I/O  Avoid nephrotoxic meds. Baseline Scr 1.2-1.3  ERIKA likely sec to sepsis  Possible ATN, rule out pre-renal state sec to SIRS  Continue IVF, while monitoring for fluid overload  Renal US is negative for obstruction  Get urine Na, Cr   Monitor I/O  Avoid nephrotoxic meds.

## 2018-11-26 NOTE — CONSULT NOTE ADULT - PROBLEM SELECTOR RECOMMENDATION 9
Patient not a candidate for disease modifying therapies. [facility]. Continue support care.
Baseline Scr 1.2-1.3  ERIKA likely sec to sepsis  Possible ATN, rule out pre-renal state sec to SIRS  Continue IVF, while monitoring for fluid overload  Get UA, urine Na, Cr, renal US  Monitor I/O  Avoid nephrotoxic meds

## 2018-11-26 NOTE — DISCHARGE NOTE ADULT - HOSPITAL COURSE
89 y/o Male w/ MHx of Alzheimer's (oriented usually to self only), HTN, DM Type 2, CAD s/p bypass c/b A-fib (not on ASA, on home hospice), multiple myeloma c/b multiple osseous lytic lesions with moderate compression Fx of T12 and L3 per CT A/P dated 8/23/18 (on hospice DNR/I), recent admission for aspiration PNA and bacteremia: E. coli and K. pneumonia a/w viral sepsis due to both Entero/Rhino and Corona viruses c/b acute on chronic renal insufficiency  and septic encephalopathy; Cannot r/o co-current occult bacteremia;     Cultures-------------  Renal following------------      dispo: 88 y/o Male w/ MHx of Alzheimer's (oriented usually to self only), HTN, DM Type 2, CAD s/p bypass c/b A-fib (not on ASA, on home hospice), multiple myeloma c/b multiple osseous lytic lesions with moderate compression Fx of T12 and L3 per CT A/P dated 8/23/18 (on hospice DNR/I), recent admission for aspiration PNA and bacteremia: E. coli and K. pneumonia a/w viral sepsis due to both Entero/Rhino and Corona viruses c/b acute on chronic renal insufficiency  and septic encephalopathy; Cannot r/o co-current occult bacteremia;     Cultures negative   Renal following Bilateral cysts and echogenic foci likely representing intrarenal   calculi. No hydronephrosis.        dispo:

## 2018-11-26 NOTE — CONSULT NOTE ADULT - SUBJECTIVE AND OBJECTIVE BOX
HPI:  89 year old man with Alzheimer's (oriented usually to self only), HTN, DM Type 2, CAD s/p bypass c/b A-fib (not on ASA, on home hospice), multiple myeloma c/b multiple osseous lytic lesions with moderate compression Fx of T12 and L3 per CT A/P dated 18 (on hospice DNR/I) with no aggressive measures to be done except medical therapy such as treatment of infections and transfusions only if would provide comfort, recent admission for aspiration PNA and bacteremia: BCx  grew E. coli, BCx  grew K. pneumonia; On this admission the pt presents with fevers at home. According to daughter at bedside yesterday patient had some vomiting and then during day today has become more lethargic and was febrile. Family also noted a wet cough, but denies any new rashes, no blood in vomit, no diarrhea, no new one sided deficits. Pt normal conversive with family be confused about place and date and time. Pt confused, collateral history was obtained from the family, per ED notes and daughter at bedside. the pt is usually able to speak with some coherence, has been confused today with decreased PO intake. Family prefer no invasive interventions. No medical FHx of MM in parents or siblings. Patient in bed in no acute distress at this time.     PERTINENT PM/SXH:   Hep C w/o coma, chronic  Tubular adenoma of colon  Gastritis  DM (diabetes mellitus)  GERD (gastroesophageal reflux disease)  HTN (hypertension)  History of subdural hematoma (post traumatic)    S/P CABG x 3  No Past Surgical History    FAMILY HISTORY:  No pertinent family history in first degree relatives      SOCIAL HISTORY:   Significant other/partner: Yes [ ]  No [ x] Children:  Yes [ x]  No [ ] Confucianism/Spirituality:  Substance hx: Yes[ ]  No [x ]   Tobacco hx:  Yes [ ] No [x]    Alcohol hx: Yes [ ] No [ x]   Home Opioid hx:  [ ] I-Stop Reference No:  Living Situation: [x ]Home  [ ]Long term care  [ ]Rehab [ ]Other    ADVANCE DIRECTIVES:    DNR  Yes  MOLST  [ ]  Living Will  [ ]   DECISION MAKER(s):  [ ] Health Care Proxy(s)  [ x] Surrogate(s)  [ ] Guardian           Name(s): Phone Number(s): Eulalia Dumas          Phone#: [phone]    BASELINE (I)ADL(s) (prior to admission):  Cougar: [ ]Total  [ ] Moderate [x ]Dependent    Allergies    No Known Allergies    Intolerances    MEDICATIONS  (STANDING):  dextrose 5% + sodium chloride 0.9%. 1000 milliLiter(s) (75 mL/Hr) IV Continuous <Continuous>  heparin  Injectable 5000 Unit(s) SubCutaneous every 12 hours  piperacillin/tazobactam IVPB. 3.375 Gram(s) IV Intermittent every 12 hours    MEDICATIONS  (PRN):  acetaminophen   Tablet .. 650 milliGRAM(s) Oral every 8 hours PRN Temp greater or equal to 38C (100.4F)  aspirin Suppository 300 milliGRAM(s) Rectal every 12 hours PRN Temp over 38.5 C    PRESENT SYMPTOMS: [x ]Unable to obtain due to poor mentation   Source if other than patient:  [ ]Family   [ ]Team     Pain (Impact on QOL):    Location -   Severity -        Minimal acceptable level (0-10 scale):  Quality:   Onset:   Duration:                 Aggravating factors -  Relieving factors -  Radiation -    PAIN AD Score: No non verbal pain indicators noted.     [email or web address] (press ctrl +  left click to view)    Dyspnea:  Yes [ ] No [ ] - [ ]Mild [ ]Moderate [ ]Severe  Anxiety:    Yes [ ] No [ ] - [ ]Mild [ ]Moderate [ ]Severe  Fatigue:    Yes [ ] No [ ] - [ ]Mild [ ]Moderate [ ]Severe  Nausea:    Yes [ ] No [ ] - [ ]Mild [ ]Moderate [ ]Severe                         Loss of appetite: Yes [ ] No [ ] - [ ]Mild [ ]Moderate [ ]Severe             Constipation:  Yes [ ] No [ ] - [ ]Mild [ ]Moderate [ ]Severe    Other Symptoms:  [ ]All other review of systems negative     Karnofsky Performance Score/Palliative Performance Status Version 2:        30-40 %    [email or web address]://[email or web address]    PHYSICAL EXAM:  Vital Signs Last 24 Hrs  T(C): 36.6 (2018 15:15), Max: 36.8 (2018 05:06)  T(F): 97.9 (2018 15:15), Max: 98.3 (2018 05:06)  HR: 75 (2018 15:15) (50 - 108)  BP: 127/80 (2018 15:15) (108/65 - 127/80)  BP(mean): --  RR: 18 (2018 15:15) (18 - 20)  SpO2: 100% (2018 15:15) (97% - 100%) I&O's Summary    GENERAL:  [x ]Alert  [x ]Oriented x 0-1   [ ]Lethargic  [ ]Cachexia  [ ]Unarousable  [ ]Verbal  [ ]Non-Verbal  Behavioral:   [ ] Anxiety  [ ] Delirium [ ] Agitation [x ] calm  HEENT:  [ ]Normal   [x ]Dry mouth   [ ]ET Tube/Trach  [ ]Oral lesions  PULMONARY:   [x ]Clear  [ ]Tachypnea  [ ]Audible excessive secretions   [ ]Rhonchi        [ ]Right [ ]Left [ ]Bilateral  [ ]Crackles        [ ]Right [ ]Left [ ]Bilateral  [ ]Wheezing     [ ]Right [ ]Left [ ]Bilateral  CARDIOVASCULAR:    [x ]Regular [ ]Irregular [ ]Tachy  [ ]Cristian [ ]Murmur [ ]Other  GASTROINTESTINAL:  [x ]Soft  [ ]Distended   [ ]+BS  [x ]Non tender [ ]Tender  [ ]PEG [ ]OGT/ NGT  Last BM:   GENITOURINARY:  [ ]Normal [ x] Incontinent   [ ]Oliguria/Anuria   [ ]Holly  MUSCULOSKELETAL:   [ ]Normal   [ ]Weakness  [x ]Bed/Wheelchair bound [ ]Edema  NEUROLOGIC:   [ ]No focal deficits  [x ] Cognitive impairment  [ ] Dysphagia [ ]Dysarthria [ ] Paresis [ ]Other   SKIN:   [ x]Normal   [ ]Pressure ulcer(s)  [ ]Rash    LABS:                        12.7   8.52  )-----------( 130      ( 2018 08:00 )             39.6       145  |  114<H>  |  36<H>  ----------------------------<  81  3.6   |  23  |  2.24<H>    Ca    11.2<H>      2018 08:00  Phos  Test not performed SPECIMEN GROSSLY HEMOLYZED       Mg     2.1     11-    TPro  7.8  /  Alb  2.5<L>  /  TBili  1.3<H>  /  DBili  x   /  AST  24  /  ALT  10  /  AlkPhos  82  -  PT/INR - ( 2018 03:30 )   PT: 18.0 SEC;   INR: 1.56          PTT - ( 2018 03:30 )  PTT:30.1 SEC    Urinalysis Basic - ( 2018 23:52 )    Color: YELLOW / Appearance: Lt TURBID / S.020 / pH: 5.5  Gluc: NEGATIVE / Ketone: NEGATIVE  / Bili: NEGATIVE / Urobili: NORMAL   Blood: NEGATIVE / Protein: 70 / Nitrite: NEGATIVE   Leuk Esterase: NEGATIVE / RBC: 11-25 / WBC 6-10   Sq Epi: FEW / Non Sq Epi: x / Bacteria: NEGATIVE    RADIOLOGY & ADDITIONAL STUDIES:  < from: CT Head No Cont (18 @ 00:38) >  EXAM:  CT BRAIN        PROCEDURE DATE:  2018 IMPRESSION: No intracranial hemorrhage or mass effect. Chronic left   parietal lobe infarct.    < end of copied text >    PROTEIN CALORIE MALNUTRITION PRESENT: [ ] Yes [ ] No  [ ] PPSV2 < or = to 30% [ ] significant weight loss  [ ] poor nutritional intake [ ] catabolic state [ ] anasarca     Albumin, Serum: 2.5 g/dL (18 @ 03:30)      REFERRALS:   [ ]Chaplaincy  [ ] Hospice  [ ]Child Life  [ ]Social Work  [ ]Case management [ ]Holistic Therapy   Goals of Care Discussion Document:
Dr. Espinoza  Office [phone]  Cell [phone]  Lyndsey CALL  Cell [phone]    HPI:  89 y/o Male w/ MHx of Alzheimer's (oriented usually to self only), HTN, DM Type 2, CAD s/p bypass c/b A-fib (not on ASA, on home hospice), multiple myeloma c/b multiple osseous lytic lesions with moderate compression Fx of T12 and L3 per CT A/P dated 18 (on hospice DNR/I) with no aggressive measures to be done except medical therapy, presents with fever/lethargy at home. Found to have viral sepsis. Has renal dysfunction on admission.      Allergies:  No Known Allergies      PAST MEDICAL & SURGICAL HISTORY:  Hep C w/o coma, chronic  Tubular adenoma of colon  Gastritis: + h pylori  DM (diabetes mellitus)  GERD (gastroesophageal reflux disease)  HTN (hypertension)  History of subdural hematoma (post traumatic)  S/P CABG x 3  No Past Surgical History      Home Medications Reviewed    Hospital Medications:   MEDICATIONS  (STANDING):  heparin  Injectable 5000 Unit(s) SubCutaneous every 12 hours  lactated ringers. 1000 milliLiter(s) (75 mL/Hr) IV Continuous <Continuous>  piperacillin/tazobactam IVPB. 3.375 Gram(s) IV Intermittent every 12 hours      SOCIAL HISTORY:  Denies ETOh, Smoking,     FAMILY HISTORY:  No pertinent family history in first degree relatives      REVIEW OF SYSTEMS:  Lethargic, unable to provide ROS    VITALS:  T(F): 98.7 (18 @ 12:31), Max: 101.8 (18 @ 01:08)  HR: 90 (18 @ 12:31)  BP: 98/58 (18 @ 12:31)  RR: 18 (18 @ 12:31)  SpO2: 97% (18 @ 12:31)  Wt(kg): --        PHYSICAL EXAM:  Constitutional: NAD  HEENT: anicteric sclera, oropharynx clear, MMM  Neck: No JVD  Respiratory: CTAB, no wheezes, rales or rhonchi  Cardiovascular: S1, S2, RRR  Gastrointestinal: BS+, soft, NT/ND  Extremities: No cyanosis or clubbing. No peripheral edema  Neurological: A/O x 3, no focal deficits  Psychiatric: Normal mood, normal affect  : No CVA tenderness.   Skin: No rashes     LABS:      140  |  114<H>  |  37<H>  ----------------------------<  150<H>  3.5   |  17<L>  |  2.54<H>    Ca    10.9<H>      2018 03:30  Phos  Test not performed SPECIMEN GROSSLY HEMOLYZED       Mg     2.1         TPro  7.8  /  Alb  2.5<L>  /  TBili  1.3<H>  /  DBili      /  AST  24  /  ALT  10  /  AlkPhos  82      Creatinine Trend: 2.54 <--, 2.52 <--                        12.0   7.88  )-----------( 138      ( 2018 03:30 )             36.7     Urine Studies:  Urinalysis Basic - ( 2018 23:52 )    Color: YELLOW / Appearance: Lt TURBID / S.020 / pH: 5.5  Gluc: NEGATIVE / Ketone: NEGATIVE  / Bili: NEGATIVE / Urobili: NORMAL   Blood: NEGATIVE / Protein: 70 / Nitrite: NEGATIVE   Leuk Esterase: NEGATIVE / RBC: 11-25 / WBC 6-10   Sq Epi: FEW / Non Sq Epi:  / Bacteria: NEGATIVE          RADIOLOGY & ADDITIONAL STUDIES:

## 2018-11-26 NOTE — PROGRESS NOTE ADULT - PROBLEM SELECTOR PLAN 2
-will c/w Zosyn IV renally dosed until BCx negative given immunocompromised status and prior (and recent) h/o GN bacteremia: E. coli and K. pneumonia;  -f/u BCx  -

## 2018-11-26 NOTE — DISCHARGE NOTE ADULT - SECONDARY DIAGNOSIS.
Acute on chronic renal insufficiency Essential hypertension Multiple myeloma not having achieved remission Type 2 diabetes mellitus with hyperglycemia, without long-term current use of insulin

## 2018-11-26 NOTE — CONSULT NOTE ADULT - ASSESSMENT
88 year old male with Multiple Myeloma, Dementia, functional quadriplegia and encounter for palliative care.
10 y/o Male w/ MHx of Alzheimer's (oriented usually to self only), HTN, DM Type 2, CAD s/p bypass c/b A-fib (not on ASA, on home hospice), multiple myeloma c/b multiple osseous lytic lesions with moderate compression Fx of T12 and L3 per CT A/P dated 8/23/18 (on hospice DNR/I) with no aggressive measures to be done except medical therapy, presents with fever/lethargy at home. Found to have viral sepsis. Has renal dysfunction on admission.

## 2018-11-26 NOTE — CONSULT NOTE ADULT - PROBLEM SELECTOR RECOMMENDATION 4
PPS 30%. Skin care PRN. Full assist with ADL's.
Has lactic acidosis  Likely sec to sepsis  Improving

## 2018-11-26 NOTE — DISCHARGE NOTE ADULT - CARE PLAN
Principal Discharge DX:	Viral sepsis  Goal:	resolution  Assessment and plan of treatment:	Enterovirus and coronavirus positive, viral upper respiratory infections.  Supportive care of viral infections.  Secondary Diagnosis:	Acute on chronic renal insufficiency  Assessment and plan of treatment:	Nephrology was consulted -  Renal ultrasound-----------  Secondary Diagnosis:	Essential hypertension  Secondary Diagnosis:	Multiple myeloma not having achieved remission  Assessment and plan of treatment:	Continue with supportive care, comfort measures, hospice care.  Secondary Diagnosis:	Type 2 diabetes mellitus with hyperglycemia, without long-term current use of insulin  Assessment and plan of treatment:	Your a1c is------- Principal Discharge DX:	Viral sepsis  Goal:	resolution  Assessment and plan of treatment:	Enterovirus and coronavirus positive, viral upper respiratory infections.  Supportive care of viral infections.  Secondary Diagnosis:	Acute on chronic renal insufficiency  Assessment and plan of treatment:	Nephrology was consulted -  Renal ultrasound-----------  Secondary Diagnosis:	Essential hypertension  Secondary Diagnosis:	Multiple myeloma not having achieved remission  Assessment and plan of treatment:	Continue with supportive care, comfort measures, hospice care.  Secondary Diagnosis:	Type 2 diabetes mellitus with hyperglycemia, without long-term current use of insulin  Assessment and plan of treatment:	Your a1c is 6.9. Continue consistent carbohydrate diet.  Monitor blood glucose levels throughout the day before meals and at bedtime.  Record blood sugars and bring to outpatient providers appointment in order to be reviewed by your doctor for management modifications.  Be aware of diabetes management symptoms including feeling cool and clammy may be related to low glucose levels.  Feeling hot and dry may indicate high glucose levels.  If  you feel these symptoms, check your blood sugar.  Make regular podiatry appointments in order to have feet checked for wounds and toe nails cut by a doctor to prevent infections. Principal Discharge DX:	Viral sepsis  Goal:	resolution  Assessment and plan of treatment:	Enterovirus and coronavirus positive, viral upper respiratory infections.  Supportive care of viral infections.  Secondary Diagnosis:	Acute on chronic renal insufficiency  Assessment and plan of treatment:	Nephrology was consulted -  Renal ultrasound   -renal US---Bilateral cysts and echogenic foci likely representing intrarenal   calculi. No hydronephrosis.  Secondary Diagnosis:	Essential hypertension  Assessment and plan of treatment:	Monitor for any visual changes, headaches or dizziness.  Monitor blood pressure regularly.  Follow up with your PCP for further management for high blood pressure, please call to make appointment within 1 week of discharge  Secondary Diagnosis:	Multiple myeloma not having achieved remission  Assessment and plan of treatment:	Continue with supportive care, comfort measures, hospice care.  Secondary Diagnosis:	Type 2 diabetes mellitus with hyperglycemia, without long-term current use of insulin  Assessment and plan of treatment:	Your a1c is 6.9. Continue consistent carbohydrate diet.  Monitor blood glucose levels throughout the day before meals and at bedtime.  Record blood sugars and bring to outpatient providers appointment in order to be reviewed by your doctor for management modifications.  Be aware of diabetes management symptoms including feeling cool and clammy may be related to low glucose levels.  Feeling hot and dry may indicate high glucose levels.  If  you feel these symptoms, check your blood sugar.  Make regular podiatry appointments in order to have feet checked for wounds and toe nails cut by a doctor to prevent infections.

## 2018-11-26 NOTE — DISCHARGE NOTE ADULT - MEDICATION SUMMARY - MEDICATIONS TO STOP TAKING
I will STOP taking the medications listed below when I get home from the hospital:    ciprofloxacin 500 mg oral tablet  -- 1 tab(s) by mouth once a day

## 2018-11-26 NOTE — CONSULT NOTE ADULT - PROBLEM SELECTOR RECOMMENDATION 5
Family would like to take patient back home with hospice services, after his infection clears. Patient remains a DNR/DNI.

## 2018-11-27 DIAGNOSIS — E87.6 HYPOKALEMIA: ICD-10-CM

## 2018-11-27 LAB
BUN SERPL-MCNC: 20 MG/DL — SIGNIFICANT CHANGE UP (ref 7–23)
BUN SERPL-MCNC: 21 MG/DL — SIGNIFICANT CHANGE UP (ref 7–23)
BUN SERPL-MCNC: 27 MG/DL — HIGH (ref 7–23)
BUN SERPL-MCNC: 28 MG/DL — HIGH (ref 7–23)
CALCIUM SERPL-MCNC: 10 MG/DL — SIGNIFICANT CHANGE UP (ref 8.4–10.5)
CALCIUM SERPL-MCNC: 10.1 MG/DL — SIGNIFICANT CHANGE UP (ref 8.4–10.5)
CALCIUM SERPL-MCNC: 10.7 MG/DL — HIGH (ref 8.4–10.5)
CALCIUM SERPL-MCNC: 10.8 MG/DL — HIGH (ref 8.4–10.5)
CHLORIDE SERPL-SCNC: 117 MMOL/L — HIGH (ref 98–107)
CHLORIDE SERPL-SCNC: 118 MMOL/L — HIGH (ref 98–107)
CHLORIDE SERPL-SCNC: 118 MMOL/L — HIGH (ref 98–107)
CHLORIDE SERPL-SCNC: 119 MMOL/L — HIGH (ref 98–107)
CO2 SERPL-SCNC: 21 MMOL/L — LOW (ref 22–31)
CO2 SERPL-SCNC: 21 MMOL/L — LOW (ref 22–31)
CO2 SERPL-SCNC: 22 MMOL/L — SIGNIFICANT CHANGE UP (ref 22–31)
CO2 SERPL-SCNC: 23 MMOL/L — SIGNIFICANT CHANGE UP (ref 22–31)
CREAT SERPL-MCNC: 1.66 MG/DL — HIGH (ref 0.5–1.3)
CREAT SERPL-MCNC: 1.67 MG/DL — HIGH (ref 0.5–1.3)
CREAT SERPL-MCNC: 1.77 MG/DL — HIGH (ref 0.5–1.3)
CREAT SERPL-MCNC: 1.86 MG/DL — HIGH (ref 0.5–1.3)
GLUCOSE BLDC GLUCOMTR-MCNC: 103 MG/DL — HIGH (ref 70–99)
GLUCOSE BLDC GLUCOMTR-MCNC: 131 MG/DL — HIGH (ref 70–99)
GLUCOSE SERPL-MCNC: 113 MG/DL — HIGH (ref 70–99)
GLUCOSE SERPL-MCNC: 115 MG/DL — HIGH (ref 70–99)
GLUCOSE SERPL-MCNC: 122 MG/DL — HIGH (ref 70–99)
GLUCOSE SERPL-MCNC: 124 MG/DL — HIGH (ref 70–99)
HBA1C BLD-MCNC: 6.9 % — HIGH (ref 4–5.6)
HCT VFR BLD CALC: 38.7 % — LOW (ref 39–50)
HGB BLD-MCNC: 12.6 G/DL — LOW (ref 13–17)
MAGNESIUM SERPL-MCNC: 1.6 MG/DL — SIGNIFICANT CHANGE UP (ref 1.6–2.6)
MAGNESIUM SERPL-MCNC: 1.6 MG/DL — SIGNIFICANT CHANGE UP (ref 1.6–2.6)
MAGNESIUM SERPL-MCNC: 1.7 MG/DL — SIGNIFICANT CHANGE UP (ref 1.6–2.6)
MCHC RBC-ENTMCNC: 27.6 PG — SIGNIFICANT CHANGE UP (ref 27–34)
MCHC RBC-ENTMCNC: 32.6 % — SIGNIFICANT CHANGE UP (ref 32–36)
MCV RBC AUTO: 84.7 FL — SIGNIFICANT CHANGE UP (ref 80–100)
NRBC # FLD: 0.02 — SIGNIFICANT CHANGE UP
PHOSPHATE SERPL-MCNC: 1.8 MG/DL — LOW (ref 2.5–4.5)
PHOSPHATE SERPL-MCNC: 2.3 MG/DL — LOW (ref 2.5–4.5)
PHOSPHATE SERPL-MCNC: 2.9 MG/DL — SIGNIFICANT CHANGE UP (ref 2.5–4.5)
PLATELET # BLD AUTO: 157 K/UL — SIGNIFICANT CHANGE UP (ref 150–400)
PMV BLD: 9.4 FL — SIGNIFICANT CHANGE UP (ref 7–13)
POTASSIUM SERPL-MCNC: 2.9 MMOL/L — CRITICAL LOW (ref 3.5–5.3)
POTASSIUM SERPL-MCNC: 3 MMOL/L — LOW (ref 3.5–5.3)
POTASSIUM SERPL-MCNC: 3.4 MMOL/L — LOW (ref 3.5–5.3)
POTASSIUM SERPL-MCNC: 3.9 MMOL/L — SIGNIFICANT CHANGE UP (ref 3.5–5.3)
POTASSIUM SERPL-SCNC: 2.9 MMOL/L — CRITICAL LOW (ref 3.5–5.3)
POTASSIUM SERPL-SCNC: 3 MMOL/L — LOW (ref 3.5–5.3)
POTASSIUM SERPL-SCNC: 3.4 MMOL/L — LOW (ref 3.5–5.3)
POTASSIUM SERPL-SCNC: 3.9 MMOL/L — SIGNIFICANT CHANGE UP (ref 3.5–5.3)
RBC # BLD: 4.57 M/UL — SIGNIFICANT CHANGE UP (ref 4.2–5.8)
RBC # FLD: 15.7 % — HIGH (ref 10.3–14.5)
SODIUM SERPL-SCNC: 148 MMOL/L — HIGH (ref 135–145)
SODIUM SERPL-SCNC: 148 MMOL/L — HIGH (ref 135–145)
SODIUM SERPL-SCNC: 149 MMOL/L — HIGH (ref 135–145)
SODIUM SERPL-SCNC: 149 MMOL/L — HIGH (ref 135–145)
WBC # BLD: 9.43 K/UL — SIGNIFICANT CHANGE UP (ref 3.8–10.5)
WBC # FLD AUTO: 9.43 K/UL — SIGNIFICANT CHANGE UP (ref 3.8–10.5)

## 2018-11-27 RX ORDER — POTASSIUM PHOSPHATE, MONOBASIC POTASSIUM PHOSPHATE, DIBASIC 236; 224 MG/ML; MG/ML
15 INJECTION, SOLUTION INTRAVENOUS ONCE
Qty: 0 | Refills: 0 | Status: COMPLETED | OUTPATIENT
Start: 2018-11-27 | End: 2018-11-27

## 2018-11-27 RX ORDER — SODIUM CHLORIDE 9 MG/ML
1000 INJECTION, SOLUTION INTRAVENOUS
Qty: 0 | Refills: 0 | Status: DISCONTINUED | OUTPATIENT
Start: 2018-11-27 | End: 2018-11-28

## 2018-11-27 RX ORDER — POTASSIUM CHLORIDE 20 MEQ
40 PACKET (EA) ORAL EVERY 4 HOURS
Qty: 0 | Refills: 0 | Status: COMPLETED | OUTPATIENT
Start: 2018-11-27 | End: 2018-11-27

## 2018-11-27 RX ADMIN — Medication 40 MILLIEQUIVALENT(S): at 21:29

## 2018-11-27 RX ADMIN — SODIUM CHLORIDE 75 MILLILITER(S): 9 INJECTION, SOLUTION INTRAVENOUS at 07:32

## 2018-11-27 RX ADMIN — PIPERACILLIN AND TAZOBACTAM 25 GRAM(S): 4; .5 INJECTION, POWDER, LYOPHILIZED, FOR SOLUTION INTRAVENOUS at 21:29

## 2018-11-27 RX ADMIN — Medication 40 MILLIEQUIVALENT(S): at 18:21

## 2018-11-27 RX ADMIN — HEPARIN SODIUM 5000 UNIT(S): 5000 INJECTION INTRAVENOUS; SUBCUTANEOUS at 07:32

## 2018-11-27 RX ADMIN — HEPARIN SODIUM 5000 UNIT(S): 5000 INJECTION INTRAVENOUS; SUBCUTANEOUS at 18:22

## 2018-11-27 RX ADMIN — POTASSIUM PHOSPHATE, MONOBASIC POTASSIUM PHOSPHATE, DIBASIC 62.5 MILLIMOLE(S): 236; 224 INJECTION, SOLUTION INTRAVENOUS at 11:52

## 2018-11-27 RX ADMIN — SODIUM CHLORIDE 75 MILLILITER(S): 9 INJECTION, SOLUTION INTRAVENOUS at 11:51

## 2018-11-27 RX ADMIN — PIPERACILLIN AND TAZOBACTAM 25 GRAM(S): 4; .5 INJECTION, POWDER, LYOPHILIZED, FOR SOLUTION INTRAVENOUS at 10:42

## 2018-11-27 RX ADMIN — Medication 40 MILLIEQUIVALENT(S): at 14:55

## 2018-11-27 NOTE — PROGRESS NOTE ADULT - PROBLEM SELECTOR PLAN 1
Both Entero/Rhino and Corona viruses (+) on RVP; c/b encephalopathy; c/b septic encephalopathy;    -avoid sedatives / opioids for now given encephalopathy   -Supportive care  -Tylenol and Asa for pyrexia   -IV hydration

## 2018-11-27 NOTE — PROGRESS NOTE ADULT - PROBLEM SELECTOR PLAN 1
Baseline Scr 1.2-1.3  ERIKA likely sec to sepsis  Renal function improving with IVF  Change IVF to LR at 75cc/hr   Renal US is negative for obstruction  Monitor I/O  Avoid nephrotoxic meds.

## 2018-11-27 NOTE — PROVIDER CONTACT NOTE (OTHER) - ACTION/TREATMENT ORDERED:
K- 2.9. Thuy Layne, VIKTORIYA, ADS made aware.  Repeat blood work @ 17 pm. Will continue to monitor.

## 2018-11-28 VITALS
SYSTOLIC BLOOD PRESSURE: 140 MMHG | HEART RATE: 90 BPM | DIASTOLIC BLOOD PRESSURE: 80 MMHG | TEMPERATURE: 98 F | RESPIRATION RATE: 18 BRPM | OXYGEN SATURATION: 100 %

## 2018-11-28 DIAGNOSIS — E83.39 OTHER DISORDERS OF PHOSPHORUS METABOLISM: ICD-10-CM

## 2018-11-28 LAB
BACTERIA UR CULT: SIGNIFICANT CHANGE UP
BUN SERPL-MCNC: 17 MG/DL — SIGNIFICANT CHANGE UP (ref 7–23)
CALCIUM SERPL-MCNC: 10.5 MG/DL — SIGNIFICANT CHANGE UP (ref 8.4–10.5)
CHLORIDE SERPL-SCNC: 120 MMOL/L — HIGH (ref 98–107)
CO2 SERPL-SCNC: 19 MMOL/L — LOW (ref 22–31)
CREAT SERPL-MCNC: 1.63 MG/DL — HIGH (ref 0.5–1.3)
GLUCOSE SERPL-MCNC: 85 MG/DL — SIGNIFICANT CHANGE UP (ref 70–99)
MAGNESIUM SERPL-MCNC: 1.6 MG/DL — SIGNIFICANT CHANGE UP (ref 1.6–2.6)
PHOSPHATE SERPL-MCNC: 1.8 MG/DL — LOW (ref 2.5–4.5)
POTASSIUM SERPL-MCNC: 4.3 MMOL/L — SIGNIFICANT CHANGE UP (ref 3.5–5.3)
POTASSIUM SERPL-SCNC: 4.3 MMOL/L — SIGNIFICANT CHANGE UP (ref 3.5–5.3)
SODIUM SERPL-SCNC: 148 MMOL/L — HIGH (ref 135–145)
SPECIMEN SOURCE: SIGNIFICANT CHANGE UP

## 2018-11-28 RX ORDER — SODIUM,POTASSIUM PHOSPHATES 278-250MG
1 POWDER IN PACKET (EA) ORAL
Qty: 0 | Refills: 0 | Status: DISCONTINUED | OUTPATIENT
Start: 2018-11-28 | End: 2018-11-28

## 2018-11-28 RX ORDER — SODIUM CHLORIDE 9 MG/ML
1000 INJECTION, SOLUTION INTRAVENOUS
Qty: 0 | Refills: 0 | Status: DISCONTINUED | OUTPATIENT
Start: 2018-11-28 | End: 2018-11-28

## 2018-11-28 RX ORDER — SODIUM,POTASSIUM PHOSPHATES 278-250MG
1 POWDER IN PACKET (EA) ORAL
Qty: 8 | Refills: 0 | OUTPATIENT
Start: 2018-11-28 | End: 2018-11-29

## 2018-11-28 RX ADMIN — SODIUM CHLORIDE 75 MILLILITER(S): 9 INJECTION, SOLUTION INTRAVENOUS at 12:08

## 2018-11-28 RX ADMIN — Medication 1 TABLET(S): at 12:07

## 2018-11-28 RX ADMIN — HEPARIN SODIUM 5000 UNIT(S): 5000 INJECTION INTRAVENOUS; SUBCUTANEOUS at 06:11

## 2018-11-28 NOTE — PROGRESS NOTE ADULT - PROBLEM SELECTOR PLAN 1
Baseline Scr 1.2-1.3  ERIKA likely sec to sepsis  Renal function improving with IVF  Change IVF to  D5 with 75meQ of bicarb at 75cc/hr in view of hypernatremia and acidosis  Renal US is negative for obstruction  Monitor I/O  Avoid nephrotoxic meds.

## 2018-11-28 NOTE — CHART NOTE - NSCHARTNOTEFT_GEN_A_CORE
Spoke with Dr. Leyva, patient is medically cleared for discharge home today with home hospice. GARY Solorio made aware

## 2018-11-28 NOTE — PROGRESS NOTE ADULT - ASSESSMENT
10 y/o Male w/ MHx of Alzheimer's (oriented usually to self only), HTN, DM Type 2, CAD s/p bypass c/b A-fib (not on ASA, on home hospice), multiple myeloma c/b multiple osseous lytic lesions with moderate compression Fx of T12 and L3 per CT A/P dated 8/23/18 (on hospice DNR/I) with no aggressive measures to be done except medical therapy, presents with fever/lethargy at home. Found to have viral sepsis. Has renal dysfunction on admission.

## 2018-11-29 LAB — BACTERIA BLD CULT: SIGNIFICANT CHANGE UP

## 2018-11-30 LAB — BACTERIA BLD CULT: SIGNIFICANT CHANGE UP

## 2020-12-31 PROBLEM — G30.9 ALZHEIMER'S DEMENTIA: Status: ACTIVE | Noted: 2018-07-06

## 2022-03-17 NOTE — ED PROVIDER NOTE - CONSTITUTIONAL MENTATION
Discussed the risks/benefits of laser capsulotomy. Observation recommended.
awake
dietitian/nutrition services

## 2022-07-16 NOTE — CONSULT NOTE ADULT - PROBLEM/RECOMMENDATION-2
DISPLAY PLAN FREE TEXT
DISPLAY PLAN FREE TEXT
[id number]:1-3 Days|| ||00\01||False;
DISPLAY PLAN FREE TEXT

## 2022-08-31 NOTE — ED PROVIDER NOTE - CARDIAC PEDAL EDEMA
Screening Questions for Patients Receiving Biologic Agents  (includes but is not limited to Remicade, IVIG, Tysabri, Ocrevus, Entyvio, Benlysta, Simponi, Orencia)    1. Any problems with previous infusions?  No    2. Do you currently have any of the following symptoms that are new to you?   Headache:  No   Nausea:  No   Fever:  No   Cough:  No   SOB:  No   Upper Respiratory Symptoms:  No   Chest Pain:  No   Changes in BP:  No   Rash/Hives:  No   Dizziness:  No                          Have you recently been treated for any type of infection (sinus, UTI, COVID-19): No                       absent

## 2023-03-29 NOTE — DISCHARGE NOTE ADULT - PRINCIPAL DIAGNOSIS
Right endoscopic carpal tunnel decompression Viral sepsis Right endoscopic carpal tunnel decompression and Right index trigger finger injection

## 2023-03-30 NOTE — ED ADULT NURSE NOTE - BP NONINVASIVE DIASTOLIC (MM HG)
77 Manual Repair Warning Statement: We plan on removing the manually selected variable below in favor of our much easier automatic structured text blocks found in the previous tab. We decided to do this to help make the flow better and give you the full power of structured data. Manual selection is never going to be ideal in our platform and I would encourage you to avoid using manual selection from this point on, especially since I will be sunsetting this feature. It is important that you do one of two things with the customized text below. First, you can save all of the text in a word file so you can have it for future reference. Second, transfer the text to the appropriate area in the Library tab. Lastly, if there is a flap or graft type which we do not have you need to let us know right away so I can add it in before the variable is hidden. No need to panic, we plan to give you roughly 6 months to make the change.

## 2024-08-06 NOTE — PHYSICAL THERAPY INITIAL EVALUATION ADULT - PATIENT/FAMILY AGREES WITH PLAN
Detail Level: Simple
Render Risk Assessment In Note?: no
Comment: Encouraged him to have f/u with oncology (he has been putting off surveillance appt )
yes